# Patient Record
Sex: MALE | Race: BLACK OR AFRICAN AMERICAN | NOT HISPANIC OR LATINO | Employment: OTHER | URBAN - NONMETROPOLITAN AREA
[De-identification: names, ages, dates, MRNs, and addresses within clinical notes are randomized per-mention and may not be internally consistent; named-entity substitution may affect disease eponyms.]

---

## 2017-01-08 ENCOUNTER — TRANSCRIBE ORDERS (OUTPATIENT)
Dept: CARDIAC SURGERY | Facility: CLINIC | Age: 67
End: 2017-01-08

## 2017-01-08 DIAGNOSIS — I73.9 PVD (PERIPHERAL VASCULAR DISEASE) (HCC): Primary | ICD-10-CM

## 2017-01-12 ENCOUNTER — OFFICE VISIT (OUTPATIENT)
Dept: FAMILY MEDICINE CLINIC | Facility: CLINIC | Age: 67
End: 2017-01-12

## 2017-01-12 VITALS
BODY MASS INDEX: 19.05 KG/M2 | OXYGEN SATURATION: 96 % | HEART RATE: 75 BPM | SYSTOLIC BLOOD PRESSURE: 130 MMHG | DIASTOLIC BLOOD PRESSURE: 79 MMHG | WEIGHT: 140.6 LBS | HEIGHT: 72 IN

## 2017-01-12 DIAGNOSIS — Z13.9 SCREENING: ICD-10-CM

## 2017-01-12 DIAGNOSIS — B35.1 ONYCHOMYCOSIS: ICD-10-CM

## 2017-01-12 DIAGNOSIS — I73.9 PERIPHERAL VASCULAR DISEASE (HCC): ICD-10-CM

## 2017-01-12 DIAGNOSIS — I10 ESSENTIAL HYPERTENSION: Primary | ICD-10-CM

## 2017-01-12 LAB
ALBUMIN SERPL-MCNC: 4.1 GM/DL (ref 3.4–4.8)
ALP SERPL-CCNC: 92 U/L (ref 38–126)
ALT SERPL-CCNC: 30 U/L (ref 21–72)
ANION GAP SERPL CALCULATED.3IONS-SCNC: 10 MMOL/L (ref 5–15)
AST SERPL-CCNC: 54 U/L (ref 17–59)
BILIRUB SERPL-MCNC: 0.5 MG/DL (ref 0.2–1.3)
BUN SERPL-MCNC: 14 MG/DL (ref 7–21)
CALCIUM SERPL-MCNC: 9.1 MG/DL (ref 8.4–10.2)
CHLORIDE SERPL-SCNC: 103 MMOL/L (ref 95–110)
CO2 SERPL-SCNC: 25 MMOL/L (ref 22–31)
CREAT SERPL-MCNC: 1.3 MG/DL (ref 0.7–1.3)
GLUCOSE SERPL-MCNC: 81 MG/DL (ref 60–100)
POTASSIUM SERPL-SCNC: 4.1 MMOL/L (ref 3.5–5.1)
PROT SERPL-MCNC: 7.7 GM/DL (ref 6.3–8.6)
SODIUM SERPL-SCNC: 138 MMOL/L (ref 137–145)

## 2017-01-12 PROCEDURE — 99213 OFFICE O/P EST LOW 20 MIN: CPT | Performed by: FAMILY MEDICINE

## 2017-01-12 RX ORDER — ATORVASTATIN CALCIUM 80 MG/1
80 TABLET, FILM COATED ORAL DAILY
Qty: 30 TABLET | Refills: 3 | Status: SHIPPED | OUTPATIENT
Start: 2017-01-12 | End: 2017-06-06 | Stop reason: SDUPTHER

## 2017-01-12 RX ORDER — HYDRALAZINE HYDROCHLORIDE 25 MG/1
25 TABLET, FILM COATED ORAL 3 TIMES DAILY
Qty: 90 TABLET | Refills: 3 | Status: SHIPPED | OUTPATIENT
Start: 2017-01-12 | End: 2017-06-06 | Stop reason: SDUPTHER

## 2017-01-12 RX ORDER — CLOPIDOGREL BISULFATE 75 MG/1
75 TABLET ORAL DAILY
Qty: 30 TABLET | Refills: 3 | Status: SHIPPED | OUTPATIENT
Start: 2017-01-12 | End: 2017-06-06 | Stop reason: SDUPTHER

## 2017-01-12 RX ORDER — TERBINAFINE HYDROCHLORIDE 250 MG/1
250 TABLET ORAL DAILY
Qty: 84 TABLET | Refills: 0 | Status: SHIPPED | OUTPATIENT
Start: 2017-01-12 | End: 2017-04-12

## 2017-01-12 NOTE — MR AVS SNAPSHOT
Dwight Ryder   1/12/2017 9:15 AM   Office Visit    Dept Phone:  [phone]   Encounter #:  [id number]    Provider:  Ganga Acuña MD   Department:  [facility] FAMILY MEDICINE                Your Full Care Plan              Your Updated Medication List          This list is accurate as of: 1/12/17  9:57 AM.  Always use your most recent med list.                aspirin 81 MG tablet   Take 1 tablet by mouth Daily.       atorvastatin 80 MG tablet   Commonly known as:  LIPITOR   Take 1 tablet by mouth Daily.       clopidogrel 75 MG tablet   Commonly known as:  PLAVIX   Take 1 tablet by mouth Daily.       diltiaZEM  MG 24 hr capsule   Commonly known as:  CARDIZEM CD   take 1 capsule by mouth once daily       hydrALAZINE 25 MG tablet   Commonly known as:  APRESOLINE   Take 1 tablet by mouth 3 (Three) Times a Day.               We Performed the Following     CBC Auto Differential     Comprehensive Metabolic Panel     Hepatitis C Antibody       You Were Diagnosed With        Codes Comments    Screening    -  Primary ICD-10-CM: Z13.9  ICD-9-CM: V82.9     Essential hypertension     ICD-10-CM: I10  ICD-9-CM: 401.9       Instructions     None    Patient Instructions History      Upcoming Appointments     Visit Type Date Time Department    OFFICE VISIT 1/12/2017  9:15 AM MGW  RESIDENT [facility]    FOLLOW UP 5/22/2017  9:40 AM MGW CT VAS SURGERY [facility] ART GRAFT UNILAT 5/22/2017  8:30 AM MGW CT VAS SURGERY [facility] RANGEL 5/22/2017  9:00 AM MGW CT VAS SURGERY [facility]      MyChart Signup     Our records indicate that you have declined [facility] signup. If you would like to sign up for [facility], please email [email or web address] or call [phone] to obtain an activation code.             Other Info from Your Visit           Your Appointments     May 22, 2017  8:30 AM CDT   VASCULAR ULTRASOUND VISIT with [facility] RANGEL ROOM   [facility]  "CARDIOTHORACIC AND VASCULAR SURGERY (--)    [address] [place] [address] [address]  [facility] [address] [place] [id number] [phone]            May 22, 2017  9:00 AM CDT   VASCULAR ULTRASOUND VISIT with RUI [facility] RANGEL ROOM   [facility] CARDIOTHORACIC AND VASCULAR SURGERY (--)    [address] [place] [address] Dr  [facility] [address] [place] [id number] [phone]            May 22, 2017  9:40 AM CDT   Follow Up with Kavon Robison MD   [facility] CARDIOTHORACIC AND VASCULAR SURGERY (--)    [address] [place] [address] [address]  [facility] [address] [place] [id number] [phone]           Arrive 15 minutes prior to appointment.              Other Notes About Your Plan     Risk score 4.        Allergies     Lisinopril        Reason for Visit     Follow-up     Hypertension           Vital Signs     Blood Pressure Pulse Height Weight Oxygen Saturation Body Mass Index    130/79 (BP Location: Left arm, Patient Position: Sitting) 75 72\" (182.9 cm) 140 lb 9.6 oz (63.8 kg) 96% 19.07 kg/m2    Smoking Status                   Current Every Day Smoker           Problems and Diagnoses Noted     High blood pressure    Screening    -  Primary        "

## 2017-01-13 LAB — HCV AB SERPL QL IA: NEGATIVE

## 2017-04-12 ENCOUNTER — APPOINTMENT (OUTPATIENT)
Dept: LAB | Facility: HOSPITAL | Age: 67
End: 2017-04-12

## 2017-04-12 ENCOUNTER — OFFICE VISIT (OUTPATIENT)
Dept: FAMILY MEDICINE CLINIC | Facility: CLINIC | Age: 67
End: 2017-04-12

## 2017-04-12 VITALS
HEIGHT: 72 IN | BODY MASS INDEX: 18.83 KG/M2 | OXYGEN SATURATION: 98 % | WEIGHT: 139 LBS | DIASTOLIC BLOOD PRESSURE: 70 MMHG | HEART RATE: 79 BPM | SYSTOLIC BLOOD PRESSURE: 130 MMHG

## 2017-04-12 DIAGNOSIS — F17.218 NICOTINE DEPENDENCE, CIGARETTES, WITH OTHER NICOTINE-INDUCED DISORDERS: ICD-10-CM

## 2017-04-12 DIAGNOSIS — E78.2 MIXED HYPERLIPIDEMIA: ICD-10-CM

## 2017-04-12 DIAGNOSIS — I10 ESSENTIAL HYPERTENSION: Primary | ICD-10-CM

## 2017-04-12 DIAGNOSIS — B35.1 ONYCHOMYCOSIS: ICD-10-CM

## 2017-04-12 DIAGNOSIS — I70.211 ATHEROSCLEROSIS OF NATIVE ARTERY OF RIGHT LOWER EXTREMITY WITH INTERMITTENT CLAUDICATION (HCC): ICD-10-CM

## 2017-04-12 DIAGNOSIS — Z51.81 MEDICATION MONITORING ENCOUNTER: ICD-10-CM

## 2017-04-12 PROCEDURE — 99213 OFFICE O/P EST LOW 20 MIN: CPT | Performed by: FAMILY MEDICINE

## 2017-04-12 PROCEDURE — 99406 BEHAV CHNG SMOKING 3-10 MIN: CPT | Performed by: FAMILY MEDICINE

## 2017-04-12 PROCEDURE — 36415 COLL VENOUS BLD VENIPUNCTURE: CPT | Performed by: FAMILY MEDICINE

## 2017-04-12 NOTE — PROGRESS NOTES
Subjective:     CC:  Toenail fungus (follow-up)    Dwight Ryder is a 66 y.o. male who presents for 3 month follow-up of onychomycosis and HTN.  He completed the course of Lamisil, thinks his toenails look about the same  His blood pressure is well controlled, compliant with medications.    New concerns: None.    Evaluation:   Blood pressure reading not indicated for medication reasons: No   Blood pressure reading refused by patient: No   Home blood pressure readings:  Normotensive, systolic 130s, diastolic 80s.  He checks about once a week presently.   Blood pressure often elevated in physician office: Sometimes   Onset of symptoms: N/A .     Symptoms:   Headache: no   Visual disturbance: yes, chronic, has seen Dr Ray in past for senile cataracts   Fatigue: no   Dyspnea: no   Orthopnea: no   Edema: no   Chest pain: no   Palpitations: no   Diaphoresis: no    Risk Factors:   Tobacco use, HLD, FHx: CAD, FHx: HTN    Comorbid Conditions:   PVD    The following portions of the patient's history were reviewed and updated as appropriate: allergies, current medications, past family history, past medical history, past social history, past surgical history and problem list.    Preventative:  Over the past 2 weeks, have you felt down, depressed, or hopeless?No   Over the past 2 weeks, have you felt little interest or pleasure in doing things?No  Clinical depression screening refused by patient.No     On osteoporosis therapy?No     Past Medical Hx:  Past Medical History:   Diagnosis Date   • Artificial lens present     right   • Astigmatism     myopic   • Atherosclerosis of native arteries of extremity with intermittent claudication    • Benign essential hypertension    • Benign prostatic hyperplasia    • Cataract     R>L   • Corneal foreign body     Left eye, 2 years ago      • Essential hypertension    • Examination     individual health   • Exanthematous disorder    • Fracture of foot    • Hypercholesterolemia     • Hyperkalemia    • Hyperlipidemia    • Hypertensive disorder    • Male erectile disorder    • Need for vaccination    • Peripheral vascular disease     Post RIGHT fem-pop bypass graft 1/7/13 Post thrombolysis to graft 12/15/14      • Peripheral vascular disease     unspecified   • Posterior subcapsular polar senile cataract    • Surgical follow-up care     R fem->pop bypass 1/7/13      • Tobacco dependence syndrome    • Tobacco user    • Urticaria     will give kenalog and benadyl , will order allery test      • Visual discomfort        Past Surgical Hx:  Past Surgical History:   Procedure Laterality Date   • ANGIOPLASTY      femoral-popliteal artery (dilation) (Abdominal aortogram, bilateral lower extremity runoff. Repair of left common femoral artery.)   11/26/2012    • FEMORAL POPLITEAL BYPASS      Right with 6 mm [place]-baron graft)   01/07/2013    • INJECTION OF MEDICATION  03/04/2013    kenalog(3)    • OTHER SURGICAL HISTORY      Remove cataract, insert lens (Cataract extraction with intraocular lens implantation, right eye.)   11/26/2013        Health Maintenance:  Health Maintenance   Topic Date Due   • LIPID PANEL  09/13/2017   • COLONOSCOPY  09/14/2026   • TDAP/TD VACCINES (2 - Td) 09/14/2026   • PNEUMOCOCCAL VACCINE (19-64 MEDIUM RISK)  Completed   • HEPATITIS C SCREENING  Completed   • INFLUENZA VACCINE  Addressed   • ZOSTER VACCINE  Addressed       Current Meds:    Current Outpatient Prescriptions:   •  aspirin 81 MG tablet, Take 1 tablet by mouth Daily., Disp: 30 tablet, Rfl: 3  •  atorvastatin (LIPITOR) 80 MG tablet, Take 1 tablet by mouth Daily., Disp: 30 tablet, Rfl: 3  •  clopidogrel (PLAVIX) 75 MG tablet, Take 1 tablet by mouth Daily., Disp: 30 tablet, Rfl: 3  •  diltiazem CD (CARDIZEM CD) 240 MG 24 hr capsule, take 1 capsule by mouth once daily, Disp: 30 capsule, Rfl: 11  •  hydrALAZINE (APRESOLINE) 25 MG tablet, Take 1 tablet by mouth 3 (Three) Times a Day., Disp: 90 tablet, Rfl:  "3    Allergies:  Lisinopril    Family Hx:  Family History   Problem Relation Age of Onset   • Cancer Other    • Heart disease Other    • Hypertension Other    • Stroke Other         Social History:  Social History     Social History   • Marital status:      Spouse name: N/A   • Number of children: N/A   • Years of education: N/A     Occupational History   • Not on file.     Social History Main Topics   • Smoking status: Current Every Day Smoker     Packs/day: 1.00     Types: Cigarettes   • Smokeless tobacco: Not on file      Comment: 1-1.5 PPD FOR 45 YEARS   • Alcohol use Yes      Comment: 36 to 48 oz a day   • Drug use: No   • Sexual activity: Defer     Other Topics Concern   • Not on file     Social History Narrative   • No narrative on file       Review of Systems  Review of Systems   Constitutional: Negative for activity change and appetite change.   HENT: Negative for congestion and dental problem.    Eyes: Negative for discharge and itching.   Respiratory: Negative for apnea.    Cardiovascular: Negative for chest pain and leg swelling.   Gastrointestinal: Negative for abdominal distention and abdominal pain.   Endocrine: Negative for cold intolerance.   Genitourinary: Negative for difficulty urinating and enuresis.   Musculoskeletal: Negative for arthralgias and back pain.   Allergic/Immunologic: Negative for environmental allergies and food allergies.   Neurological: Negative for dizziness and facial asymmetry.   Hematological: Negative for adenopathy.   Psychiatric/Behavioral: Negative for agitation and behavioral problems.       Objective:     /70  Pulse 79  Ht 72\" (182.9 cm)  Wt 139 lb (63 kg)  SpO2 98%  BMI 18.85 kg/m2    General:  alert, appears stated age and cooperative   Oropharynx: lips, mucosa, and tongue normal; teeth and gums normal    Eyes:  conjunctivae/corneas clear. PERRL, EOM's intact. Fundi benign.    Ears:  normal TM's and external ear canals both ears   Neck: no " adenopathy, no carotid bruit, no JVD, supple, symmetrical, trachea midline and thyroid not enlarged, symmetric, no tenderness/mass/nodules   Thyroid:  no palpable nodule   Lung: clear to auscultation bilaterally   Heart:  regular rate and rhythm, S1, S2 normal, no murmur, click, rub or gallop   Abdomen: soft, non-tender; bowel sounds normal; no masses,  no organomegaly   Extremities: extremities normal, atraumatic, no cyanosis or edema   Skin:  Onchymycosis both big toes, improved      Pulses: 2+ and symmetric   Neuro: normal without focal findings, mental status, speech normal, alert and oriented x3 and reflexes normal and symmetric       Lab Review   Assessment:     Hypertension well controlled and needs to quit smoking.  1. Essential hypertension    2. Mixed hyperlipidemia    3. Atherosclerosis of native artery of right lower extremity with intermittent claudication    4. Nicotine dependence, cigarettes, with other nicotine-induced disorders    5. Onychomycosis    6. Medication monitoring encounter         Plan:   HTN  1.  Rx changes: none. He states compliant.  2.  Education:    EKG ordered: no    Findings: N/A   Presented an overview of HTN, expected course, considerations, risk factors, and exacerbation prevention.   Discussed treatment options for HTN: yes   Recommended restricted dietary Na intake: yes   Recommended increased in dietary K intake: yes   Discussed patient action plan for HTN: yes  3.  Compliance at present is estimated to be fair. Efforts to improve compliance (if necessary) will be directed at dietary modifications: DASH diet (counseled) and increased exercise.  Recommended more frequent bp monitoring, such as every day or every other day at least.  4.  Follow up: 3 months    PVD, status post R fem-pop 2013--Denies claudication, peripheral pulses normal  -last saw Dr Robison 11/22/16, who recommended f/u in 6 months, RANGEL at that time  -Extensive smoking cessation counseling (3-10 min).  Pt  aware of risks of continued smoking, including MI, CVA, worsened PVD, cancer.    Onychomycosis  -Completed Lamisil  -Recheck CMP today for LFT's    GOALS:  Control bp  BARRIERS TO GOALS:  Insight    Preventative:  Male Preventative: Colon cancer screening is not up to date.    delayed   Recommended:Td, Varicella and Influenza, refused. Also refused colonoscopy/FIT.  .  Smoking cessation counseling was provided.   Pre-comtemplative, declined medications, patches, gum.  Previously declined low-dose CT.  does not drink  decrease soda or juice intake, increase water intake, increase physical activity, reduce portion size, cut out extra servings, reduce fast food intake and have 3 meals a day    RISK SCORE: 4           This document has been electronically signed by Ganga Acuña MD on April 12, 2017 7:46 PM      EMR Dragon/Transcription disclaimer:   Some of this note may be an electronic transcription/translation of spoken language to printed text. The electronic translation of spoken language may permit erroneous, or at times, nonsensical words or phrases to be inadvertently transcribed; Although I have reviewed the note for such errors, some may still exist.

## 2017-04-14 NOTE — PROGRESS NOTES
I have reviewed the notes, assessments, and/or procedures performed by Ganga Acuña MD , I concur with her/his documentation of Dwight Ryder.         This document has been electronically signed by Ana Hilliard MD on April 14, 2017 2:49 PM

## 2017-05-22 ENCOUNTER — OFFICE VISIT (OUTPATIENT)
Dept: CARDIAC SURGERY | Facility: CLINIC | Age: 67
End: 2017-05-22

## 2017-05-22 VITALS
OXYGEN SATURATION: 98 % | DIASTOLIC BLOOD PRESSURE: 72 MMHG | SYSTOLIC BLOOD PRESSURE: 140 MMHG | TEMPERATURE: 95.4 F | HEIGHT: 72 IN | BODY MASS INDEX: 18.56 KG/M2 | HEART RATE: 74 BPM | WEIGHT: 137 LBS

## 2017-05-22 DIAGNOSIS — F17.218 NICOTINE DEPENDENCE, CIGARETTES, WITH OTHER NICOTINE-INDUCED DISORDERS: ICD-10-CM

## 2017-05-22 DIAGNOSIS — N18.2 CHRONIC KIDNEY DISEASE, STAGE 2 (MILD): ICD-10-CM

## 2017-05-22 DIAGNOSIS — I10 ESSENTIAL HYPERTENSION: ICD-10-CM

## 2017-05-22 DIAGNOSIS — E78.2 MIXED HYPERLIPIDEMIA: ICD-10-CM

## 2017-05-22 DIAGNOSIS — I70.211 ATHEROSCLEROSIS OF NATIVE ARTERY OF RIGHT LOWER EXTREMITY WITH INTERMITTENT CLAUDICATION (HCC): Primary | ICD-10-CM

## 2017-05-22 PROBLEM — N18.9 CHRONIC KIDNEY DISEASE: Status: ACTIVE | Noted: 2017-05-22

## 2017-05-22 PROCEDURE — 99406 BEHAV CHNG SMOKING 3-10 MIN: CPT | Performed by: THORACIC SURGERY (CARDIOTHORACIC VASCULAR SURGERY)

## 2017-05-22 PROCEDURE — 99214 OFFICE O/P EST MOD 30 MIN: CPT | Performed by: THORACIC SURGERY (CARDIOTHORACIC VASCULAR SURGERY)

## 2017-06-06 DIAGNOSIS — I73.9 PERIPHERAL VASCULAR DISEASE (HCC): ICD-10-CM

## 2017-06-06 DIAGNOSIS — I10 ESSENTIAL HYPERTENSION: ICD-10-CM

## 2017-06-06 RX ORDER — CLOPIDOGREL BISULFATE 75 MG/1
TABLET ORAL
Qty: 30 TABLET | Refills: 3 | Status: SHIPPED | OUTPATIENT
Start: 2017-06-06 | End: 2017-09-27 | Stop reason: SDUPTHER

## 2017-06-06 RX ORDER — HYDRALAZINE HYDROCHLORIDE 25 MG/1
TABLET, FILM COATED ORAL
Qty: 90 TABLET | Refills: 3 | Status: SHIPPED | OUTPATIENT
Start: 2017-06-06 | End: 2017-09-27 | Stop reason: SDUPTHER

## 2017-06-06 RX ORDER — ATORVASTATIN CALCIUM 80 MG/1
TABLET, FILM COATED ORAL
Qty: 30 TABLET | Refills: 3 | Status: SHIPPED | OUTPATIENT
Start: 2017-06-06 | End: 2017-09-27 | Stop reason: SDUPTHER

## 2017-07-11 ENCOUNTER — APPOINTMENT (OUTPATIENT)
Dept: LAB | Facility: HOSPITAL | Age: 67
End: 2017-07-11

## 2017-07-11 ENCOUNTER — OFFICE VISIT (OUTPATIENT)
Dept: FAMILY MEDICINE CLINIC | Facility: CLINIC | Age: 67
End: 2017-07-11

## 2017-07-11 VITALS
HEIGHT: 72 IN | OXYGEN SATURATION: 98 % | WEIGHT: 135.06 LBS | SYSTOLIC BLOOD PRESSURE: 132 MMHG | HEART RATE: 74 BPM | BODY MASS INDEX: 18.29 KG/M2 | DIASTOLIC BLOOD PRESSURE: 66 MMHG

## 2017-07-11 DIAGNOSIS — Z51.81 MEDICATION MONITORING ENCOUNTER: ICD-10-CM

## 2017-07-11 DIAGNOSIS — I73.9 PERIPHERAL VASCULAR DISEASE (HCC): ICD-10-CM

## 2017-07-11 DIAGNOSIS — F17.218 NICOTINE DEPENDENCE, CIGARETTES, WITH OTHER NICOTINE-INDUCED DISORDERS: ICD-10-CM

## 2017-07-11 DIAGNOSIS — I10 ESSENTIAL HYPERTENSION: Primary | ICD-10-CM

## 2017-07-11 PROBLEM — N18.9 CHRONIC KIDNEY DISEASE: Status: RESOLVED | Noted: 2017-05-22 | Resolved: 2017-07-11

## 2017-07-11 PROBLEM — B35.1 ONYCHOMYCOSIS: Status: RESOLVED | Noted: 2017-04-12 | Resolved: 2017-07-11

## 2017-07-11 LAB
ALBUMIN SERPL-MCNC: 4.4 G/DL (ref 3.4–4.8)
ALBUMIN/GLOB SERPL: 1.1 G/DL (ref 1.1–1.8)
ALP SERPL-CCNC: 93 U/L (ref 38–126)
ALT SERPL W P-5'-P-CCNC: 34 U/L (ref 21–72)
ANION GAP SERPL CALCULATED.3IONS-SCNC: 11 MMOL/L (ref 5–15)
AST SERPL-CCNC: 56 U/L (ref 17–59)
BILIRUB SERPL-MCNC: 0.5 MG/DL (ref 0.2–1.3)
BUN BLD-MCNC: 13 MG/DL (ref 7–21)
BUN/CREAT SERPL: 10.1 (ref 7–25)
CALCIUM SPEC-SCNC: 9.1 MG/DL (ref 8.4–10.2)
CHLORIDE SERPL-SCNC: 104 MMOL/L (ref 95–110)
CO2 SERPL-SCNC: 24 MMOL/L (ref 22–31)
CREAT BLD-MCNC: 1.29 MG/DL (ref 0.7–1.3)
GFR SERPL CREATININE-BSD FRML MDRD: 68 ML/MIN/1.73 (ref 49–113)
GLOBULIN UR ELPH-MCNC: 4 GM/DL (ref 2.3–3.5)
GLUCOSE BLD-MCNC: 86 MG/DL (ref 60–100)
POTASSIUM BLD-SCNC: 4 MMOL/L (ref 3.5–5.1)
PROT SERPL-MCNC: 8.4 G/DL (ref 6.3–8.6)
SODIUM BLD-SCNC: 139 MMOL/L (ref 137–145)

## 2017-07-11 PROCEDURE — 99213 OFFICE O/P EST LOW 20 MIN: CPT | Performed by: FAMILY MEDICINE

## 2017-07-11 PROCEDURE — 80053 COMPREHEN METABOLIC PANEL: CPT | Performed by: FAMILY MEDICINE

## 2017-07-11 PROCEDURE — 36415 COLL VENOUS BLD VENIPUNCTURE: CPT | Performed by: FAMILY MEDICINE

## 2017-07-11 RX ORDER — DILTIAZEM HYDROCHLORIDE 240 MG/1
240 CAPSULE, COATED, EXTENDED RELEASE ORAL DAILY
Qty: 30 CAPSULE | Refills: 11 | Status: SHIPPED | OUTPATIENT
Start: 2017-07-11 | End: 2018-04-24 | Stop reason: DRUGHIGH

## 2017-07-11 NOTE — PROGRESS NOTES
Subjective:     Chief Complaint   Patient presents with   • Hypertension     Dwight Ryder is a 66 y.o. male who presents for 3 month follow-up of HTN.  He completed the course of Lamisil for onychomycosis, we discussed repeating LFTs, but it appears he didn't go to the lab though patient insists he did.    He is also following with Dr Robison for peripheral vascular disease.    New concerns: None, just needs refill of Cardizem.    Evaluation:   Blood pressure reading not indicated for medication reasons: No   Blood pressure reading refused by patient: No   Home blood pressure readings:  Normotensive, systolic 130s, diastolic 80s.  He checks about once a week presently.   Blood pressure often elevated in physician office: Sometimes   Onset of symptoms: N/A .     Symptoms:   Headache: no   Visual disturbance: yes, chronic, has seen Dr Ray in past for senile cataracts   Fatigue: no   Dyspnea: no   Orthopnea: no   Edema: no   Chest pain: no   Palpitations: no   Diaphoresis: no    Risk Factors:   Tobacco use, HLD, FHx: CAD, FHx: HTN    Comorbid Conditions:   PVD    The following portions of the patient's history were reviewed and updated as appropriate: allergies, current medications, past family history, past medical history, past social history, past surgical history and problem list.    Preventative:  Over the past 2 weeks, have you felt down, depressed, or hopeless?No   Over the past 2 weeks, have you felt little interest or pleasure in doing things?No  Clinical depression screening refused by patient.No     On osteoporosis therapy?No     Past Medical Hx:  Past Medical History:   Diagnosis Date   • Artificial lens present     right   • Astigmatism     myopic   • Atherosclerosis of native arteries of extremity with intermittent claudication    • Benign essential hypertension    • Benign prostatic hyperplasia    • Cataract     R>L   • Corneal foreign body     Left eye, 2 years ago      • Essential  hypertension    • Examination     individual health   • Exanthematous disorder    • Fracture of foot    • Hypercholesterolemia    • Hyperkalemia    • Hyperlipidemia    • Hypertensive disorder    • Male erectile disorder    • Need for vaccination    • Peripheral vascular disease     Post RIGHT fem-pop bypass graft 1/7/13 Post thrombolysis to graft 12/15/14      • Peripheral vascular disease     unspecified   • Posterior subcapsular polar senile cataract    • Surgical follow-up care     R fem->pop bypass 1/7/13      • Tobacco dependence syndrome    • Tobacco user    • Urticaria     will give kenalog and benadyl , will order allery test      • Visual discomfort        Past Surgical Hx:  Past Surgical History:   Procedure Laterality Date   • ANGIOPLASTY      femoral-popliteal artery (dilation) (Abdominal aortogram, bilateral lower extremity runoff. Repair of left common femoral artery.)   11/26/2012    • FEMORAL POPLITEAL BYPASS      Right with 6 mm [place]-baron graft)   01/07/2013    • INJECTION OF MEDICATION  03/04/2013    kenalog(3)    • OTHER SURGICAL HISTORY      Remove cataract, insert lens (Cataract extraction with intraocular lens implantation, right eye.)   11/26/2013        Health Maintenance:  Health Maintenance   Topic Date Due   • INFLUENZA VACCINE  08/01/2017   • LIPID PANEL  09/13/2017   • COLONOSCOPY  09/14/2026   • TDAP/TD VACCINES (2 - Td) 09/14/2026   • PNEUMOCOCCAL VACCINE (19-64 MEDIUM RISK)  Completed   • HEPATITIS C SCREENING  Completed   • ZOSTER VACCINE  Addressed       Current Meds:    Current Outpatient Prescriptions:   •  aspirin 81 MG tablet, Take 1 tablet by mouth Daily., Disp: 30 tablet, Rfl: 3  •  atorvastatin (LIPITOR) 80 MG tablet, take 1 tablet by mouth once daily, Disp: 30 tablet, Rfl: 3  •  clopidogrel (PLAVIX) 75 MG tablet, take 1 tablet by mouth once daily, Disp: 30 tablet, Rfl: 3  •  diltiaZEM CD (CARDIZEM CD) 240 MG 24 hr capsule, Take 1 capsule by mouth Daily., Disp: 30 capsule,  "Rfl: 11  •  hydrALAZINE (APRESOLINE) 25 MG tablet, take 1 tablet by mouth three times a day, Disp: 90 tablet, Rfl: 3    Allergies:  Lisinopril    Family Hx:  Family History   Problem Relation Age of Onset   • Cancer Other    • Heart disease Other    • Hypertension Other    • Stroke Other         Social History:  Social History     Social History   • Marital status:      Spouse name: N/A   • Number of children: N/A   • Years of education: N/A     Occupational History   • Not on file.     Social History Main Topics   • Smoking status: Current Every Day Smoker     Packs/day: 1.00     Types: Cigarettes   • Smokeless tobacco: Not on file      Comment: 1-1.5 PPD FOR 45 YEARS   • Alcohol use Yes      Comment: 36 to 48 oz a day   • Drug use: No   • Sexual activity: Defer     Other Topics Concern   • Not on file     Social History Narrative       Review of Systems  Review of Systems   Constitutional: Negative for activity change and appetite change.   HENT: Negative for congestion and dental problem.    Eyes: Negative for discharge and itching.   Respiratory: Negative for apnea.    Cardiovascular: Negative for chest pain and leg swelling.   Gastrointestinal: Negative for abdominal distention and abdominal pain.   Endocrine: Negative for cold intolerance.   Genitourinary: Negative for difficulty urinating and enuresis.   Musculoskeletal: Negative for arthralgias and back pain.   Allergic/Immunologic: Negative for environmental allergies and food allergies.   Neurological: Negative for dizziness and facial asymmetry.   Hematological: Negative for adenopathy.   Psychiatric/Behavioral: Negative for agitation and behavioral problems.       Objective:     /66 (BP Location: Left arm, Patient Position: Sitting, Cuff Size: Adult)  Pulse 74  Ht 72\" (182.9 cm)  Wt 135 lb 1 oz (61.3 kg)  SpO2 98%  BMI 18.32 kg/m2    General:  alert, appears stated age and cooperative   Oropharynx: lips, mucosa, and tongue normal; " teeth and gums normal    Eyes:  conjunctivae/corneas clear. PERRL, EOM's intact. Fundi benign.    Ears:  normal TM's and external ear canals both ears   Neck: no adenopathy, no carotid bruit, no JVD, supple, symmetrical, trachea midline and thyroid not enlarged, symmetric, no tenderness/mass/nodules   Thyroid:  no palpable nodule   Lung: clear to auscultation bilaterally   Heart:  regular rate and rhythm, S1, S2 normal, no murmur, click, rub or gallop   Abdomen: soft, non-tender; bowel sounds normal; no masses,  no organomegaly   Extremities: extremities normal, atraumatic, no cyanosis or edema   Skin:  No lesions    Pulses: 2+ and symmetric   Neuro: normal without focal findings, mental status, speech normal, alert and oriented x3 and reflexes normal and symmetric       Lab Review   Assessment:     Hypertension well controlled and needs to quit smoking.  1. Essential hypertension    2. Medication monitoring encounter    3. Nicotine dependence, cigarettes, with other nicotine-induced disorders    4. Peripheral vascular disease         Plan:   HTN  1.  Rx changes: none. He states compliant.  2.  Education:    EKG ordered: no    Findings: N/A   Presented an overview of HTN, expected course, considerations, risk factors, and exacerbation prevention.   Discussed treatment options for HTN: yes   Recommended restricted dietary Na intake: yes   Recommended increased in dietary K intake: yes   Discussed patient action plan for HTN: yes  3.  Compliance at present is estimated to be fair. Efforts to improve compliance (if necessary) will be directed at dietary modifications: DASH diet (counseled) and increased exercise.  Recommended more frequent bp monitoring, such as every day or every other day at least.  4.  Follow up: 3 months    PVD, status post R fem-pop 2013--Denies claudication, peripheral pulses normal  -last saw Dr Robison 5/22/17, who recommended f/u in 6 months, RANGEL at that time  -Extensive smoking cessation  counseling (3-10 min).  Pt aware of risks of continued smoking, including MI, CVA, worsened PVD, cancer.    History of onychomycosis  -Completed Lamisil  -Recheck CMP today for LFT's    GOALS:  Control bp  BARRIERS TO GOALS:  Insight    Preventative:  Male Preventative: Colon cancer screening is not up to date.    delayed   Recommended:Td, Varicella and Influenza, refused. Also refused colonoscopy/FIT.  .  Smoking cessation counseling was provided.   Pre-comtemplative, declined medications, patches, gum.  Previously declined low-dose CT.  does not drink  decrease soda or juice intake, increase water intake, increase physical activity, reduce portion size, cut out extra servings, reduce fast food intake and have 3 meals a day    RISK SCORE: 4           This document has been electronically signed by Ganga Acuña MD on July 11, 2017 5:12 PM      EMR Dragon/Transcription disclaimer:   Some of this note may be an electronic transcription/translation of spoken language to printed text. The electronic translation of spoken language may permit erroneous, or at times, nonsensical words or phrases to be inadvertently transcribed; Although I have reviewed the note for such errors, some may still exist.

## 2017-07-13 NOTE — PROGRESS NOTES
I have reviewed the notes, assessments, and/or procedures performed by Ganga Acuña MD , I concur with her/his documentation of Dwight Ryder.         This document has been electronically signed by Ana Hilliard MD on July 13, 2017 8:35 AM

## 2017-09-27 DIAGNOSIS — I10 ESSENTIAL HYPERTENSION: ICD-10-CM

## 2017-09-27 DIAGNOSIS — I73.9 PERIPHERAL VASCULAR DISEASE (HCC): ICD-10-CM

## 2017-09-28 RX ORDER — CLOPIDOGREL BISULFATE 75 MG/1
TABLET ORAL
Qty: 30 TABLET | Refills: 6 | Status: SHIPPED | OUTPATIENT
Start: 2017-09-28 | End: 2018-04-25 | Stop reason: SDUPTHER

## 2017-09-28 RX ORDER — ATORVASTATIN CALCIUM 80 MG/1
TABLET, FILM COATED ORAL
Qty: 30 TABLET | Refills: 6 | Status: SHIPPED | OUTPATIENT
Start: 2017-09-28 | End: 2018-04-25 | Stop reason: SDUPTHER

## 2017-09-28 RX ORDER — HYDRALAZINE HYDROCHLORIDE 25 MG/1
TABLET, FILM COATED ORAL
Qty: 90 TABLET | Refills: 6 | Status: SHIPPED | OUTPATIENT
Start: 2017-09-28 | End: 2018-04-24 | Stop reason: SDUPTHER

## 2017-10-13 ENCOUNTER — OFFICE VISIT (OUTPATIENT)
Dept: FAMILY MEDICINE CLINIC | Facility: CLINIC | Age: 67
End: 2017-10-13

## 2017-10-13 VITALS
OXYGEN SATURATION: 97 % | HEIGHT: 72 IN | HEART RATE: 79 BPM | SYSTOLIC BLOOD PRESSURE: 130 MMHG | WEIGHT: 138 LBS | BODY MASS INDEX: 18.69 KG/M2 | DIASTOLIC BLOOD PRESSURE: 88 MMHG

## 2017-10-13 DIAGNOSIS — I10 ESSENTIAL HYPERTENSION: Primary | ICD-10-CM

## 2017-10-13 DIAGNOSIS — F17.200 TOBACCO DEPENDENCE: ICD-10-CM

## 2017-10-13 DIAGNOSIS — I73.9 PERIPHERAL VASCULAR DISEASE (HCC): ICD-10-CM

## 2017-10-13 PROCEDURE — 99213 OFFICE O/P EST LOW 20 MIN: CPT | Performed by: FAMILY MEDICINE

## 2017-10-13 PROCEDURE — 99406 BEHAV CHNG SMOKING 3-10 MIN: CPT | Performed by: FAMILY MEDICINE

## 2017-10-13 NOTE — PROGRESS NOTES
Subjective:     Chief Complaint   Patient presents with   • Hypertension   • Med Refill   • Follow-up     Dwight Ryder is a 66 y.o. male who presents for 3 month follow-up of HTN.     He is also following with Dr Robison for peripheral vascular disease.    New concerns:  none    Evaluation:   Blood pressure reading not indicated for medication reasons: No   Blood pressure reading refused by patient: No   Home blood pressure readings:  Normotensive, systolic 130s, diastolic 80s.  He checks about once a week presently.   Blood pressure often elevated in physician office: Sometimes   Onset of symptoms: N/A .     Symptoms:   Headache: no   Visual disturbance: yes, chronic, has seen Dr Ray in past for senile cataracts   Fatigue: no   Dyspnea: no   Orthopnea: no   Edema: no   Chest pain: no   Palpitations: no   Diaphoresis: no    Risk Factors:   Tobacco use, HLD, FHx: CAD, FHx: HTN    Comorbid Conditions:   PVD    The following portions of the patient's history were reviewed and updated as appropriate: allergies, current medications, past family history, past medical history, past social history, past surgical history and problem list.    Preventative:  Over the past 2 weeks, have you felt down, depressed, or hopeless?No   Over the past 2 weeks, have you felt little interest or pleasure in doing things?No  Clinical depression screening refused by patient.No     On osteoporosis therapy?No     Past Medical Hx:  Past Medical History:   Diagnosis Date   • Artificial lens present     right   • Astigmatism     myopic   • Atherosclerosis of native arteries of extremity with intermittent claudication    • Benign essential hypertension    • Benign prostatic hyperplasia    • Cataract     R>L   • Corneal foreign body     Left eye, 2 years ago      • Essential hypertension    • Examination     individual health   • Exanthematous disorder    • Fracture of foot    • Hypercholesterolemia    • Hyperkalemia    •  Hyperlipidemia    • Hypertensive disorder    • Male erectile disorder    • Need for vaccination    • Peripheral vascular disease     Post RIGHT fem-pop bypass graft 1/7/13 Post thrombolysis to graft 12/15/14      • Peripheral vascular disease     unspecified   • Posterior subcapsular polar senile cataract    • Surgical follow-up care     R fem->pop bypass 1/7/13      • Tobacco dependence syndrome    • Tobacco user    • Urticaria     will give kenalog and benadyl , will order allery test      • Visual discomfort        Past Surgical Hx:  Past Surgical History:   Procedure Laterality Date   • ANGIOPLASTY      femoral-popliteal artery (dilation) (Abdominal aortogram, bilateral lower extremity runoff. Repair of left common femoral artery.)   11/26/2012    • FEMORAL POPLITEAL BYPASS      Right with 6 mm [place]-baron graft)   01/07/2013    • INJECTION OF MEDICATION  03/04/2013    kenalog(3)    • OTHER SURGICAL HISTORY      Remove cataract, insert lens (Cataract extraction with intraocular lens implantation, right eye.)   11/26/2013        Health Maintenance:  Health Maintenance   Topic Date Due   • LIPID PANEL  09/13/2017   • COLONOSCOPY  09/14/2026   • TDAP/TD VACCINES (2 - Td) 09/14/2026   • PNEUMOCOCCAL VACCINE (19-64 MEDIUM RISK)  Completed   • HEPATITIS C SCREENING  Completed   • INFLUENZA VACCINE  Addressed   • ZOSTER VACCINE  Addressed       Current Meds:    Current Outpatient Prescriptions:   •  aspirin 81 MG tablet, Take 1 tablet by mouth Daily., Disp: 30 tablet, Rfl: 3  •  atorvastatin (LIPITOR) 80 MG tablet, take 1 tablet by mouth once daily, Disp: 30 tablet, Rfl: 6  •  clopidogrel (PLAVIX) 75 MG tablet, take 1 tablet by mouth once daily, Disp: 30 tablet, Rfl: 6  •  diltiaZEM CD (CARDIZEM CD) 240 MG 24 hr capsule, Take 1 capsule by mouth Daily., Disp: 30 capsule, Rfl: 11  •  hydrALAZINE (APRESOLINE) 25 MG tablet, take 1 tablet by mouth three times a day, Disp: 90 tablet, Rfl: 6  •  nicotine polacrilex (NICORETTE) 4  "MG gum, Chew 1 each As Needed for Smoking Cessation., Disp: 40 each, Rfl: 12    Allergies:  Lisinopril    Family Hx:  Family History   Problem Relation Age of Onset   • Cancer Other    • Heart disease Other    • Hypertension Other    • Stroke Other         Social History:  Social History     Social History   • Marital status:      Spouse name: N/A   • Number of children: N/A   • Years of education: N/A     Occupational History   • Not on file.     Social History Main Topics   • Smoking status: Current Every Day Smoker     Packs/day: 1.00     Types: Cigarettes   • Smokeless tobacco: Not on file      Comment: 1-1.5 PPD FOR 45 YEARS   • Alcohol use Yes      Comment: 36 to 48 oz a day   • Drug use: No   • Sexual activity: Defer     Other Topics Concern   • Not on file     Social History Narrative       Review of Systems  Review of Systems   Constitutional: Negative for activity change and appetite change.   HENT: Negative for congestion and dental problem.    Eyes: Negative for discharge and itching.   Respiratory: Negative for apnea.    Cardiovascular: Negative for chest pain and leg swelling.   Gastrointestinal: Negative for abdominal distention and abdominal pain.   Endocrine: Negative for cold intolerance.   Genitourinary: Negative for difficulty urinating and enuresis.   Musculoskeletal: Negative for arthralgias and back pain.   Allergic/Immunologic: Negative for environmental allergies and food allergies.   Neurological: Negative for dizziness and facial asymmetry.   Hematological: Negative for adenopathy.   Psychiatric/Behavioral: Negative for agitation and behavioral problems.       Objective:     /88  Pulse 79  Ht 72\" (182.9 cm)  Wt 138 lb (62.6 kg)  SpO2 97%  BMI 18.72 kg/m2    General:  alert, appears stated age and cooperative   Oropharynx: lips, mucosa, and tongue normal; teeth and gums normal    Eyes:  conjunctivae/corneas clear. PERRL, EOM's intact. Fundi benign.    Ears:  normal TM's " and external ear canals both ears   Neck: no adenopathy, no carotid bruit, no JVD, supple, symmetrical, trachea midline and thyroid not enlarged, symmetric, no tenderness/mass/nodules   Thyroid:  no palpable nodule   Lung: clear to auscultation bilaterally   Heart:  regular rate and rhythm, S1, S2 normal, no murmur, click, rub or gallop   Abdomen: soft, non-tender; bowel sounds normal; no masses,  no organomegaly   Extremities: extremities normal, atraumatic, no cyanosis or edema   Skin: No lesions    Pulses: 2+ and symmetric   Neuro: normal without focal findings, mental status, speech normal, alert and oriented x3 and reflexes normal and symmetric       Lab Review  Component      Latest Ref Rng & Units 7/11/2017   Glucose      60 - 100 mg/dL 86   BUN      7 - 21 mg/dL 13   Creatinine      0.70 - 1.30 mg/dL 1.29   Sodium      137 - 145 mmol/L 139   Potassium      3.5 - 5.1 mmol/L 4.0   Chloride      95 - 110 mmol/L 104   CO2      22.0 - 31.0 mmol/L 24.0   Calcium      8.4 - 10.2 mg/dL 9.1   Total Protein      6.3 - 8.6 g/dL 8.4   Albumin      3.40 - 4.80 g/dL 4.40   ALT (SGPT)      21 - 72 U/L 34   AST (SGOT)      17 - 59 U/L 56   Alkaline Phosphatase      38 - 126 U/L 93   Total Bilirubin      0.2 - 1.3 mg/dL 0.5        Assessment:     Hypertension well controlled and needs to quit smoking.  1. Essential hypertension    2. Tobacco dependence    3. Peripheral vascular disease       Plan:   HTN  1.  Rx changes: none. He states compliant.  2.  Education:    EKG ordered: no    Findings: N/A   Presented an overview of HTN, expected course, considerations, risk factors, and exacerbation prevention.   Discussed treatment options for HTN: yes   Recommended restricted dietary Na intake: yes   Recommended increased in dietary K intake: yes   Discussed patient action plan for HTN: yes  3.  Compliance at present is estimated to be fair. Efforts to improve compliance (if necessary) will be directed at dietary modifications:  DASH diet (counseled) and increased exercise.  Recommended more frequent bp monitoring, such as every day or every other day at least.  4.  Follow up: 3 months.  Re-check BMP, lipids prior to being seen    PVD, status post R fem-pop 2013--Denies claudication, peripheral pulses normal  -last saw Dr Robison 5/22/17, who recommended f/u in 6 months, RANGEL at that time  -Extensive smoking cessation counseling (3-10 min).  Pt aware of risks of continued smoking, including MI, CVA, worsened PVD, cancer.    History of onychomycosis  -Completed Lamisil  -Recheck CMP today for LFT's    GOALS:  Control bp  BARRIERS TO GOALS:  Insight    Preventative:  Male Preventative: Colon cancer screening is not up to date.    delayed   Recommended:Td, Varicella and Influenza, refused. Also refused colonoscopy/FIT.  .  Smoking cessation counseling was provided.  Tobacco dependence:  Counseled, 3-10 minutes spent, asymptomatic. Agreeable to try gum.  Previously declined low-dose CT.  does not drink  decrease soda or juice intake, increase water intake, increase physical activity, reduce portion size, cut out extra servings, reduce fast food intake and have 3 meals a day    RISK SCORE: 4           This document has been electronically signed by Ganga Acuña MD on October 13, 2017 11:16 AM

## 2017-10-13 NOTE — PROGRESS NOTES
I have reviewed the notes, assessments, and/or procedures performed. I concur with her/his documentation of Dwight Ryder.     Freddy Vega, DO

## 2017-12-06 ENCOUNTER — OFFICE VISIT (OUTPATIENT)
Dept: CARDIAC SURGERY | Facility: CLINIC | Age: 67
End: 2017-12-06

## 2017-12-06 VITALS
HEART RATE: 71 BPM | SYSTOLIC BLOOD PRESSURE: 122 MMHG | TEMPERATURE: 98.2 F | BODY MASS INDEX: 18.76 KG/M2 | WEIGHT: 138.5 LBS | HEIGHT: 72 IN | DIASTOLIC BLOOD PRESSURE: 65 MMHG | OXYGEN SATURATION: 99 %

## 2017-12-06 DIAGNOSIS — F17.218 NICOTINE DEPENDENCE, CIGARETTES, WITH OTHER NICOTINE-INDUCED DISORDERS: ICD-10-CM

## 2017-12-06 DIAGNOSIS — I73.9 PERIPHERAL VASCULAR DISEASE (HCC): ICD-10-CM

## 2017-12-06 DIAGNOSIS — I70.211 ATHEROSCLEROSIS OF NATIVE ARTERY OF RIGHT LOWER EXTREMITY WITH INTERMITTENT CLAUDICATION (HCC): Primary | ICD-10-CM

## 2017-12-06 PROCEDURE — 99213 OFFICE O/P EST LOW 20 MIN: CPT | Performed by: NURSE PRACTITIONER

## 2017-12-06 NOTE — PROGRESS NOTES
Subjective   Patient ID: Dwight Ryder is a 67 y.o. male is here today for follow-up PVD.  Chief Complaint   Patient presents with   • Peripheral Vascular Disease     6 month follow up    Denies any claudication, ambulation limited by back pain, No open sores  No distal color changes   History of Present Illness  PCP:  Ganga Acuña MD    67 y.o. male with HTN(stable), dyslipidemia(stable), BPH, PVD(stable), CKD2(stable).  smokes 1 PPD.  No claudication, rest pain, tissue loss.  . Continues to smoke with no desire to attempt cessation.  No other associated symptoms or modifying factors.    2012 RIGHT SFA stent  2013 RIGHT femoral to popliteal artery bypass (PTFE)  12/15/2014 RIGHT lower extremity angiogram:  RIGHT fem-pop thrombolysis, EKOS.  12/16/2014 RIGHT lower extremity angiogram:  Patient was taken to the angio suite and placed in supine position. Right lower extremity arteriogram was performed through the 6-[place] sheath demonstrating a widely patent external iliac artery, common femoral artery, and profunda femoris artery. SFA is occluded. Fem/pop graft is widely patent with no significant flow-limiting stenosis. Mild irregularity at the distal anastomosis. No flow-limiting stenosis. Popliteal artery appears normal. Anterior tibial artery is patent to the foot. Posterior tibial artery is patent to the foot. Peroneal artery is not well seen. Plantar arch is not intact. Good flow to the digital arteries. No intervention  indicated. Good pulse in the foot at the completion of the case.  11/22/2016 RANGEL:  RIGHT 1.1 triphasic.  LEFT 1.1 triphasic.  Graft 169cm/s, triphasic.  5/22/2017 RANGEL:  RIGHT 1.1 triphasic.  LEFT 1.0 triphasic.  Graft 186cm/s, triphasic.  12/06/17   RANGEL:  RIGHT 1.12  Triphasic.  LEFT 0.99  Triphasic.  Graft Patent 237 cm/s, triphasic.    The following portions of the patient's history were reviewed and updated as appropriate: allergies, current medications, past family history, past  medical history, past social history, past surgical history and problem list.  See HPI   Allergies   Allergen Reactions   • Lisinopril        Current Outpatient Prescriptions:   •  aspirin 81 MG tablet, Take 1 tablet by mouth Daily., Disp: 30 tablet, Rfl: 3  •  atorvastatin (LIPITOR) 80 MG tablet, take 1 tablet by mouth once daily, Disp: 30 tablet, Rfl: 6  •  clopidogrel (PLAVIX) 75 MG tablet, take 1 tablet by mouth once daily, Disp: 30 tablet, Rfl: 6  •  diltiaZEM CD (CARDIZEM CD) 240 MG 24 hr capsule, Take 1 capsule by mouth Daily., Disp: 30 capsule, Rfl: 11  •  hydrALAZINE (APRESOLINE) 25 MG tablet, take 1 tablet by mouth three times a day, Disp: 90 tablet, Rfl: 6    Review of Systems   Constitution: Negative for chills, decreased appetite, weakness and malaise/fatigue.   HENT: Negative for hearing loss, hoarse voice, nosebleeds and stridor.    Eyes: Negative for visual disturbance.   Cardiovascular: Negative for chest pain, claudication, cyanosis, dyspnea on exertion, irregular heartbeat and leg swelling.        LE:  N Open sores  N color changes  N coldness.           N numbness or paresthesias   Respiratory: Positive for cough. Negative for hemoptysis and shortness of breath.    Hematologic/Lymphatic: Does not bruise/bleed easily.   Skin: Positive for dry skin. Negative for color change, flushing, itching, poor wound healing and rash.   Musculoskeletal: Positive for back pain. Negative for falls, muscle cramps and muscle weakness.        Spinal stenosis    Gastrointestinal: Negative for abdominal pain, anorexia, hematemesis and melena.   Genitourinary: Negative for hematuria.   Neurological: Negative for brief paralysis, dizziness, focal weakness, numbness, paresthesias and sensory change.   Psychiatric/Behavioral: Negative for altered mental status.   Allergic/Immunologic: Negative for hives.        Objective   Physical Exam   Constitutional: He is oriented to person, place, and time. He appears  well-nourished.   HENT:   Head: Normocephalic.   Mouth/Throat: Oropharynx is clear and moist.   Eyes: Conjunctivae are normal. Pupils are equal, round, and reactive to light.   Neck: Neck supple. No JVD present.   Cardiovascular: Normal rate, regular rhythm, normal heart sounds and intact distal pulses.    Pulses:       Carotid pulses are 1+ on the right side, and 1+ on the left side.       Radial pulses are 2+ on the right side, and 2+ on the left side.        Dorsalis pedis pulses are 2+ on the right side, and 2+ on the left side.        Posterior tibial pulses are 2+ on the right side, and 2+ on the left side.   LE:  W/d/p cap refil<3 sec no edema  Light pink post toes  No paleness  Intact mobility and sensation      Pulmonary/Chest: Effort normal and breath sounds normal. No stridor.   Abdominal: Soft. Bowel sounds are normal.   Musculoskeletal: He exhibits no edema or tenderness.       Neurological Sensory Findings -  Unaltered sharp/dull left ankle/foot discrimination.    Vascular Status -  His exam exhibits right foot vasculature normal. His exam exhibits no right foot edema. His exam exhibits left foot vasculature normal. His exam exhibits no left foot edema.   Skin Integrity  -  His right foot skin is intact.     Dwight 's left foot skin is intact. .  Neurological: He is alert and oriented to person, place, and time.   Skin: Skin is warm and dry. No rash noted. No erythema. No pallor.   RLE with spider varcosities    Psychiatric: His behavior is normal.   Nursing note and vitals reviewed.      Vitals:    12/06/17 1128   BP: 122/65   Pulse: 71   Temp: 98.2 °F (36.8 °C)   SpO2: 99%         Assessment/Plan    Detailed discussion regarding risks, benefits, and treatment plan.  Patient understands, agrees, and wishes to proceed with plan.     Independent Review of Radiographic Studies:    12/06/17   RANGEL:  RIGHT 1.12  Triphasic.  LEFT 0.99  Triphasic.  Graft Patent 237 cm/s, triphasic.    1. Atherosclerosis of  native artery of right lower extremity with intermittent claudication  Good distal perfusion  Recommend continue medical management with antiplatelet therapy, and statin therapy.    Vitamin D has been shown to promote healthy lining of arteries.  Check level and replace as needed.   Recommend healthy life style:   Consider smoking reduction or cessation.  Walking total of 45 minutes per day in minimum of 15 minute intervals   Avoid  going  barefoot.  Non perfumed cream for for dry skin  Repeat in 6 months unless   If signs and symptoms of ischemia should occur including but not limited to pale/blue discoloration of limb, increasing pain with ambulation or at rest, or a non-healing wound. Patient is to notify [facility] for immediate evaluation.  - Vitamin D 25 Hydroxy; Future    2. Peripheral vascular disease  As Above   - Doppler Ankle Brachial Index Single Level CAR; Future  - Vitamin D 25 Hydroxy; Future    3. Nicotine dependence, cigarettes, with other nicotine-induced disorders  Consider smoking cessation or reduction.  Pt is not interested  Declined NRP  Will advise if changes mind.  Aware of negative health  aspects of smoking.

## 2017-12-06 NOTE — PATIENT INSTRUCTIONS
Vascular Tests:  Graft open and flowing well  Normal blood flow to both feet  Recommend continue medical management with antiplatelet therapy, and statin therapy.    Vitamin D has been shown to promote healthy lining of arteries.  Check level and replace as needed.   Recommend healthy life style:   Consider smoking reduction or cessation. Walking total of 45 minutes per day in minimum of 15 minute intervals   Avoid  going  barefoot.  Non perfumed cream for for dry skin  Repeat in 6 months unless   If signs and symptoms of ischemia should occur including but not limited to pale/blue discoloration of limb, increasing pain with ambulation or at rest, or a non-healing wound. Patient is to notify [facility] center for immediate evaluation.

## 2018-01-09 ENCOUNTER — LAB (OUTPATIENT)
Dept: LAB | Facility: HOSPITAL | Age: 68
End: 2018-01-09

## 2018-01-09 DIAGNOSIS — I10 ESSENTIAL HYPERTENSION: ICD-10-CM

## 2018-01-09 LAB
ANION GAP SERPL CALCULATED.3IONS-SCNC: 12 MMOL/L (ref 5–15)
ARTICHOKE IGE QN: 47 MG/DL (ref 1–129)
BUN BLD-MCNC: 12 MG/DL (ref 7–21)
BUN/CREAT SERPL: 10.9 (ref 7–25)
CALCIUM SPEC-SCNC: 9.2 MG/DL (ref 8.4–10.2)
CHLORIDE SERPL-SCNC: 104 MMOL/L (ref 95–110)
CHOLEST SERPL-MCNC: 137 MG/DL (ref 0–199)
CO2 SERPL-SCNC: 23 MMOL/L (ref 22–31)
CREAT BLD-MCNC: 1.1 MG/DL (ref 0.7–1.3)
GFR SERPL CREATININE-BSD FRML MDRD: 81 ML/MIN/1.73 (ref 49–113)
GLUCOSE BLD-MCNC: 78 MG/DL (ref 60–100)
HDLC SERPL-MCNC: 62 MG/DL (ref 60–200)
LDLC/HDLC SERPL: 1.02 {RATIO} (ref 0–3.55)
POTASSIUM BLD-SCNC: 4 MMOL/L (ref 3.5–5.1)
SODIUM BLD-SCNC: 139 MMOL/L (ref 137–145)
TRIGL SERPL-MCNC: 60 MG/DL (ref 20–199)

## 2018-01-09 PROCEDURE — 80061 LIPID PANEL: CPT

## 2018-01-09 PROCEDURE — 36415 COLL VENOUS BLD VENIPUNCTURE: CPT

## 2018-01-09 PROCEDURE — 80048 BASIC METABOLIC PNL TOTAL CA: CPT

## 2018-01-11 ENCOUNTER — OFFICE VISIT (OUTPATIENT)
Dept: FAMILY MEDICINE CLINIC | Facility: CLINIC | Age: 68
End: 2018-01-11

## 2018-01-11 VITALS
WEIGHT: 141 LBS | BODY MASS INDEX: 19.1 KG/M2 | HEIGHT: 72 IN | DIASTOLIC BLOOD PRESSURE: 78 MMHG | SYSTOLIC BLOOD PRESSURE: 140 MMHG | HEART RATE: 80 BPM | OXYGEN SATURATION: 96 %

## 2018-01-11 DIAGNOSIS — I10 ESSENTIAL HYPERTENSION: Primary | ICD-10-CM

## 2018-01-11 DIAGNOSIS — Z13.6 SCREENING FOR AAA (ABDOMINAL AORTIC ANEURYSM): ICD-10-CM

## 2018-01-11 DIAGNOSIS — I73.9 PERIPHERAL VASCULAR DISEASE (HCC): ICD-10-CM

## 2018-01-11 DIAGNOSIS — F17.218 NICOTINE DEPENDENCE, CIGARETTES, WITH OTHER NICOTINE-INDUCED DISORDERS: ICD-10-CM

## 2018-01-11 PROCEDURE — 99406 BEHAV CHNG SMOKING 3-10 MIN: CPT | Performed by: FAMILY MEDICINE

## 2018-01-11 PROCEDURE — 99213 OFFICE O/P EST LOW 20 MIN: CPT | Performed by: FAMILY MEDICINE

## 2018-01-12 NOTE — PROGRESS NOTES
I have reviewed the notes, assessments, and/or procedures performed by Ganga Acuña MD , I concur with her/his documentation of Dwight Ryder.         This document has been electronically signed by Ana Hilliard MD on January 12, 2018 4:26 PM

## 2018-01-12 NOTE — PROGRESS NOTES
Subjective:     Chief Complaint   Patient presents with   • Hypertension   • Follow-up     Dwight Ryder is a 67 y.o. male who presents for 3 month follow-up of HTN.     He is also following with Dr Robison for peripheral vascular disease.    New concerns:  none    Evaluation:   Blood pressure reading not indicated for medication reasons: No   Blood pressure reading refused by patient: No   Home blood pressure readings:  Normotensive, systolic 130s, diastolic 80s.  He checks about once a week presently.   Blood pressure often elevated in physician office: Sometimes   Onset of symptoms: N/A .     Symptoms:   Headache: no   Visual disturbance: yes, chronic, has seen Dr Ray in past for senile cataracts   Fatigue: no   Dyspnea: no   Orthopnea: no   Edema: no   Chest pain: no   Palpitations: no   Diaphoresis: no    Risk Factors:   Tobacco use, HLD, FHx: CAD, FHx: HTN    Comorbid Conditions:   PVD    The following portions of the patient's history were reviewed and updated as appropriate: allergies, current medications, past family history, past medical history, past social history, past surgical history and problem list.    Preventative:  Over the past 2 weeks, have you felt down, depressed, or hopeless?No   Over the past 2 weeks, have you felt little interest or pleasure in doing things?No  Clinical depression screening refused by patient.No     On osteoporosis therapy?No     Past Medical Hx:  Past Medical History:   Diagnosis Date   • Artificial lens present     right   • Astigmatism     myopic   • Atherosclerosis of native arteries of extremity with intermittent claudication    • Benign essential hypertension    • Benign prostatic hyperplasia    • Cataract     R>L   • Corneal foreign body     Left eye, 2 years ago      • Essential hypertension    • Examination     individual health   • Exanthematous disorder    • Fracture of foot    • Hypercholesterolemia    • Hyperkalemia    • Hyperlipidemia    •  Hypertensive disorder    • Male erectile disorder    • Need for vaccination    • Peripheral vascular disease     Post RIGHT fem-pop bypass graft 1/7/13 Post thrombolysis to graft 12/15/14      • Peripheral vascular disease     unspecified   • Posterior subcapsular polar senile cataract    • Surgical follow-up care     R fem->pop bypass 1/7/13      • Tobacco dependence syndrome    • Tobacco user    • Urticaria     will give kenalog and benadyl , will order allery test      • Visual discomfort        Past Surgical Hx:  Past Surgical History:   Procedure Laterality Date   • ANGIOPLASTY      femoral-popliteal artery (dilation) (Abdominal aortogram, bilateral lower extremity runoff. Repair of left common femoral artery.)   11/26/2012    • FEMORAL POPLITEAL BYPASS      Right with 6 mm [place]-baron graft)   01/07/2013    • INJECTION OF MEDICATION  03/04/2013    kenalog(3)    • OTHER SURGICAL HISTORY      Remove cataract, insert lens (Cataract extraction with intraocular lens implantation, right eye.)   11/26/2013        Health Maintenance:  Health Maintenance   Topic Date Due   • AAA SCREEN (ONE-TIME)  01/11/2018   • PNEUMOCOCCAL VACCINES (65+ LOW/MEDIUM RISK) (1 of 2 - PCV13) 04/01/2018 (Originally 10/23/2017)   • LIPID PANEL  01/09/2019   • COLONOSCOPY  09/14/2026   • TDAP/TD VACCINES (2 - Td) 09/14/2026   • HEPATITIS C SCREENING  Completed   • INFLUENZA VACCINE  Addressed   • ZOSTER VACCINE  Addressed       Current Meds:    Current Outpatient Prescriptions:   •  aspirin 81 MG tablet, Take 1 tablet by mouth Daily., Disp: 30 tablet, Rfl: 3  •  atorvastatin (LIPITOR) 80 MG tablet, take 1 tablet by mouth once daily, Disp: 30 tablet, Rfl: 6  •  clopidogrel (PLAVIX) 75 MG tablet, take 1 tablet by mouth once daily, Disp: 30 tablet, Rfl: 6  •  diltiaZEM CD (CARDIZEM CD) 240 MG 24 hr capsule, Take 1 capsule by mouth Daily., Disp: 30 capsule, Rfl: 11  •  hydrALAZINE (APRESOLINE) 25 MG tablet, take 1 tablet by mouth three times a day,  "Disp: 90 tablet, Rfl: 6  •  nicotine polacrilex (NICORETTE) 4 MG gum, Chew 1 each As Needed for Smoking Cessation., Disp: 100 each, Rfl: 0    Allergies:  Lisinopril    Family Hx:  Family History   Problem Relation Age of Onset   • Cancer Other    • Heart disease Other    • Hypertension Other    • Stroke Other         Social History:  Social History     Social History   • Marital status:      Spouse name: N/A   • Number of children: N/A   • Years of education: N/A     Occupational History   • Not on file.     Social History Main Topics   • Smoking status: Current Every Day Smoker     Packs/day: 1.00     Types: Cigarettes   • Smokeless tobacco: Not on file      Comment: 1-1.5 PPD FOR 45 YEARS   • Alcohol use Yes      Comment: 36 to 48 oz a day   • Drug use: No   • Sexual activity: Defer     Other Topics Concern   • Not on file     Social History Narrative       Review of Systems  Review of Systems   Constitutional: Negative for activity change and appetite change.   HENT: Negative for congestion and dental problem.    Eyes: Negative for discharge and itching.   Respiratory: Negative for apnea.    Cardiovascular: Negative for chest pain and leg swelling.   Gastrointestinal: Negative for abdominal distention and abdominal pain.   Endocrine: Negative for cold intolerance.   Genitourinary: Negative for difficulty urinating and enuresis.   Musculoskeletal: Negative for arthralgias and back pain.   Allergic/Immunologic: Negative for environmental allergies and food allergies.   Neurological: Negative for dizziness and facial asymmetry.   Hematological: Negative for adenopathy.   Psychiatric/Behavioral: Negative for agitation and behavioral problems.       Objective:     /78  Pulse 80  Ht 182.9 cm (72\")  Wt 64 kg (141 lb)  SpO2 96%  BMI 19.12 kg/m2    General:  alert, appears stated age and cooperative   Oropharynx: lips, mucosa, and tongue normal; teeth and gums normal    Eyes:  conjunctivae/corneas " clear. PERRL, EOM's intact. Fundi benign.    Ears:  normal TM's and external ear canals both ears   Neck: no adenopathy, no carotid bruit, no JVD, supple, symmetrical, trachea midline and thyroid not enlarged, symmetric, no tenderness/mass/nodules   Thyroid:  no palpable nodule   Lung: clear to auscultation bilaterally   Heart:  regular rate and rhythm, S1, S2 normal, no murmur, click, rub or gallop   Abdomen: soft, non-tender; bowel sounds normal; no masses,  no organomegaly   Extremities: extremities normal, atraumatic, no cyanosis or edema   Skin: No lesions    Pulses: 2+ and symmetric   Neuro: normal without focal findings, mental status, speech normal, alert and oriented x3 and reflexes normal and symmetric       Lab Review  Lab on 01/09/2018   Component Date Value Ref Range Status   • Glucose 01/09/2018 78  60 - 100 mg/dL Final   • BUN 01/09/2018 12  7 - 21 mg/dL Final   • Creatinine 01/09/2018 1.10  0.70 - 1.30 mg/dL Final   • Sodium 01/09/2018 139  137 - 145 mmol/L Final   • Potassium 01/09/2018 4.0  3.5 - 5.1 mmol/L Final   • Chloride 01/09/2018 104  95 - 110 mmol/L Final   • CO2 01/09/2018 23.0  22.0 - 31.0 mmol/L Final   • Calcium 01/09/2018 9.2  8.4 - 10.2 mg/dL Final   • eGFR   Amer 01/09/2018 81  49 - 113 mL/min/1.73 Final   • BUN/Creatinine Ratio 01/09/2018 10.9  7.0 - 25.0 Final   • Anion Gap 01/09/2018 12.0  5.0 - 15.0 mmol/L Final   • Total Cholesterol 01/09/2018 137  0 - 199 mg/dL Final   • Triglycerides 01/09/2018 60  20 - 199 mg/dL Final   • HDL Cholesterol 01/09/2018 62  60 - 200 mg/dL Final   • LDL Cholesterol  01/09/2018 47  1 - 129 mg/dL Final   • LDL/HDL Ratio 01/09/2018 1.02  0.00 - 3.55 Final        Assessment/Plan:     Dwight was seen today for hypertension and follow-up.    Diagnoses and all orders for this visit:    Essential hypertension    Nicotine dependence, cigarettes, with other nicotine-induced disorders  -     nicotine polacrilex (NICORETTE) 4 MG gum; Chew 1 each As  Needed for Smoking Cessation.    Screening for AAA (abdominal aortic aneurysm)  -     Abdominal Aortic Aneurysm Screening Medicare CAR; Future    Peripheral vascular disease      Plan:   HTN  1.  Rx changes: none. He states compliant.  Labs reviewed  2.  Education:    EKG ordered: no    Findings: N/A   Presented an overview of HTN, expected course, considerations, risk factors, and exacerbation prevention.   Discussed treatment options for HTN: yes   Recommended restricted dietary Na intake: yes   Recommended increased in dietary K intake: yes   Discussed patient action plan for HTN: yes  3.  Compliance at present is estimated to be fair. Efforts to improve compliance (if necessary) will be directed at dietary modifications: DASH diet (counseled) and increased exercise.  Recommended more frequent bp monitoring, such as every day or every other day at least.  4.  Follow up: 3 months.     PVD, status post R fem-pop 2013--Denies claudication, peripheral pulses normal  -last saw Dr Robison 12/6/17, who recommended f/u in 6 months, RANGEL at that time  -Extensive smoking cessation counseling (3-10 min).  Pt aware of risks of continued smoking, including MI, CVA, worsened PVD, cancer.  States Nicorette gum was too expensive when I last sent Rx, but wants to see how much it is this time.  [id number] number given to patient.    Goals        Patient Stated    • cut back on smoking to 1/2 ppd (pt-stated)            Barrier:  Dependence          Preventative:  Male Preventative: Colon cancer screening is not up to date.    delayed   Recommended:Td, Varicella and Influenza, refused. Also refused colonoscopy/FIT.  .  Smoking cessation counseling was provided.  Tobacco dependence:  Counseled, 3-10 minutes spent, asymptomatic. Agreeable to try gum.  Previously declined low-dose CT.  does not drink  decrease soda or juice intake, increase water intake, increase physical activity, reduce portion size, cut out extra servings, reduce  fast food intake and have 3 meals a day    RISK SCORE: 4           This document has been electronically signed by Ganga Acuña MD on January 11, 2018 9:02 PM

## 2018-04-11 ENCOUNTER — OFFICE VISIT (OUTPATIENT)
Dept: FAMILY MEDICINE CLINIC | Facility: CLINIC | Age: 68
End: 2018-04-11

## 2018-04-11 VITALS
HEIGHT: 72 IN | SYSTOLIC BLOOD PRESSURE: 160 MMHG | DIASTOLIC BLOOD PRESSURE: 80 MMHG | HEART RATE: 90 BPM | WEIGHT: 139.13 LBS | BODY MASS INDEX: 18.84 KG/M2 | OXYGEN SATURATION: 93 %

## 2018-04-11 DIAGNOSIS — I73.9 PERIPHERAL VASCULAR DISEASE (HCC): ICD-10-CM

## 2018-04-11 DIAGNOSIS — F17.218 NICOTINE DEPENDENCE, CIGARETTES, WITH OTHER NICOTINE-INDUCED DISORDERS: ICD-10-CM

## 2018-04-11 DIAGNOSIS — I10 ESSENTIAL HYPERTENSION: Primary | ICD-10-CM

## 2018-04-11 DIAGNOSIS — N52.9 VASCULOGENIC ERECTILE DYSFUNCTION, UNSPECIFIED VASCULOGENIC ERECTILE DYSFUNCTION TYPE: ICD-10-CM

## 2018-04-11 PROCEDURE — 99406 BEHAV CHNG SMOKING 3-10 MIN: CPT | Performed by: FAMILY MEDICINE

## 2018-04-11 PROCEDURE — 99213 OFFICE O/P EST LOW 20 MIN: CPT | Performed by: FAMILY MEDICINE

## 2018-04-11 NOTE — PROGRESS NOTES
Subjective:     Chief Complaint   Patient presents with   • Hypertension     Dwight Ryder is a 67 y.o. male who presents for 3 month follow-up of HTN.     He is also following with Dr Robison for peripheral vascular disease.    New concerns:  none    Evaluation:   Blood pressure reading not indicated for medication reasons: No   Blood pressure reading refused by patient: No   Home blood pressure readings:  Normotensive, systolic 130s, diastolic 80s.  He checks about once a week presently.   Blood pressure often elevated in physician office: Sometimes   Onset of symptoms: N/A .     Symptoms:   Headache: no   Visual disturbance: yes, chronic, has seen Dr Ray in past for senile cataracts   Fatigue: no   Dyspnea: no   Orthopnea: no   Edema: no   Chest pain: no   Palpitations: no   Diaphoresis: no    Risk Factors:   Tobacco use, HLD, FHx: CAD, FHx: HTN    Comorbid Conditions:   PVD    The following portions of the patient's history were reviewed and updated as appropriate: allergies, current medications, past family history, past medical history, past social history, past surgical history and problem list.    Preventative:  Over the past 2 weeks, have you felt down, depressed, or hopeless?No   Over the past 2 weeks, have you felt little interest or pleasure in doing things?No  Clinical depression screening refused by patient.No     On osteoporosis therapy?No     Past Medical Hx:  Past Medical History:   Diagnosis Date   • Artificial lens present     right   • Astigmatism     myopic   • Atherosclerosis of native arteries of extremity with intermittent claudication    • Benign essential hypertension    • Benign prostatic hyperplasia    • Cataract     R>L   • Corneal foreign body     Left eye, 2 years ago      • Essential hypertension    • Examination     individual health   • Exanthematous disorder    • Fracture of foot    • Hypercholesterolemia    • Hyperkalemia    • Hyperlipidemia    • Hypertensive  disorder    • Male erectile disorder    • Need for vaccination    • Peripheral vascular disease     Post RIGHT fem-pop bypass graft 1/7/13 Post thrombolysis to graft 12/15/14      • Peripheral vascular disease     unspecified   • Posterior subcapsular polar senile cataract    • Surgical follow-up care     R fem->pop bypass 1/7/13      • Tobacco dependence syndrome    • Tobacco user    • Urticaria     will give kenalog and benadyl , will order allery test      • Visual discomfort        Past Surgical Hx:  Past Surgical History:   Procedure Laterality Date   • ANGIOPLASTY      femoral-popliteal artery (dilation) (Abdominal aortogram, bilateral lower extremity runoff. Repair of left common femoral artery.)   11/26/2012    • FEMORAL POPLITEAL BYPASS      Right with 6 mm [place]-baron graft)   01/07/2013    • INJECTION OF MEDICATION  03/04/2013    kenalog(3)    • OTHER SURGICAL HISTORY      Remove cataract, insert lens (Cataract extraction with intraocular lens implantation, right eye.)   11/26/2013        Health Maintenance:  Health Maintenance   Topic Date Due   • PNEUMOCOCCAL VACCINES (65+ LOW/MEDIUM RISK) (1 of 2 - PCV13) 04/12/2018 (Originally 10/23/2017)   • LIPID PANEL  01/09/2019   • COLONOSCOPY  09/14/2026   • TDAP/TD VACCINES (2 - Td) 09/14/2026   • HEPATITIS C SCREENING  Completed   • INFLUENZA VACCINE  Addressed   • AAA SCREEN (ONE-TIME)  Completed   • ZOSTER VACCINE  Addressed       Current Meds:    Current Outpatient Prescriptions:   •  aspirin 81 MG tablet, Take 1 tablet by mouth Daily., Disp: 30 tablet, Rfl: 3  •  atorvastatin (LIPITOR) 80 MG tablet, take 1 tablet by mouth once daily, Disp: 30 tablet, Rfl: 6  •  clopidogrel (PLAVIX) 75 MG tablet, take 1 tablet by mouth once daily, Disp: 30 tablet, Rfl: 6  •  diltiaZEM CD (CARDIZEM CD) 240 MG 24 hr capsule, Take 1 capsule by mouth Daily., Disp: 30 capsule, Rfl: 11  •  hydrALAZINE (APRESOLINE) 25 MG tablet, take 1 tablet by mouth three times a day, Disp: 90  "tablet, Rfl: 6    Allergies:  Lisinopril    Family Hx:  Family History   Problem Relation Age of Onset   • Cancer Other    • Heart disease Other    • Hypertension Other    • Stroke Other         Social History:  Social History     Social History   • Marital status:      Spouse name: N/A   • Number of children: N/A   • Years of education: N/A     Occupational History   • Not on file.     Social History Main Topics   • Smoking status: Current Every Day Smoker     Packs/day: 1.00     Types: Cigarettes   • Smokeless tobacco: Not on file      Comment: 1-1.5 PPD FOR 45 YEARS   • Alcohol use Yes      Comment: 36 to 48 oz a day   • Drug use: No   • Sexual activity: Defer     Other Topics Concern   • Not on file     Social History Narrative   • No narrative on file       Review of Systems  Review of Systems   Constitutional: Negative for activity change and appetite change.   HENT: Negative for congestion and dental problem.    Eyes: Negative for discharge and itching.   Respiratory: Negative for apnea.    Cardiovascular: Negative for chest pain and leg swelling.   Gastrointestinal: Negative for abdominal distention and abdominal pain.   Endocrine: Negative for cold intolerance.   Genitourinary: Positive for testicular pain (erectile dysfunction). Negative for difficulty urinating and enuresis.   Musculoskeletal: Negative for arthralgias and back pain.   Allergic/Immunologic: Negative for environmental allergies and food allergies.   Neurological: Negative for dizziness and facial asymmetry.   Hematological: Negative for adenopathy.   Psychiatric/Behavioral: Negative for agitation and behavioral problems.       Objective:     /80 (BP Location: Left arm, Patient Position: Sitting, Cuff Size: Adult)   Pulse 90   Ht 182.9 cm (72\")   Wt 63.1 kg (139 lb 2 oz)   SpO2 93%   BMI 18.87 kg/m²     General:  alert, appears stated age and cooperative   Oropharynx: lips, mucosa, and tongue normal; teeth and gums normal "    Eyes:  conjunctivae/corneas clear. PERRL, EOM's intact. Fundi benign.    Ears:  normal TM's and external ear canals both ears   Neck: no adenopathy, no carotid bruit, no JVD, supple, symmetrical, trachea midline and thyroid not enlarged, symmetric, no tenderness/mass/nodules   Thyroid:  no palpable nodule   Lung: clear to auscultation bilaterally   Heart:  regular rate and rhythm, S1, S2 normal, no murmur, click, rub or gallop   Abdomen: soft, non-tender; bowel sounds normal; no masses,  no organomegaly   Extremities: extremities normal, atraumatic, no cyanosis or edema   Skin: No lesions    Pulses: 2+ and symmetric   Neuro: normal without focal findings, mental status, speech normal, alert and oriented x3 and reflexes normal and symmetric       Lab Review  No visits with results within 1 Week(s) from this visit.   Latest known visit with results is:   Hospital Outpatient Visit on 01/24/2018   Component Date Value Ref Range Status   • Prox Ao Diam 01/25/2018 2.27  cm Final   • Mid Ao Diam 01/25/2018 2.12  cm Final   • Dist Ao Diam 01/25/2018 1.67  cm Final        Assessment/Plan:     Dwight was seen today for hypertension.    Diagnoses and all orders for this visit:    Essential hypertension    Nicotine dependence, cigarettes, with other nicotine-induced disorders   -Tobacco dependence:  Counseled, 3-10 minutes spent, asymptomatic.    Peripheral vascular disease    Vasculogenic erectile dysfunction, unspecified vasculogenic erectile dysfunction type   -Patient is requesting Viagra.  Explained that his symptoms are likely secondary to inadequate blood flow, given his lengthy history of peripheral vascular disease and continued tobacco consumption.  Urged to quit smoking.      Plan:   HTN  1.  Rx changes: none at this time.  States normotensive at home.  2.  Education:    EKG ordered: no    Findings: N/A   Presented an overview of HTN, expected course, considerations, risk factors, and exacerbation  prevention.   Discussed treatment options for HTN: yes   Recommended restricted dietary Na intake: yes   Recommended increased in dietary K intake: yes   Discussed patient action plan for HTN: yes  3.  Compliance at present is estimated to be fair. Efforts to improve compliance (if necessary) will be directed at dietary modifications: DASH diet (counseled) and increased exercise.  Recommended more frequent bp monitoring, such as every day or every other day at least.  4.  Follow up: 2 weeks.  Review daily bp log at that time.    Goals        Patient Stated    • cut back on smoking to 1/2 ppd (pt-stated)            Barrier:  Dependence          Preventative:  Male Preventative: Colon cancer screening is not up to date.    delayed   Recommended:Td, Varicella and Influenza, refused. Also refused colonoscopy/FIT.  .  Smoking cessation counseling was provided.    Previously declined low-dose CT.  does not drink  decrease soda or juice intake, increase water intake, increase physical activity, reduce portion size, cut out extra servings, reduce fast food intake and have 3 meals a day    RISK SCORE: 4           This document has been electronically signed by Ganga Acuña MD on April 11, 2018 9:36 PM

## 2018-04-24 ENCOUNTER — OFFICE VISIT (OUTPATIENT)
Dept: FAMILY MEDICINE CLINIC | Facility: CLINIC | Age: 68
End: 2018-04-24

## 2018-04-24 VITALS
DIASTOLIC BLOOD PRESSURE: 86 MMHG | WEIGHT: 140.25 LBS | HEART RATE: 88 BPM | OXYGEN SATURATION: 99 % | BODY MASS INDEX: 19 KG/M2 | SYSTOLIC BLOOD PRESSURE: 152 MMHG | HEIGHT: 72 IN

## 2018-04-24 DIAGNOSIS — F17.218 NICOTINE DEPENDENCE, CIGARETTES, WITH OTHER NICOTINE-INDUCED DISORDERS: ICD-10-CM

## 2018-04-24 DIAGNOSIS — I10 ESSENTIAL HYPERTENSION: Primary | ICD-10-CM

## 2018-04-24 DIAGNOSIS — I73.9 PERIPHERAL VASCULAR DISEASE (HCC): ICD-10-CM

## 2018-04-24 PROCEDURE — 99213 OFFICE O/P EST LOW 20 MIN: CPT | Performed by: FAMILY MEDICINE

## 2018-04-24 RX ORDER — DILTIAZEM HYDROCHLORIDE 300 MG/1
300 CAPSULE, COATED, EXTENDED RELEASE ORAL DAILY
Qty: 30 CAPSULE | Refills: 3 | Status: SHIPPED | OUTPATIENT
Start: 2018-04-24 | End: 2018-10-01 | Stop reason: SDUPTHER

## 2018-04-24 RX ORDER — HYDRALAZINE HYDROCHLORIDE 25 MG/1
25 TABLET, FILM COATED ORAL 3 TIMES DAILY
Qty: 90 TABLET | Refills: 6 | Status: SHIPPED | OUTPATIENT
Start: 2018-04-24 | End: 2018-05-08 | Stop reason: DRUGHIGH

## 2018-04-24 NOTE — PROGRESS NOTES
I discussed the case with the resident and I agree with their assessment and plan as outlined .  Chacorta Sims MD.

## 2018-04-25 DIAGNOSIS — I73.9 PERIPHERAL VASCULAR DISEASE (HCC): ICD-10-CM

## 2018-04-25 RX ORDER — ATORVASTATIN CALCIUM 80 MG/1
TABLET, FILM COATED ORAL
Qty: 30 TABLET | Refills: 6 | Status: SHIPPED | OUTPATIENT
Start: 2018-04-25 | End: 2018-10-01 | Stop reason: SDUPTHER

## 2018-04-25 RX ORDER — CLOPIDOGREL BISULFATE 75 MG/1
TABLET ORAL
Qty: 30 TABLET | Refills: 6 | Status: SHIPPED | OUTPATIENT
Start: 2018-04-25 | End: 2018-10-01 | Stop reason: SDUPTHER

## 2018-04-28 NOTE — PROGRESS NOTES
Subjective:     Chief Complaint   Patient presents with   • Hypertension     Dwight Ryder is a 67 y.o. male who presents for 2 week follow-up of HTN.     He is also following with Dr Robison for peripheral vascular disease.    New concerns:  none    Evaluation:   Blood pressure reading not indicated for medication reasons: No   Blood pressure reading refused by patient: No   Home blood pressure readings:  Normotensive, systolic 124-169, diastolic 71-78.  He checks about once a day presently.   Blood pressure often elevated in physician office: Sometimes   Onset of symptoms: N/A .     Symptoms:   Headache: no   Visual disturbance: yes, chronic, has seen Dr Ray in past for senile cataracts   Fatigue: no   Dyspnea: no   Orthopnea: no   Edema: no   Chest pain: no   Palpitations: no   Diaphoresis: no    Risk Factors:   Tobacco use, HLD, FHx: CAD, FHx: HTN    Comorbid Conditions:   PVD    The following portions of the patient's history were reviewed and updated as appropriate: allergies, current medications, past family history, past medical history, past social history, past surgical history and problem list.    Preventative:  Over the past 2 weeks, have you felt down, depressed, or hopeless?No   Over the past 2 weeks, have you felt little interest or pleasure in doing things?No  Clinical depression screening refused by patient.No     On osteoporosis therapy?No     Past Medical Hx:  Past Medical History:   Diagnosis Date   • Artificial lens present     right   • Astigmatism     myopic   • Atherosclerosis of native arteries of extremity with intermittent claudication    • Benign essential hypertension    • Benign prostatic hyperplasia    • Cataract     R>L   • Corneal foreign body     Left eye, 2 years ago      • Essential hypertension    • Examination     individual health   • Exanthematous disorder    • Fracture of foot    • Hypercholesterolemia    • Hyperkalemia    • Hyperlipidemia    • Hypertensive  disorder    • Male erectile disorder    • Need for vaccination    • Peripheral vascular disease     Post RIGHT fem-pop bypass graft 1/7/13 Post thrombolysis to graft 12/15/14      • Peripheral vascular disease     unspecified   • Posterior subcapsular polar senile cataract    • Surgical follow-up care     R fem->pop bypass 1/7/13      • Tobacco dependence syndrome    • Tobacco user    • Urticaria     will give kenalog and benadyl , will order allery test      • Visual discomfort        Past Surgical Hx:  Past Surgical History:   Procedure Laterality Date   • ANGIOPLASTY      femoral-popliteal artery (dilation) (Abdominal aortogram, bilateral lower extremity runoff. Repair of left common femoral artery.)   11/26/2012    • FEMORAL POPLITEAL BYPASS      Right with 6 mm [place]-baron graft)   01/07/2013    • INJECTION OF MEDICATION  03/04/2013    kenalog(3)    • OTHER SURGICAL HISTORY      Remove cataract, insert lens (Cataract extraction with intraocular lens implantation, right eye.)   11/26/2013        Health Maintenance:  Health Maintenance   Topic Date Due   • ANNUAL PHYSICAL  09/15/2017   • PNEUMOCOCCAL VACCINES (65+ LOW/MEDIUM RISK) (1 of 2 - PCV13) 05/01/2018 (Originally 10/23/2017)   • INFLUENZA VACCINE  08/01/2018   • LIPID PANEL  01/09/2019   • COLONOSCOPY  09/14/2026   • TDAP/TD VACCINES (2 - Td) 09/14/2026   • HEPATITIS C SCREENING  Completed   • AAA SCREEN (ONE-TIME)  Completed   • ZOSTER VACCINE  Addressed       Current Meds:    Current Outpatient Prescriptions:   •  aspirin 81 MG tablet, Take 1 tablet by mouth Daily., Disp: 30 tablet, Rfl: 3  •  hydrALAZINE (APRESOLINE) 25 MG tablet, Take 1 tablet by mouth 3 (Three) Times a Day., Disp: 90 tablet, Rfl: 6  •  atorvastatin (LIPITOR) 80 MG tablet, take 1 tablet by mouth once daily, Disp: 30 tablet, Rfl: 6  •  clopidogrel (PLAVIX) 75 MG tablet, take 1 tablet by mouth once daily, Disp: 30 tablet, Rfl: 6  •  diltiaZEM CD (CARDIZEM CD) 300 MG 24 hr capsule, Take 1  "capsule by mouth Daily., Disp: 30 capsule, Rfl: 3    Allergies:  Lisinopril    Family Hx:  Family History   Problem Relation Age of Onset   • Cancer Other    • Heart disease Other    • Hypertension Other    • Stroke Other         Social History:  Social History     Social History   • Marital status:      Spouse name: N/A   • Number of children: N/A   • Years of education: N/A     Occupational History   • Not on file.     Social History Main Topics   • Smoking status: Current Every Day Smoker     Packs/day: 1.00     Types: Cigarettes   • Smokeless tobacco: Not on file      Comment: 1-1.5 PPD FOR 45 YEARS   • Alcohol use Yes      Comment: 36 to 48 oz a day   • Drug use: No   • Sexual activity: Defer     Other Topics Concern   • Not on file     Social History Narrative   • No narrative on file       Review of Systems  Review of Systems   Constitutional: Negative for activity change and appetite change.   HENT: Negative for congestion and dental problem.    Eyes: Negative for discharge and itching.   Respiratory: Negative for apnea.    Cardiovascular: Negative for chest pain and leg swelling.   Gastrointestinal: Negative for abdominal distention and abdominal pain.   Endocrine: Negative for cold intolerance.   Genitourinary: Positive for testicular pain (erectile dysfunction). Negative for difficulty urinating and enuresis.   Musculoskeletal: Negative for arthralgias and back pain.   Allergic/Immunologic: Negative for environmental allergies and food allergies.   Neurological: Negative for dizziness and facial asymmetry.   Hematological: Negative for adenopathy.   Psychiatric/Behavioral: Negative for agitation and behavioral problems.       Objective:     /86 (BP Location: Left arm, Patient Position: Sitting, Cuff Size: Adult)   Pulse 88   Ht 182.9 cm (72\")   Wt 63.6 kg (140 lb 4 oz)   SpO2 99%   BMI 19.02 kg/m²     General:  alert, appears stated age and cooperative   Oropharynx: lips, mucosa, and " tongue normal; teeth and gums normal    Eyes:  conjunctivae/corneas clear. PERRL, EOM's intact. Fundi benign.    Ears:  normal TM's and external ear canals both ears   Neck: no adenopathy, no carotid bruit, no JVD, supple, symmetrical, trachea midline and thyroid not enlarged, symmetric, no tenderness/mass/nodules   Thyroid:  no palpable nodule   Lung: clear to auscultation bilaterally   Heart:  regular rate and rhythm, S1, S2 normal, no murmur, click, rub or gallop   Abdomen: soft, non-tender; bowel sounds normal; no masses,  no organomegaly   Extremities: extremities normal, atraumatic, no cyanosis or edema   Skin: No lesions    Pulses: 2+ and symmetric   Neuro: normal without focal findings, mental status, speech normal, alert and oriented x3 and reflexes normal and symmetric       Lab Review  No visits with results within 1 Week(s) from this visit.   Latest known visit with results is:   Hospital Outpatient Visit on 01/24/2018   Component Date Value Ref Range Status   • Prox Ao Diam 01/25/2018 2.27  cm Final   • Mid Ao Diam 01/25/2018 2.12  cm Final   • Dist Ao Diam 01/25/2018 1.67  cm Final        Assessment/Plan:     Dwight was seen today for hypertension.    Diagnoses and all orders for this visit:    Essential hypertension  -     diltiaZEM CD (CARDIZEM CD) 300 MG 24 hr capsule; Take 1 capsule by mouth Daily.  -     hydrALAZINE (APRESOLINE) 25 MG tablet; Take 1 tablet by mouth 3 (Three) Times a Day.    Nicotine dependence, cigarettes, with other nicotine-induced disorders    Peripheral vascular disease      Plan:   HTN  1.  Rx changes: increase Cardizem to 300 mg.  2.  Education:    EKG ordered: no    Findings: N/A   Presented an overview of HTN, expected course, considerations, risk factors, and exacerbation prevention.   Discussed treatment options for HTN: yes   Recommended restricted dietary Na intake: yes   Recommended increased in dietary K intake: yes   Discussed patient action plan for HTN: yes  3.   Compliance at present is estimated to be fair. Efforts to improve compliance (if necessary) will be directed at dietary modifications: DASH diet (counseled) and increased exercise.  Recommended more frequent bp monitoring, such as every day or every other day at least.  4.  Follow up: 2 weeks.  Review daily bp log at that time.    Goals        Patient Stated    • cut back on smoking to 1/2 ppd (pt-stated)            Barrier:  Dependence          Preventative:  Male Preventative: Colon cancer screening is not up to date.    delayed   Recommended:Td, Varicella and Influenza, refused. Also refused colonoscopy/FIT.  .  Smoking cessation counseling was provided.    Previously declined low-dose CT.  does not drink  decrease soda or juice intake, increase water intake, increase physical activity, reduce portion size, cut out extra servings, reduce fast food intake and have 3 meals a day    RISK SCORE: 4           This document has been electronically signed by Ganga Acuña MD on April 27, 2018 10:58 PM

## 2018-05-08 ENCOUNTER — OFFICE VISIT (OUTPATIENT)
Dept: FAMILY MEDICINE CLINIC | Facility: CLINIC | Age: 68
End: 2018-05-08

## 2018-05-08 VITALS
HEIGHT: 72 IN | OXYGEN SATURATION: 100 % | SYSTOLIC BLOOD PRESSURE: 156 MMHG | WEIGHT: 140.19 LBS | BODY MASS INDEX: 18.99 KG/M2 | DIASTOLIC BLOOD PRESSURE: 78 MMHG | HEART RATE: 82 BPM

## 2018-05-08 DIAGNOSIS — F17.218 NICOTINE DEPENDENCE, CIGARETTES, WITH OTHER NICOTINE-INDUCED DISORDERS: ICD-10-CM

## 2018-05-08 DIAGNOSIS — I10 ESSENTIAL HYPERTENSION: Primary | ICD-10-CM

## 2018-05-08 PROCEDURE — 99213 OFFICE O/P EST LOW 20 MIN: CPT | Performed by: FAMILY MEDICINE

## 2018-05-08 RX ORDER — HYDRALAZINE HYDROCHLORIDE 50 MG/1
50 TABLET, FILM COATED ORAL 3 TIMES DAILY
Qty: 90 TABLET | Refills: 2 | Status: SHIPPED | OUTPATIENT
Start: 2018-05-08 | End: 2018-10-01 | Stop reason: SDUPTHER

## 2018-05-08 NOTE — PROGRESS NOTES
I discussed the case with the resident and I agree with their assessment and plan as outlined by Domenico .  Chacorta Sims MD.

## 2018-05-08 NOTE — PROGRESS NOTES
Subjective:     Chief Complaint   Patient presents with   • Hypertension     Dwight Ryder is a 67 y.o. male who presents for 2 week follow-up of HTN.     He is also following with Dr Robison for peripheral vascular disease.    New concerns:  none    Evaluation:   Blood pressure reading not indicated for medication reasons: No   Blood pressure reading refused by patient: No   Home blood pressure readings:  Normotensive, systolic 133-162, diastolic 76-84.  Two other systolic's in 150's.  He checks about once a day presently.   Blood pressure often elevated in physician office: Sometimes.  He just took his medications prior to coming here today.   Onset of symptoms: N/A .     Symptoms:   Headache: no   Visual disturbance: yes, chronic, has seen Dr Ray in past for senile cataracts   Fatigue: no   Dyspnea: no   Orthopnea: no   Edema: no   Chest pain: no   Palpitations: no   Diaphoresis: no    Risk Factors:   Tobacco use, HLD, FHx: CAD, FHx: HTN    Comorbid Conditions:   PVD    The following portions of the patient's history were reviewed and updated as appropriate: allergies, current medications, past family history, past medical history, past social history, past surgical history and problem list.    Preventative:  Over the past 2 weeks, have you felt down, depressed, or hopeless?No   Over the past 2 weeks, have you felt little interest or pleasure in doing things?No  Clinical depression screening refused by patient.No     On osteoporosis therapy?No     Past Medical Hx:  Past Medical History:   Diagnosis Date   • Artificial lens present     right   • Astigmatism     myopic   • Atherosclerosis of native arteries of extremity with intermittent claudication    • Benign essential hypertension    • Benign prostatic hyperplasia    • Cataract     R>L   • Corneal foreign body     Left eye, 2 years ago      • Essential hypertension    • Examination     individual health   • Exanthematous disorder    • Fracture of  foot    • Hypercholesterolemia    • Hyperkalemia    • Hyperlipidemia    • Hypertensive disorder    • Male erectile disorder    • Need for vaccination    • Peripheral vascular disease     Post RIGHT fem-pop bypass graft 1/7/13 Post thrombolysis to graft 12/15/14      • Peripheral vascular disease     unspecified   • Posterior subcapsular polar senile cataract    • Surgical follow-up care     R fem->pop bypass 1/7/13      • Tobacco dependence syndrome    • Tobacco user    • Urticaria     will give kenalog and benadyl , will order allery test      • Visual discomfort        Past Surgical Hx:  Past Surgical History:   Procedure Laterality Date   • ANGIOPLASTY      femoral-popliteal artery (dilation) (Abdominal aortogram, bilateral lower extremity runoff. Repair of left common femoral artery.)   11/26/2012    • FEMORAL POPLITEAL BYPASS      Right with 6 mm [place]-baron graft)   01/07/2013    • INJECTION OF MEDICATION  03/04/2013    kenalog(3)    • OTHER SURGICAL HISTORY      Remove cataract, insert lens (Cataract extraction with intraocular lens implantation, right eye.)   11/26/2013        Health Maintenance:  Health Maintenance   Topic Date Due   • MEDICARE ANNUAL WELLNESS  08/31/2016   • PNEUMOCOCCAL VACCINES (65+ LOW/MEDIUM RISK) (1 of 2 - PCV13) 07/01/2018 (Originally 10/23/2017)   • INFLUENZA VACCINE  08/01/2018   • LIPID PANEL  01/09/2019   • COLONOSCOPY  09/14/2026   • TDAP/TD VACCINES (2 - Td) 09/14/2026   • HEPATITIS C SCREENING  Completed   • AAA SCREEN (ONE-TIME)  Completed   • ZOSTER VACCINE  Addressed       Current Meds:    Current Outpatient Prescriptions:   •  aspirin 81 MG tablet, Take 1 tablet by mouth Daily., Disp: 30 tablet, Rfl: 3  •  atorvastatin (LIPITOR) 80 MG tablet, take 1 tablet by mouth once daily, Disp: 30 tablet, Rfl: 6  •  clopidogrel (PLAVIX) 75 MG tablet, take 1 tablet by mouth once daily, Disp: 30 tablet, Rfl: 6  •  diltiaZEM CD (CARDIZEM CD) 300 MG 24 hr capsule, Take 1 capsule by mouth  "Daily., Disp: 30 capsule, Rfl: 3  •  hydrALAZINE (APRESOLINE) 50 MG tablet, Take 1 tablet by mouth 3 (Three) Times a Day., Disp: 90 tablet, Rfl: 2    Allergies:  Lisinopril    Family Hx:  Family History   Problem Relation Age of Onset   • Cancer Other    • Heart disease Other    • Hypertension Other    • Stroke Other         Social History:  Social History     Social History   • Marital status:      Spouse name: N/A   • Number of children: N/A   • Years of education: N/A     Occupational History   • Not on file.     Social History Main Topics   • Smoking status: Current Every Day Smoker     Packs/day: 1.00     Types: Cigarettes   • Smokeless tobacco: Never Used      Comment: 1-1.5 PPD FOR 45 YEARS   • Alcohol use Yes      Comment: 36 to 48 oz a day   • Drug use: No   • Sexual activity: Defer     Other Topics Concern   • Not on file     Social History Narrative   • No narrative on file       Review of Systems  Review of Systems   Constitutional: Negative for activity change and appetite change.   HENT: Negative for congestion and dental problem.    Eyes: Negative for discharge and itching.   Respiratory: Negative for apnea.    Cardiovascular: Negative for chest pain and leg swelling.   Gastrointestinal: Negative for abdominal distention and abdominal pain.   Endocrine: Negative for cold intolerance.   Genitourinary: Positive for testicular pain (erectile dysfunction). Negative for difficulty urinating and enuresis.   Musculoskeletal: Negative for arthralgias and back pain.   Allergic/Immunologic: Negative for environmental allergies and food allergies.   Neurological: Negative for dizziness and facial asymmetry.   Hematological: Negative for adenopathy.   Psychiatric/Behavioral: Negative for agitation and behavioral problems.       Objective:     /78 (BP Location: Left arm, Patient Position: Sitting, Cuff Size: Adult)   Pulse 82   Ht 182.9 cm (72\")   Wt 63.6 kg (140 lb 3 oz)   SpO2 100%   BMI 19.01 " kg/m²     General:  alert, appears stated age and cooperative   Oropharynx: lips, mucosa, and tongue normal; teeth and gums normal    Eyes:  conjunctivae/corneas clear. PERRL, EOM's intact. Fundi benign.    Ears:  normal TM's and external ear canals both ears   Neck: no adenopathy, no carotid bruit, no JVD, supple, symmetrical, trachea midline and thyroid not enlarged, symmetric, no tenderness/mass/nodules   Thyroid:  no palpable nodule   Lung: clear to auscultation bilaterally   Heart:  regular rate and rhythm, S1, S2 normal, no murmur, click, rub or gallop   Abdomen: soft, non-tender; bowel sounds normal; no masses,  no organomegaly   Extremities: extremities normal, atraumatic, no cyanosis or edema   Skin: No lesions    Pulses: 2+ and symmetric   Neuro: normal without focal findings, mental status, speech normal, alert and oriented x3 and reflexes normal and symmetric       Lab Review  No visits with results within 1 Week(s) from this visit.   Latest known visit with results is:   Hospital Outpatient Visit on 01/24/2018   Component Date Value Ref Range Status   • Prox Ao Diam 01/25/2018 2.27  cm Final   • Mid Ao Diam 01/25/2018 2.12  cm Final   • Dist Ao Diam 01/25/2018 1.67  cm Final        Assessment/Plan:     Dwight was seen today for hypertension.    Diagnoses and all orders for this visit:    Essential hypertension  -     hydrALAZINE (APRESOLINE) 50 MG tablet; Take 1 tablet by mouth 3 (Three) Times a Day.    Nicotine dependence, cigarettes, with other nicotine-induced disorders      Plan:   HTN  1.  Rx changes: increase hydralazine to 50 mg.  2.  Education:    EKG ordered: no    Findings: N/A   Presented an overview of HTN, expected course, considerations, risk factors, and exacerbation prevention.   Discussed treatment options for HTN: yes   Recommended restricted dietary Na intake: yes   Recommended increased in dietary K intake: yes   Discussed patient action plan for HTN: yes  3.  Compliance at present  is estimated to be fair. Efforts to improve compliance (if necessary) will be directed at dietary modifications: DASH diet (counseled) and increased exercise.  Recommended more frequent bp monitoring, such as every day or every other day at least.  4.  Follow up: 2 weeks.  Review daily bp log at that time.    Goals        Patient Stated    • cut back on smoking to 1/2 ppd (pt-stated)            Barrier:  Dependence          Preventative:  Male Preventative: Colon cancer screening is not up to date.    delayed   Recommended:Td, Varicella and Influenza, refused. Also refused colonoscopy/FIT.  .  Smoking cessation counseling was provided.    Previously declined low-dose CT.  does not drink  decrease soda or juice intake, increase water intake, increase physical activity, reduce portion size, cut out extra servings, reduce fast food intake and have 3 meals a day    RISK SCORE: 4           This document has been electronically signed by Ganga Acuña MD on May 8, 2018 6:48 PM

## 2018-05-22 ENCOUNTER — OFFICE VISIT (OUTPATIENT)
Dept: FAMILY MEDICINE CLINIC | Facility: CLINIC | Age: 68
End: 2018-05-22

## 2018-05-22 VITALS
BODY MASS INDEX: 18.77 KG/M2 | SYSTOLIC BLOOD PRESSURE: 120 MMHG | OXYGEN SATURATION: 98 % | HEIGHT: 72 IN | HEART RATE: 76 BPM | WEIGHT: 138.56 LBS | DIASTOLIC BLOOD PRESSURE: 60 MMHG

## 2018-05-22 DIAGNOSIS — F17.218 NICOTINE DEPENDENCE, CIGARETTES, WITH OTHER NICOTINE-INDUCED DISORDERS: ICD-10-CM

## 2018-05-22 DIAGNOSIS — I10 ESSENTIAL HYPERTENSION: Primary | ICD-10-CM

## 2018-05-22 PROCEDURE — 99213 OFFICE O/P EST LOW 20 MIN: CPT | Performed by: FAMILY MEDICINE

## 2018-05-28 NOTE — PROGRESS NOTES
Subjective:     Chief Complaint   Patient presents with   • Hypertension     Dwight Ryder is a 67 y.o. male who presents for 2 week follow-up of HTN.     He is also following with Dr Robison for peripheral vascular disease.    New concerns:  none    Evaluation:   Blood pressure reading not indicated for medication reasons: No   Blood pressure reading refused by patient: No   Home blood pressure readings:  Normotensive, systolic 142-154, diastolic 61-80.  Generally at goal.  He checks about once a day presently.   Blood pressure often elevated in physician office: Sometimes.  He just took his medications prior to coming here today.   Onset of symptoms: N/A .     Symptoms:   Headache: no   Visual disturbance: yes, chronic, has seen Dr Ray in past for senile cataracts   Fatigue: no   Dyspnea: no   Orthopnea: no   Edema: no   Chest pain: no   Palpitations: no   Diaphoresis: no    Risk Factors:   Tobacco use, HLD, FHx: CAD, FHx: HTN    Comorbid Conditions:   PVD    The following portions of the patient's history were reviewed and updated as appropriate: allergies, current medications, past family history, past medical history, past social history, past surgical history and problem list.    Preventative:  Over the past 2 weeks, have you felt down, depressed, or hopeless?No   Over the past 2 weeks, have you felt little interest or pleasure in doing things?No  Clinical depression screening refused by patient.No     On osteoporosis therapy?No     Past Medical Hx:  Past Medical History:   Diagnosis Date   • Artificial lens present     right   • Astigmatism     myopic   • Atherosclerosis of native arteries of extremity with intermittent claudication    • Benign essential hypertension    • Benign prostatic hyperplasia    • Cataract     R>L   • Corneal foreign body     Left eye, 2 years ago      • Essential hypertension    • Examination     individual health   • Exanthematous disorder    • Fracture of foot    •  Hypercholesterolemia    • Hyperkalemia    • Hyperlipidemia    • Hypertensive disorder    • Male erectile disorder    • Need for vaccination    • Peripheral vascular disease     Post RIGHT fem-pop bypass graft 1/7/13 Post thrombolysis to graft 12/15/14      • Peripheral vascular disease     unspecified   • Posterior subcapsular polar senile cataract    • Surgical follow-up care     R fem->pop bypass 1/7/13      • Tobacco dependence syndrome    • Tobacco user    • Urticaria     will give kenalog and benadyl , will order allery test      • Visual discomfort        Past Surgical Hx:  Past Surgical History:   Procedure Laterality Date   • ANGIOPLASTY      femoral-popliteal artery (dilation) (Abdominal aortogram, bilateral lower extremity runoff. Repair of left common femoral artery.)   11/26/2012    • FEMORAL POPLITEAL BYPASS      Right with 6 mm Central-baron graft)   01/07/2013    • INJECTION OF MEDICATION  03/04/2013    kenalog(3)    • OTHER SURGICAL HISTORY      Remove cataract, insert lens (Cataract extraction with intraocular lens implantation, right eye.)   11/26/2013        Health Maintenance:  Health Maintenance   Topic Date Due   • ANNUAL PHYSICAL  10/23/1953   • PNEUMOCOCCAL VACCINES (65+ LOW/MEDIUM RISK) (1 of 2 - PCV13) 07/01/2018 (Originally 10/23/2017)   • ZOSTER VACCINE (2 of 2) 07/01/2018 (Originally 11/9/2016)   • INFLUENZA VACCINE  08/01/2018   • LIPID PANEL  01/09/2019   • COLONOSCOPY  09/14/2026   • TDAP/TD VACCINES (2 - Td) 09/14/2026   • HEPATITIS C SCREENING  Completed   • AAA SCREEN (ONE-TIME)  Completed       Current Meds:    Current Outpatient Prescriptions:   •  aspirin 81 MG tablet, Take 1 tablet by mouth Daily., Disp: 30 tablet, Rfl: 3  •  atorvastatin (LIPITOR) 80 MG tablet, take 1 tablet by mouth once daily, Disp: 30 tablet, Rfl: 6  •  clopidogrel (PLAVIX) 75 MG tablet, take 1 tablet by mouth once daily, Disp: 30 tablet, Rfl: 6  •  diltiaZEM CD (CARDIZEM CD) 300 MG 24 hr capsule, Take 1 capsule  "by mouth Daily., Disp: 30 capsule, Rfl: 3  •  hydrALAZINE (APRESOLINE) 50 MG tablet, Take 1 tablet by mouth 3 (Three) Times a Day., Disp: 90 tablet, Rfl: 2    Allergies:  Lisinopril    Family Hx:  Family History   Problem Relation Age of Onset   • Cancer Other    • Heart disease Other    • Hypertension Other    • Stroke Other         Social History:  Social History     Social History   • Marital status:      Spouse name: N/A   • Number of children: N/A   • Years of education: N/A     Occupational History   • Not on file.     Social History Main Topics   • Smoking status: Current Every Day Smoker     Packs/day: 1.00     Types: Cigarettes   • Smokeless tobacco: Never Used      Comment: 1-1.5 PPD FOR 45 YEARS   • Alcohol use Yes      Comment: 36 to 48 oz a day   • Drug use: No   • Sexual activity: Defer     Other Topics Concern   • Not on file     Social History Narrative   • No narrative on file       Review of Systems  Review of Systems   Constitutional: Negative for activity change and appetite change.   HENT: Negative for congestion and dental problem.    Eyes: Negative for discharge and itching.   Respiratory: Negative for apnea.    Cardiovascular: Negative for chest pain and leg swelling.   Gastrointestinal: Negative for abdominal distention and abdominal pain.   Endocrine: Negative for cold intolerance.   Genitourinary: Positive for testicular pain (erectile dysfunction). Negative for difficulty urinating and enuresis.   Musculoskeletal: Negative for arthralgias and back pain.   Allergic/Immunologic: Negative for environmental allergies and food allergies.   Neurological: Negative for dizziness and facial asymmetry.   Hematological: Negative for adenopathy.   Psychiatric/Behavioral: Negative for agitation and behavioral problems.       Objective:     /60 (BP Location: Left arm, Patient Position: Sitting, Cuff Size: Adult)   Pulse 76   Ht 182.9 cm (72\")   Wt 62.9 kg (138 lb 9 oz)   SpO2 98%   " BMI 18.79 kg/m²     General:  alert, appears stated age and cooperative   Oropharynx: lips, mucosa, and tongue normal; teeth and gums normal    Eyes:  conjunctivae/corneas clear. PERRL, EOM's intact. Fundi benign.    Ears:  normal TM's and external ear canals both ears   Neck: no adenopathy, no carotid bruit, no JVD, supple, symmetrical, trachea midline and thyroid not enlarged, symmetric, no tenderness/mass/nodules   Thyroid:  no palpable nodule   Lung: clear to auscultation bilaterally   Heart:  regular rate and rhythm, S1, S2 normal, no murmur, click, rub or gallop   Abdomen: soft, non-tender; bowel sounds normal; no masses,  no organomegaly   Extremities: extremities normal, atraumatic, no cyanosis or edema   Skin: No lesions    Pulses: 2+ and symmetric   Neuro: normal without focal findings, mental status, speech normal, alert and oriented x3 and reflexes normal and symmetric       Lab Review  No visits with results within 1 Week(s) from this visit.   Latest known visit with results is:   Hospital Outpatient Visit on 01/24/2018   Component Date Value Ref Range Status   • Prox Ao Diam 01/24/2018 2.27  cm Final   • Mid Ao Diam 01/24/2018 2.12  cm Final   • Dist Ao Diam 01/24/2018 1.67  cm Final        Assessment/Plan:     Dwight was seen today for hypertension.    Diagnoses and all orders for this visit:    Essential hypertension    Nicotine dependence, cigarettes, with other nicotine-induced disorders      Plan:   HTN  1.  Rx changes:None  2.  Education:    EKG ordered: no    Findings: N/A   Presented an overview of HTN, expected course, considerations, risk factors, and exacerbation prevention.   Discussed treatment options for HTN: yes   Recommended restricted dietary Na intake: yes   Recommended increased in dietary K intake: yes   Discussed patient action plan for HTN: yes  3.  Compliance at present is estimated to be fair. Efforts to improve compliance (if necessary) will be directed at dietary  modifications: DASH diet (counseled) and increased exercise.  Recommended more frequent bp monitoring, such as every day or every other day at least.  4.  Follow up: 3 months.    Goals        Patient Stated    • cut back on smoking to 1/2 ppd (pt-stated)            Barrier:  Dependence          Preventative:  Male Preventative: Colon cancer screening is not up to date.    delayed   Recommended:Td, Varicella and Influenza, refused. Also refused colonoscopy/FIT.  .  Smoking cessation counseling was provided.    Previously declined low-dose CT.  does not drink  decrease soda or juice intake, increase water intake, increase physical activity, reduce portion size, cut out extra servings, reduce fast food intake and have 3 meals a day  Patient's Body mass index is 18.79 kg/m². BMI is below normal parameters. Recommendations include: treating the underlying disease process.    RISK SCORE: 4           This document has been electronically signed by Ganga Acuña MD on May 28, 2018 2:57 PM

## 2018-06-07 ENCOUNTER — OFFICE VISIT (OUTPATIENT)
Dept: CARDIAC SURGERY | Facility: CLINIC | Age: 68
End: 2018-06-07

## 2018-06-07 VITALS
HEIGHT: 72 IN | OXYGEN SATURATION: 99 % | BODY MASS INDEX: 18.96 KG/M2 | HEART RATE: 81 BPM | WEIGHT: 140 LBS | DIASTOLIC BLOOD PRESSURE: 60 MMHG | SYSTOLIC BLOOD PRESSURE: 140 MMHG | TEMPERATURE: 98.2 F

## 2018-06-07 DIAGNOSIS — I73.9 PERIPHERAL VASCULAR DISEASE (HCC): ICD-10-CM

## 2018-06-07 DIAGNOSIS — I70.211 ATHEROSCLEROSIS OF NATIVE ARTERY OF RIGHT LOWER EXTREMITY WITH INTERMITTENT CLAUDICATION (HCC): Primary | ICD-10-CM

## 2018-06-07 PROCEDURE — 99213 OFFICE O/P EST LOW 20 MIN: CPT | Performed by: NURSE PRACTITIONER

## 2018-06-07 NOTE — PATIENT INSTRUCTIONS
Vascular tests today show normal blood flow to both feet  Repeat 6 mths unless If signs and symptoms of ischemia should occur including but not limited to pale/blue discoloration of limb, increasing pain with ambulation or at rest, or a non-healing wound. Patient is to notify [facility] for immediate evaluation.  Recommend continue medical management with antiplatelet therapy, VIT D, and statin therapy.  Secondary ACE/BB  Vitamin D has been shown to promote healthy lining of arteries.  Check level and replace as needed  Recommend life style changes/Risk factor modification:   Recommend smoking cessation or reduction.  Smoking cessation assistance options offered including behavioral counseling (Smoking Cessation Classes), Nicotine replacement therapy (patches or gum). Understands tobacco increases risk of expanding AAA, MI, CVA, PAD, carcinoma.  Regular exercise morning:  Bicycling or Walking total of 45 minutes per day in minimum of 15 minute intervals    Do not go barefoot.  Non perfumed cream for feet for dry skin, consider BagBalm  Obtain mirror to view back of toes and feet.    e.

## 2018-06-27 NOTE — PROGRESS NOTES
Subjective   Patient ID: Dwight Ryder is a 67 y.o. male is here today for follow-up PVD.  Chief Complaint   Patient presents with   • Peripheral Vascular Disease     6 month follow up    Denies any claudication, ambulation limited by back pain, No open sores  No distal color changes   History of Present Illness  PCP:  Ganga Acuña MD    67 y.o. male with HTN(stable), dyslipidemia(stable), BPH, PVD(stable), CKD2(stable).  smokes 1 PPD.  No claudication, rest pain, tissue loss.  . Continues to smoke with no desire to attempt cessation.  No other associated symptoms or modifying factors.    2012 RIGHT SFA stent  2013 RIGHT femoral to popliteal artery bypass (PTFE)  12/15/2014 RIGHT lower extremity angiogram:  RIGHT fem-pop thrombolysis, EKOS.  12/16/2014 RIGHT lower extremity angiogram:  Patient was taken to the angio suite and placed in supine position. Right lower extremity arteriogram was performed through the 6-Thai sheath demonstrating a widely patent external iliac artery, common femoral artery, and profunda femoris artery. SFA is occluded. Fem/pop graft is widely patent with no significant flow-limiting stenosis. Mild irregularity at the distal anastomosis. No flow-limiting stenosis. Popliteal artery appears normal. Anterior tibial artery is patent to the foot. Posterior tibial artery is patent to the foot. Peroneal artery is not well seen. Plantar arch is not intact. Good flow to the digital arteries. No intervention  indicated. Good pulse in the foot at the completion of the case.  11/22/2016 RANGEL:  RIGHT 1.1 triphasic.  LEFT 1.1 triphasic.  Graft 169cm/s, triphasic.  5/22/2017 RANGEL:  RIGHT 1.1 triphasic.  LEFT 1.0 triphasic.  Graft 186cm/s, triphasic.  12/06/17   RANGEL:  RIGHT 1.12  Triphasic.  LEFT 0.99  Triphasic.  Graft Patent 237 cm/s, triphasic.  06/07/18  RANGEL:  RIGHT 0.99  Triphasic.  LEFT 0.98  Triphasic.      The following portions of the patient's history were reviewed and updated as  appropriate: allergies, current medications, past family history, past medical history, past social history, past surgical history and problem list.  See HPI   Allergies   Allergen Reactions   • Lisinopril Angioedema       Current Outpatient Prescriptions:   •  aspirin 81 MG tablet, Take 1 tablet by mouth Daily., Disp: 30 tablet, Rfl: 3  •  atorvastatin (LIPITOR) 80 MG tablet, take 1 tablet by mouth once daily, Disp: 30 tablet, Rfl: 6  •  clopidogrel (PLAVIX) 75 MG tablet, take 1 tablet by mouth once daily, Disp: 30 tablet, Rfl: 6  •  diltiaZEM CD (CARDIZEM CD) 300 MG 24 hr capsule, Take 1 capsule by mouth Daily., Disp: 30 capsule, Rfl: 3  •  hydrALAZINE (APRESOLINE) 50 MG tablet, Take 1 tablet by mouth 3 (Three) Times a Day., Disp: 90 tablet, Rfl: 2    Review of Systems   Constitution: Negative for chills, decreased appetite, weakness and malaise/fatigue.   HENT: Negative for hearing loss, hoarse voice, nosebleeds and stridor.    Eyes: Negative for visual disturbance.   Cardiovascular: Negative for chest pain, claudication, cyanosis, dyspnea on exertion, irregular heartbeat and leg swelling.        LE:  N Open sores  N color changes  N coldness.           N numbness or paresthesias   Respiratory: Positive for cough. Negative for hemoptysis and shortness of breath.    Hematologic/Lymphatic: Does not bruise/bleed easily.   Skin: Positive for dry skin. Negative for color change, flushing, itching, poor wound healing and rash.   Musculoskeletal: Positive for back pain. Negative for falls, muscle cramps and muscle weakness.        Spinal stenosis    Gastrointestinal: Negative for abdominal pain, anorexia, hematemesis and melena.   Genitourinary: Negative for hematuria.   Neurological: Negative for brief paralysis, dizziness, focal weakness, numbness, paresthesias and sensory change.   Psychiatric/Behavioral: Negative for altered mental status.   Allergic/Immunologic: Negative for hives.        Objective   Physical Exam    Constitutional: He is oriented to person, place, and time. He appears well-nourished.   HENT:   Head: Normocephalic.   Mouth/Throat: Oropharynx is clear and moist.   Eyes: Conjunctivae are normal. Pupils are equal, round, and reactive to light.   Neck: Neck supple. No JVD present.   Cardiovascular: Normal rate, regular rhythm, normal heart sounds and intact distal pulses.    Pulses:       Carotid pulses are 1+ on the right side, and 1+ on the left side.       Radial pulses are 2+ on the right side, and 2+ on the left side.        Dorsalis pedis pulses are 2+ on the right side, and 2+ on the left side.        Posterior tibial pulses are 2+ on the right side, and 2+ on the left side.   LE:  W/d/p cap refil<3 sec no edema  Light pink post toes  No paleness  Intact mobility and sensation      Pulmonary/Chest: Effort normal and breath sounds normal. No stridor.   Abdominal: Soft. Bowel sounds are normal.   Musculoskeletal: He exhibits no edema or tenderness.       Neurological Sensory Findings -  Unaltered sharp/dull right ankle/foot discrimination and unaltered sharp/dull left ankle/foot discrimination.  Vascular Status -  His right foot exhibits abnormal foot vasculature . His right foot exhibits no edema. His left foot exhibits normal foot vasculature  and no edema.  Skin Integrity  -  His right foot skin is intact.His left foot skin is intact..  Neurological: He is alert and oriented to person, place, and time.   Skin: Skin is warm and dry. No rash noted. No erythema. No pallor.   RLE with spider varcosities    Psychiatric: His behavior is normal.   Nursing note and vitals reviewed.      Vitals:    06/07/18 1117   BP: 140/60   Pulse: 81   Temp: 98.2 °F (36.8 °C)   SpO2: 99%         Assessment/Plan    Detailed discussion regarding risks, benefits, and treatment plan.  Patient understands, agrees, and wishes to proceed with plan.     Independent Review of Radiographic Studies:    06/07/18  RANGEL:  RIGHT 0.99   Triphasic.  LEFT 0.98  Triphasic.      1. Atherosclerosis of native artery of right lower extremity with intermittent claudication  Good distal perfusion  Recommend continue medical management with antiplatelet therapy, and statin therapy.    Vitamin D has been shown to promote healthy lining of arteries.  Check level and replace as needed.   Recommend healthy life style:   Consider smoking reduction or cessation, as below.  Walking total of 45 minutes per day in minimum of 15 minute intervals   Avoid  going  barefoot.  Non perfumed cream for for dry skin  Repeat in 6 months unless   If signs and symptoms of ischemia should occur including but not limited to pale/blue discoloration of limb, increasing pain with ambulation or at rest, or a non-healing wound. Patient is to notify [facility] center for immediate evaluation.  - Vitamin D 25 Hydroxy; Future    2. Peripheral vascular disease  Repeat 6 mths unless If signs and symptoms of ischemia should occur including but not limited to pale/blue discoloration of limb, increasing pain with ambulation or at rest, or a non-healing wound. Patient is to notify [facility] center for immediate evaluation.  Recommend continue medical management with antiplatelet therapy, VIT D, and statin therapy.  Secondary ACE/BB  Vitamin D has been shown to promote healthy lining of arteries.  Check level and replace as needed  Recommend life style changes/Risk factor modification:   Recommend smoking cessation or reduction.  Smoking cessation assistance options offered including behavioral counseling (Smoking Cessation Classes), Nicotine replacement therapy (patches or gum). Understands tobacco increases risk of expanding AAA, MI, CVA, PAD, carcinoma.  Regular exercise morning:  Bicycling or Walking total of 45 minutes per day in minimum of 15 minute intervals    Do not go barefoot.  Non perfumed cream for feet for dry skin, consider BagBalm  Obtain mirror to view back of  toes and feet.     3. Nicotine dependence, cigarettes, with other nicotine-induced disorders  Consider smoking cessation or reduction.  Pt is not interested  Declined NRP  Will advise if changes mind.  Aware of negative health  aspects of smoking, as above

## 2018-08-21 RX ORDER — DILTIAZEM HYDROCHLORIDE 300 MG/1
CAPSULE, EXTENDED RELEASE ORAL
Qty: 30 CAPSULE | Refills: 0 | Status: SHIPPED | OUTPATIENT
Start: 2018-08-21 | End: 2018-10-01 | Stop reason: SDUPTHER

## 2018-10-01 ENCOUNTER — OFFICE VISIT (OUTPATIENT)
Dept: FAMILY MEDICINE CLINIC | Facility: CLINIC | Age: 68
End: 2018-10-01

## 2018-10-01 VITALS
BODY MASS INDEX: 19.42 KG/M2 | WEIGHT: 143.4 LBS | HEIGHT: 72 IN | SYSTOLIC BLOOD PRESSURE: 138 MMHG | HEART RATE: 78 BPM | DIASTOLIC BLOOD PRESSURE: 80 MMHG | OXYGEN SATURATION: 97 %

## 2018-10-01 DIAGNOSIS — I73.9 PERIPHERAL VASCULAR DISEASE (HCC): ICD-10-CM

## 2018-10-01 DIAGNOSIS — I10 ESSENTIAL HYPERTENSION: ICD-10-CM

## 2018-10-01 PROCEDURE — 99213 OFFICE O/P EST LOW 20 MIN: CPT | Performed by: FAMILY MEDICINE

## 2018-10-01 RX ORDER — ATORVASTATIN CALCIUM 80 MG/1
80 TABLET, FILM COATED ORAL DAILY
Qty: 30 TABLET | Refills: 6 | Status: SHIPPED | OUTPATIENT
Start: 2018-10-01 | End: 2019-03-27 | Stop reason: SDUPTHER

## 2018-10-01 RX ORDER — CLOPIDOGREL BISULFATE 75 MG/1
75 TABLET ORAL DAILY
Qty: 30 TABLET | Refills: 6 | Status: SHIPPED | OUTPATIENT
Start: 2018-10-01 | End: 2019-03-27 | Stop reason: SDUPTHER

## 2018-10-01 RX ORDER — DILTIAZEM HYDROCHLORIDE 300 MG/1
300 CAPSULE, COATED, EXTENDED RELEASE ORAL DAILY
Qty: 30 CAPSULE | Refills: 3 | Status: SHIPPED | OUTPATIENT
Start: 2018-10-01 | End: 2019-03-01 | Stop reason: SDUPTHER

## 2018-10-01 RX ORDER — HYDRALAZINE HYDROCHLORIDE 50 MG/1
50 TABLET, FILM COATED ORAL 3 TIMES DAILY
Qty: 90 TABLET | Refills: 2 | Status: SHIPPED | OUTPATIENT
Start: 2018-10-01 | End: 2019-01-30 | Stop reason: SDUPTHER

## 2018-10-01 NOTE — PROGRESS NOTES
Subjective:     Dwight Ryder is a 67 y.o. male who presents for follow up for hypertension:    Hypertension:   Patient was diagnosed with arterial hypertension 20 years ago. He does check his blood pressure at home. This usually runs 130/60. He does not know of anything that causes his BP to be more elevated. Patient denies  episodes of dizziness. Patient is currently on Cardizem 300 mg daily and Hydralazine 50 mg TID . He is compliant with these. He follows a low salt diet. Patient denies  headaches, blurry vision, chest pain, or heart palpitations.      Preventative:  Over the past 2 weeks, have you felt down, depressed, or hopeless?No   Over the past 2 weeks, have you felt little interest or pleasure in doing things?No  Clinical depression screening refused by patient.No     On osteoporosis therapy?Not Indicated     Past Medical Hx:  Past Medical History:   Diagnosis Date   • Artificial lens present     right   • Astigmatism     myopic   • Atherosclerosis of native arteries of extremity with intermittent claudication (CMS/HCC)    • Benign essential hypertension    • Benign prostatic hyperplasia    • Cataract     R>L   • Corneal foreign body     Left eye, 2 years ago      • Essential hypertension    • Examination     individual health   • Exanthematous disorder    • Fracture of foot    • Hypercholesterolemia    • Hyperkalemia    • Hyperlipidemia    • Hypertensive disorder    • Male erectile disorder    • Need for vaccination    • Peripheral vascular disease (CMS/HCC)     Post RIGHT fem-pop bypass graft 1/7/13 Post thrombolysis to graft 12/15/14      • Peripheral vascular disease (CMS/HCC)     unspecified   • Posterior subcapsular polar senile cataract    • Surgical follow-up care     R fem->pop bypass 1/7/13      • Tobacco dependence syndrome    • Tobacco user    • Urticaria     will give kenalog and benadyl , will order allery test      • Visual discomfort        Past Surgical Hx:  Past Surgical History:    Procedure Laterality Date   • ANGIOPLASTY      femoral-popliteal artery (dilation) (Abdominal aortogram, bilateral lower extremity runoff. Repair of left common femoral artery.)   11/26/2012    • FEMORAL POPLITEAL BYPASS      Right with 6 mm [place]-baron graft)   01/07/2013    • INJECTION OF MEDICATION  03/04/2013    kenalog(3)    • OTHER SURGICAL HISTORY      Remove cataract, insert lens (Cataract extraction with intraocular lens implantation, right eye.)   11/26/2013        Health Maintenance:  Health Maintenance   Topic Date Due   • ANNUAL PHYSICAL  10/23/1953   • LUNG CANCER SCREENING  10/23/2007   • ZOSTER VACCINE (2 of 2) 11/09/2016   • PNEUMOCOCCAL VACCINES (65+ LOW/MEDIUM RISK) (1 of 2 - PCV13) 10/23/2017   • INFLUENZA VACCINE  08/01/2018   • LIPID PANEL  01/09/2019   • COLONOSCOPY  09/14/2026   • TDAP/TD VACCINES (2 - Td) 09/14/2026   • HEPATITIS C SCREENING  Completed   • AAA SCREEN (ONE-TIME)  Completed       Current Meds:    Current Outpatient Prescriptions:   •  aspirin 81 MG tablet, Take 1 tablet by mouth Daily., Disp: 30 tablet, Rfl: 3  •  atorvastatin (LIPITOR) 80 MG tablet, Take 1 tablet by mouth Daily., Disp: 30 tablet, Rfl: 6  •  clopidogrel (PLAVIX) 75 MG tablet, Take 1 tablet by mouth Daily., Disp: 30 tablet, Rfl: 6  •  diltiaZEM CD (CARDIZEM CD) 300 MG 24 hr capsule, Take 1 capsule by mouth Daily., Disp: 30 capsule, Rfl: 3  •  hydrALAZINE (APRESOLINE) 50 MG tablet, Take 1 tablet by mouth 3 (Three) Times a Day., Disp: 90 tablet, Rfl: 2    Allergies:  Lisinopril    Family Hx:  Family History   Problem Relation Age of Onset   • Cancer Other    • Heart disease Other    • Hypertension Other    • Stroke Other         Social History:  Social History     Social History   • Marital status:      Spouse name: N/A   • Number of children: N/A   • Years of education: N/A     Occupational History   • Not on file.     Social History Main Topics   • Smoking status: Current Every Day Smoker     Packs/day:  "1.00     Years: 30.00     Types: Cigarettes   • Smokeless tobacco: Never Used      Comment: 1-1.5 PPD FOR 30 YEARS   • Alcohol use Yes      Comment: 36 to 48 oz a day   • Drug use: No   • Sexual activity: Defer     Other Topics Concern   • Not on file     Social History Narrative   • No narrative on file       Review of Systems  Review of Systems   Constitutional: Negative for chills and fever.   HENT: Negative for congestion, ear pain, hearing loss, rhinorrhea, sore throat and trouble swallowing.    Eyes: Negative for pain and visual disturbance.   Respiratory: Negative for cough and shortness of breath.    Cardiovascular: Negative for chest pain, palpitations and leg swelling.   Gastrointestinal: Negative for abdominal pain, constipation, diarrhea, nausea and vomiting.   Genitourinary: Negative for dysuria and hematuria.   Musculoskeletal: Positive for back pain. Negative for neck pain.   Skin: Negative for rash and wound.   Neurological: Negative for weakness and headaches.   Psychiatric/Behavioral: Negative for dysphoric mood and sleep disturbance. The patient is not nervous/anxious.        Objective:     /80   Pulse 78   Ht 182.9 cm (72\")   Wt 65 kg (143 lb 6.4 oz)   SpO2 97%   BMI 19.45 kg/m²   Physical Exam   Constitutional: He is oriented to person, place, and time. He appears well-developed and well-nourished.   HENT:   Head: Normocephalic and atraumatic.   Mouth/Throat: Oropharynx is clear and moist.   Eyes: Pupils are equal, round, and reactive to light. Conjunctivae and EOM are normal.   Neck: Normal range of motion. Neck supple. No tracheal deviation present. No thyromegaly present.   Cardiovascular: Normal rate, regular rhythm and normal heart sounds.  Exam reveals no gallop and no friction rub.    No murmur heard.  Pulmonary/Chest: Effort normal and breath sounds normal. No respiratory distress. He has no wheezes. He has no rales.   Abdominal: Soft. Bowel sounds are normal. He exhibits no " distension. There is no tenderness.   Musculoskeletal: Normal range of motion. He exhibits no tenderness.   Lymphadenopathy:     He has no cervical adenopathy.   Neurological: He is alert and oriented to person, place, and time. No cranial nerve deficit.   Skin: Skin is warm and dry. No rash noted. No erythema.   Psychiatric: He has a normal mood and affect. His behavior is normal. Thought content normal.   Vitals reviewed.    Assessment/Plan:     Dwight was seen today for hypertension.    Diagnoses and all orders for this visit:    Essential hypertension: This is a chronic stable problem. I will refill the Hydralazine and Cardizem as below. I will continue these and not make any changes today as his BP is at goal.   -     hydrALAZINE (APRESOLINE) 50 MG tablet; Take 1 tablet by mouth 3 (Three) Times a Day.  -     diltiaZEM CD (CARDIZEM CD) 300 MG 24 hr capsule; Take 1 capsule by mouth Daily.    Peripheral vascular disease (CMS/HCC): I will refilled the medications as below.   -     clopidogrel (PLAVIX) 75 MG tablet; Take 1 tablet by mouth Daily.  -     atorvastatin (LIPITOR) 80 MG tablet; Take 1 tablet by mouth Daily.  -     aspirin 81 MG tablet; Take 1 tablet by mouth Daily.           Follow-up:     Return in about 3 months (around 1/1/2019).      Goals        Patient Stated    • cut back on smoking to 1/2 ppd (pt-stated)            Barrier:  Dependence            Preventative:    Vaccines Recommended at this visit:   Influenza and Prevnar 13    Vaccines Received at this visit:  Patient refused all vaccinations at this visit.    Screenings Recommended at this visit:  Low Dose CT (lung cancer)    Screenings Ordered at this visit:  Patient refused all screenings offer at this visit.    Smoking Status:  Patient is current smoker. Patient is not interested in smoking cessation.    Alcohol Intake:  Regular (moderate)    Patient's Body mass index is 19.45 kg/m². BMI is within normal parameters. No follow-up required.          RISK SCORE: 3              This document has been electronically signed by Vineet Guererro MD on October 8, 2018 8:37 AM

## 2018-10-08 NOTE — PROGRESS NOTES
I have reviewed the notes, assessments, and/or procedures performed by Dr Guerrero, I concur with her/his documentation and assessment and plan for Dwight Ryder        This document has been electronically signed by Gilbert Smith MD on October 8, 2018 9:39 AM

## 2018-10-09 NOTE — PROGRESS NOTES
I have reviewed the notes, assessments, and/or procedures performed by Dr. Guerrero, I concur with her/his documentation and assessment and plan for Dwight Ryder.       This document has been electronically signed by Reji Walters MD on October 9, 2018 4:34 PM

## 2018-12-18 ENCOUNTER — OFFICE VISIT (OUTPATIENT)
Dept: CARDIAC SURGERY | Facility: CLINIC | Age: 68
End: 2018-12-18

## 2018-12-18 VITALS
WEIGHT: 145.3 LBS | TEMPERATURE: 98 F | BODY MASS INDEX: 19.68 KG/M2 | SYSTOLIC BLOOD PRESSURE: 124 MMHG | OXYGEN SATURATION: 99 % | HEIGHT: 72 IN | HEART RATE: 70 BPM | DIASTOLIC BLOOD PRESSURE: 66 MMHG

## 2018-12-18 DIAGNOSIS — I73.9 PERIPHERAL VASCULAR DISEASE (HCC): Primary | ICD-10-CM

## 2018-12-18 PROCEDURE — 99213 OFFICE O/P EST LOW 20 MIN: CPT | Performed by: NURSE PRACTITIONER

## 2018-12-18 PROCEDURE — 99406 BEHAV CHNG SMOKING 3-10 MIN: CPT | Performed by: NURSE PRACTITIONER

## 2018-12-18 NOTE — PROGRESS NOTES
Subjective   Patient ID: Dwight Ryder is a 68 y.o. male is here today for follow-up PVD.  Chief Complaint   Patient presents with   • Peripheral Vascular Disease     6 mo f/u   Denies any claudication, ambulation limited by back pain, No open sores  No distal color changes   History of Present Illness  PCP:  Ganga Acuña MD    67 y.o. male with HTN(stable), dyslipidemia(stable), BPH, PVD(stable), CKD2(stable).  smokes 1 PPD.  No claudication, rest pain, tissue loss.  . Continues to smoke with no desire to attempt cessation.  No other associated symptoms or modifying factors.    2012 RIGHT SFA stent  2013 RIGHT femoral to popliteal artery bypass (PTFE)  12/15/2014 RIGHT lower extremity angiogram:  RIGHT fem-pop thrombolysis, EKOS.  12/16/2014 RIGHT lower extremity angiogram:  Patient was taken to the angio suite and placed in supine position. Right lower extremity arteriogram was performed through the 6-Greek sheath demonstrating a widely patent external iliac artery, common femoral artery, and profunda femoris artery. SFA is occluded. Fem/pop graft is widely patent with no significant flow-limiting stenosis. Mild irregularity at the distal anastomosis. No flow-limiting stenosis. Popliteal artery appears normal. Anterior tibial artery is patent to the foot. Posterior tibial artery is patent to the foot. Peroneal artery is not well seen. Plantar arch is not intact. Good flow to the digital arteries. No intervention  indicated. Good pulse in the foot at the completion of the case.  11/22/2016 RANGEL:  RIGHT 1.1 triphasic.  LEFT 1.1 triphasic.  Graft 169cm/s, triphasic.  5/22/2017 RANGEL:  RIGHT 1.1 triphasic.  LEFT 1.0 triphasic.  Graft 186cm/s, triphasic.  12/06/17   RANGEL:  RIGHT 1.12  Triphasic.  LEFT 0.99  Triphasic.  Graft Patent 237 cm/s, triphasic.  06/07/18  RANGEL:  RIGHT 0.99  Triphasic.  LEFT 0.98  Triphasic.   12/18/18  RANGEL: Right 1.04 Fem triphasic Poptriphasic DP triphasic PT triphasic, Left 0.95 Fem  triphasic Poptriphasic DP triphasic PT triphasic  12/18/18  U/S Graft Survey: RT PTFE bypass patent mPSV 236 at proximal anastomosis        The following portions of the patient's history were reviewed and updated as appropriate: allergies, current medications, past family history, past medical history, past social history, past surgical history and problem list.  See HPI   Allergies   Allergen Reactions   • Lisinopril Angioedema       Current Outpatient Medications:   •  aspirin 81 MG tablet, Take 1 tablet by mouth Daily., Disp: 30 tablet, Rfl: 3  •  atorvastatin (LIPITOR) 80 MG tablet, Take 1 tablet by mouth Daily., Disp: 30 tablet, Rfl: 6  •  clopidogrel (PLAVIX) 75 MG tablet, Take 1 tablet by mouth Daily., Disp: 30 tablet, Rfl: 6  •  diltiaZEM CD (CARDIZEM CD) 300 MG 24 hr capsule, Take 1 capsule by mouth Daily., Disp: 30 capsule, Rfl: 3  •  hydrALAZINE (APRESOLINE) 50 MG tablet, Take 1 tablet by mouth 3 (Three) Times a Day., Disp: 90 tablet, Rfl: 2    Review of Systems   Constitution: Negative for chills, decreased appetite, weakness and malaise/fatigue.   HENT: Negative for hearing loss, hoarse voice, nosebleeds and stridor.    Eyes: Negative for visual disturbance.   Cardiovascular: Negative for chest pain, claudication, cyanosis, dyspnea on exertion, irregular heartbeat and leg swelling.        LE:  N Open sores  N color changes  N coldness.           N numbness or paresthesias   Respiratory: Positive for cough. Negative for hemoptysis and shortness of breath.    Hematologic/Lymphatic: Does not bruise/bleed easily.   Skin: Positive for dry skin. Negative for color change, flushing, itching, poor wound healing and rash.   Musculoskeletal: Positive for back pain. Negative for falls, muscle cramps and muscle weakness.        Spinal stenosis    Gastrointestinal: Negative for abdominal pain, anorexia, hematemesis and melena.   Genitourinary: Negative for hematuria.   Neurological: Negative for brief paralysis,  dizziness, focal weakness, numbness, paresthesias and sensory change.   Psychiatric/Behavioral: Negative for altered mental status.   Allergic/Immunologic: Negative for hives.        Objective   Physical Exam   Constitutional: He is oriented to person, place, and time. He appears well-nourished.   HENT:   Head: Normocephalic.   Mouth/Throat: Oropharynx is clear and moist.   Eyes: Conjunctivae are normal. Pupils are equal, round, and reactive to light.   Neck: Neck supple. No JVD present.   Cardiovascular: Normal rate, regular rhythm, normal heart sounds and intact distal pulses.   Pulses:       Carotid pulses are 1+ on the right side, and 1+ on the left side.       Radial pulses are 2+ on the right side, and 2+ on the left side.        Dorsalis pedis pulses are 2+ on the right side, and 2+ on the left side.        Posterior tibial pulses are 2+ on the right side, and 2+ on the left side.   LE:  W/d/p cap refil<3 sec no edema  Light pink post toes  No paleness  Intact mobility and sensation      Pulmonary/Chest: Effort normal and breath sounds normal. No stridor.   Abdominal: Soft. Bowel sounds are normal.   Musculoskeletal: He exhibits no edema or tenderness.       Neurological Sensory Findings -  Unaltered sharp/dull right ankle/foot discrimination and unaltered sharp/dull left ankle/foot discrimination.  Vascular Status -  His right foot exhibits abnormal foot vasculature . His right foot exhibits no edema. His left foot exhibits normal foot vasculature  and no edema.  Skin Integrity  -  His right foot skin is intact.His left foot skin is intact..  Neurological: He is alert and oriented to person, place, and time.   Skin: Skin is warm and dry. No rash noted. No erythema. No pallor.   RLE with spider varcosities    Psychiatric: His behavior is normal.   Nursing note and vitals reviewed.      Vitals:    12/18/18 1035   BP: 124/66   Pulse:    Temp:    SpO2:          Assessment/Plan    Detailed discussion regarding  risks, benefits, and treatment plan.  Patient understands, agrees, and wishes to proceed with plan.     Independent Review of Radiographic Studies:    12/18/18  RANGEL: Right 1.04 Fem triphasic Poptriphasic DP triphasic PT triphasic, Left 0.95 Fem triphasic Poptriphasic DP triphasic PT triphasic  12/18/18  U/S Graft Survey: RT PTFE bypass patent mPSV 236 at proximal anastomosis       Peripheral Vascular Disease  Results of todays RANGEL shows mild disease of the left leg and mild of the right.  Currently, patient does not complain of claudication, and can ambulate unhindered.  Would recommend repeat RANGEL in 6 months for surveillance of PVD post PTFE Bypass, Graft surveillance yearly.  Medical management includes STATIN, Anti-Platelet, and VIT D if known deficiency.  We have discussed the benefit of improving conditioning as a means to improve walking distance and claudication symptoms.  Lifestyle changes as below.     Tobacco Dependence Syndrome  Smoking cessation assistance options offered including behavioral counseling (Smoking Cessation Classes), Nicotine replacement therapy (patches or gum), pharmacologic therapy (Chantix, Wellbutrin). Understands tobacco increases risk of expanding AAA, MI, CVA, PAD, carcinoma. Discussion and question answer period 5-7 minutes

## 2018-12-18 NOTE — PATIENT INSTRUCTIONS
The results of your vascular studies of your right leg shows mild disease with good  circulation, in the left leg shows mild disease with good circulation.  U/S of graft shows that it is open and flowing well.  Continue with aspirin, lipitor, and plavix.  Follow up in 6 months unless If signs and symptoms of ischemia should occur including but not limited to pale/blue discoloration of limb, increasing pain with ambulation or at rest, or a non-healing wound. Patient is to notify [facility] for immediate evaluation.

## 2019-01-30 DIAGNOSIS — I10 ESSENTIAL HYPERTENSION: ICD-10-CM

## 2019-01-30 RX ORDER — HYDRALAZINE HYDROCHLORIDE 50 MG/1
TABLET, FILM COATED ORAL
Qty: 90 TABLET | Refills: 0 | Status: SHIPPED | OUTPATIENT
Start: 2019-01-30 | End: 2019-03-01 | Stop reason: SDUPTHER

## 2019-03-01 DIAGNOSIS — I10 ESSENTIAL HYPERTENSION: ICD-10-CM

## 2019-03-01 RX ORDER — HYDRALAZINE HYDROCHLORIDE 50 MG/1
50 TABLET, FILM COATED ORAL 3 TIMES DAILY
Qty: 90 TABLET | Refills: 0 | Status: SHIPPED | OUTPATIENT
Start: 2019-03-01 | End: 2019-03-27 | Stop reason: SDUPTHER

## 2019-03-01 RX ORDER — DILTIAZEM HYDROCHLORIDE 300 MG/1
300 CAPSULE, COATED, EXTENDED RELEASE ORAL DAILY
Qty: 30 CAPSULE | Refills: 3 | Status: SHIPPED | OUTPATIENT
Start: 2019-03-01 | End: 2019-03-27 | Stop reason: SDUPTHER

## 2019-03-01 NOTE — TELEPHONE ENCOUNTER
Patient called to get a refill on medication.    diltiaZEM CD (CARDIZEM CD) 300 MG 24 hr capsule    hydrALAZINE (APRESOLINE) 50 MG tablet    [facility]

## 2019-03-27 ENCOUNTER — OFFICE VISIT (OUTPATIENT)
Dept: FAMILY MEDICINE CLINIC | Facility: CLINIC | Age: 69
End: 2019-03-27

## 2019-03-27 VITALS
HEART RATE: 85 BPM | WEIGHT: 144.5 LBS | SYSTOLIC BLOOD PRESSURE: 142 MMHG | DIASTOLIC BLOOD PRESSURE: 70 MMHG | BODY MASS INDEX: 19.57 KG/M2 | OXYGEN SATURATION: 98 % | HEIGHT: 72 IN

## 2019-03-27 DIAGNOSIS — I10 ESSENTIAL HYPERTENSION: ICD-10-CM

## 2019-03-27 DIAGNOSIS — I73.9 PERIPHERAL VASCULAR DISEASE (HCC): ICD-10-CM

## 2019-03-27 PROCEDURE — 99213 OFFICE O/P EST LOW 20 MIN: CPT | Performed by: FAMILY MEDICINE

## 2019-03-27 RX ORDER — CLOPIDOGREL BISULFATE 75 MG/1
75 TABLET ORAL DAILY
Qty: 30 TABLET | Refills: 6 | Status: SHIPPED | OUTPATIENT
Start: 2019-03-27 | End: 2019-10-18 | Stop reason: SDUPTHER

## 2019-03-27 RX ORDER — DILTIAZEM HYDROCHLORIDE 300 MG/1
300 CAPSULE, COATED, EXTENDED RELEASE ORAL DAILY
Qty: 30 CAPSULE | Refills: 6 | Status: SHIPPED | OUTPATIENT
Start: 2019-03-27 | End: 2019-10-18 | Stop reason: SDUPTHER

## 2019-03-27 RX ORDER — HYDRALAZINE HYDROCHLORIDE 50 MG/1
50 TABLET, FILM COATED ORAL 2 TIMES DAILY
Qty: 60 TABLET | Refills: 6 | Status: SHIPPED | OUTPATIENT
Start: 2019-03-27 | End: 2019-10-18 | Stop reason: SDUPTHER

## 2019-03-27 RX ORDER — ATORVASTATIN CALCIUM 80 MG/1
80 TABLET, FILM COATED ORAL DAILY
Qty: 30 TABLET | Refills: 6 | Status: SHIPPED | OUTPATIENT
Start: 2019-03-27 | End: 2019-10-18 | Stop reason: SDUPTHER

## 2019-07-16 ENCOUNTER — OFFICE VISIT (OUTPATIENT)
Dept: CARDIAC SURGERY | Facility: CLINIC | Age: 69
End: 2019-07-16

## 2019-07-16 VITALS
HEART RATE: 79 BPM | SYSTOLIC BLOOD PRESSURE: 132 MMHG | DIASTOLIC BLOOD PRESSURE: 66 MMHG | BODY MASS INDEX: 19.29 KG/M2 | WEIGHT: 142.4 LBS | HEIGHT: 72 IN | OXYGEN SATURATION: 98 %

## 2019-07-16 DIAGNOSIS — I70.211 ATHEROSCLEROSIS OF NATIVE ARTERY OF RIGHT LOWER EXTREMITY WITH INTERMITTENT CLAUDICATION (HCC): ICD-10-CM

## 2019-07-16 DIAGNOSIS — F17.218 NICOTINE DEPENDENCE, CIGARETTES, WITH OTHER NICOTINE-INDUCED DISORDERS: ICD-10-CM

## 2019-07-16 DIAGNOSIS — Z12.2 ENCOUNTER FOR SCREENING FOR LUNG CANCER: ICD-10-CM

## 2019-07-16 DIAGNOSIS — R09.89 RIGHT CAROTID BRUIT: ICD-10-CM

## 2019-07-16 DIAGNOSIS — I73.9 PERIPHERAL VASCULAR DISEASE (HCC): Primary | ICD-10-CM

## 2019-07-16 PROCEDURE — 99214 OFFICE O/P EST MOD 30 MIN: CPT | Performed by: NURSE PRACTITIONER

## 2019-07-16 NOTE — PATIENT INSTRUCTIONS
The results of your vascular studies of your right leg shows mild disease with good  circulation, in the left leg shows milddisease with good circulation.  If signs and symptoms of ischemia should occur including but not limited to pale/blue discoloration of limb, increasing pain with ambulation or at rest, or a non-healing wound. Patient is to notify [facility] center for immediate evaluation.

## 2019-07-18 NOTE — PROGRESS NOTES
Patient ID: Dwight Ryder is a 68 y.o. male is here today for follow-up PVD.       Chief Complaint   Patient presents with   • Peripheral Vascular Disease       6 mo f/u     PCP: Dr. Hussein     History of Present Illness  Mr. Ryder is a pleasant 68-year-old male with a history of peripheral vascular disease, hyperlipidemia, hypertension, vasculogenic erectile dysfunction, 50-pack-year smoking history.  Patient established with cardiothoracic and vascular surgery in 2012 with right lifestyle limiting claudication, subsequent underwent right SFA PTA and stent.  Claudication worsening in 2013 requiring right femoral to popliteal bypass with PTFE.  Right femoropopliteal occluded in December 2014 and patient subsequently underwent right femoropopliteal thrombolysis with EKOS, restudy with good distal perfusion.  He has remained in compliant follow-up in surveillance no claudication at today's visit he remains on aspirin, statin, Plavix for management of vascular disease.      2012 RIGHT SFA stent  2013 RIGHT femoral to popliteal artery bypass (PTFE)  12/15/2014 RIGHT lower extremity angiogram:  RIGHT fem-pop thrombolysis, EKOS.  12/16/2014 RIGHT lower extremity angiogram:  Patient was taken to the angio suite and placed in supine position. Right lower extremity arteriogram was performed through the 6-[place] sheath demonstrating a widely patent external iliac artery, common femoral artery, and profunda femoris artery. SFA is occluded. Fem/pop graft is widely patent with no significant flow-limiting stenosis. Mild irregularity at the distal anastomosis. No flow-limiting stenosis. Popliteal artery appears normal. Anterior tibial artery is patent to the foot. Posterior tibial artery is patent to the foot. Peroneal artery is not well seen. Plantar arch is not intact. Good flow to the digital arteries. No intervention  indicated. Good pulse in the foot at the completion of the case.  11/22/2016 RANGEL:  RIGHT 1.1 triphasic.   LEFT 1.1 triphasic.  Graft 169cm/s, triphasic.  5/22/2017 RANGEL:  RIGHT 1.1 triphasic.  LEFT 1.0 triphasic.  Graft 186cm/s, triphasic.  12/06/17   RANGEL:  RIGHT 1.12  Triphasic.  LEFT 0.99  Triphasic.  Graft Patent 237 cm/s, triphasic.  06/07/18  RANGEL:  RIGHT 0.99  Triphasic.  LEFT 0.98  Triphasic.   12/18/18  RANGEL: Right 1.04 Fem triphasic Poptriphasic DP triphasic PT triphasic, Left 0.95 Fem triphasic Poptriphasic DP triphasic PT triphasic  12/18/18  U/S Graft Survey: RT PTFE bypass patent mPSV 236 at proximal anastomosis   7/16/19 RANGEL: Right 1.07 triphasic.  Left 0.99 triphasic.       The following portions of the patient's history were reviewed and updated as appropriate: allergies, current medications, past family history, past medical history, past social history, past surgical history and problem list.  See HPI   Allergies   Allergen Reactions   • Lisinopril Angioedema       Current Outpatient Medications:   •  aspirin 81 MG tablet, Take 1 tablet by mouth Daily., Disp: 30 tablet, Rfl: 3  •  atorvastatin (LIPITOR) 80 MG tablet, Take 1 tablet by mouth Daily., Disp: 30 tablet, Rfl: 6  •  clopidogrel (PLAVIX) 75 MG tablet, Take 1 tablet by mouth Daily., Disp: 30 tablet, Rfl: 6  •  diltiaZEM CD (CARDIZEM CD) 300 MG 24 hr capsule, Take 1 capsule by mouth Daily., Disp: 30 capsule, Rfl: 6  •  hydrALAZINE (APRESOLINE) 50 MG tablet, Take 1 tablet by mouth 2 (Two) Times a Day., Disp: 60 tablet, Rfl: 6    Review of Systems   Constitution: Negative for chills, decreased appetite, weakness and malaise/fatigue.   HENT: Negative for hearing loss, hoarse voice, nosebleeds and stridor.    Eyes: Negative for visual disturbance.   Cardiovascular: Negative for chest pain, claudication, cyanosis, dyspnea on exertion, irregular heartbeat and leg swelling.        LE:  N Open sores  N color changes  N coldness.           N numbness or paresthesias   Respiratory: Positive for cough. Negative for hemoptysis and shortness of breath.     Hematologic/Lymphatic: Does not bruise/bleed easily.   Skin: Positive for dry skin. Negative for color change, flushing, itching, poor wound healing and rash.   Musculoskeletal: Positive for back pain. Negative for falls, muscle cramps and muscle weakness.        Spinal stenosis    Gastrointestinal: Negative for abdominal pain, anorexia, hematemesis and melena.   Genitourinary: Negative for hematuria.   Neurological: Negative for brief paralysis, dizziness, focal weakness, numbness, paresthesias and sensory change.   Psychiatric/Behavioral: Negative for altered mental status.   Allergic/Immunologic: Negative for hives.        Objective   Physical Exam   Constitutional: He is oriented to person, place, and time. He appears well-nourished.   HENT:   Head: Normocephalic.   Mouth/Throat: Oropharynx is clear and moist.   Eyes: Conjunctivae are normal. Pupils are equal, round, and reactive to light.   Neck: Neck supple. No JVD present.   Cardiovascular: Normal rate, regular rhythm, normal heart sounds and intact distal pulses.   Pulses:       Carotid pulses are 1+ on the right side, and 1+ on the left side.       Radial pulses are 2+ on the right side, and 2+ on the left side.        Dorsalis pedis pulses are 2+ on the right side, and 2+ on the left side.        Posterior tibial pulses are 2+ on the right side, and 2+ on the left side.   LE:  W/d/p cap refil<3 sec no edema  Light pink post toes  No paleness  Intact mobility and sensation      Pulmonary/Chest: Effort normal and breath sounds normal. No stridor.   Abdominal: Soft. Bowel sounds are normal.   Musculoskeletal: He exhibits no edema or tenderness.       Neurological Sensory Findings -  Unaltered sharp/dull right ankle/foot discrimination and unaltered sharp/dull left ankle/foot discrimination.  Vascular Status -  His right foot exhibits abnormal foot vasculature . His right foot exhibits no edema. His left foot exhibits normal foot vasculature  and no  edema.  Skin Integrity  -  His right foot skin is intact.His left foot skin is intact..  Neurological: He is alert and oriented to person, place, and time.   Skin: Skin is warm and dry. No rash noted. No erythema. No pallor.   RLE with spider varcosities    Psychiatric: His behavior is normal.   Nursing note and vitals reviewed.      Vitals:    07/16/19 1055   BP: 132/66   Pulse: 79   SpO2: 98%         Assessment/Plan    Detailed discussion regarding risks, benefits, and treatment plan.  Patient understands, agrees, and wishes to proceed with plan.     Independent Review of Radiographic Studies:    7/16/19 RANGEL: Right 1.07 triphasic.  Left 0.99 triphasic.    Peripheral Vascular Disease  Results of todays RANGEL shows mild disease of the left leg and mild of the right.  Currently, patient does not complain of claudication, and can ambulate unhindered.  Would recommend repeat RANGEL in 6 months for surveillance of PVD post PTFE Bypass, Graft surveillance yearly.  Medical management includes STATIN, Anti-Platelet, and VIT D if known deficiency.  We have discussed the benefit of improving conditioning as a means to improve walking distance and claudication symptoms.  Lifestyle changes as below.     Carotid bruit  Carotid ultrasound surveillance at next visit    Tobacco Dependence Syndrome  Smoking cessation assistance options offered including behavioral counseling (Smoking Cessation Classes), Nicotine replacement therapy (patches or gum), pharmacologic therapy (Chantix, Wellbutrin). Understands tobacco increases risk of expanding AAA, MI, CVA, PAD, carcinoma. Discussion and question answer period 5-7 minutes.  Does not wish to quit at this time    Lung Cancer Screening  >30PY smoking history.  Meets criteria for LDCT, benefits include early identification of lung nodule and/or lung cancer leading to early intervention and prolonged lifespan.  Patient understands and wishes to decline in annual surveillance for lung cancer.

## 2019-10-18 ENCOUNTER — OFFICE VISIT (OUTPATIENT)
Dept: FAMILY MEDICINE CLINIC | Facility: CLINIC | Age: 69
End: 2019-10-18

## 2019-10-18 VITALS
DIASTOLIC BLOOD PRESSURE: 60 MMHG | HEIGHT: 72 IN | OXYGEN SATURATION: 99 % | WEIGHT: 145.3 LBS | SYSTOLIC BLOOD PRESSURE: 130 MMHG | TEMPERATURE: 96.8 F | BODY MASS INDEX: 19.68 KG/M2 | HEART RATE: 82 BPM

## 2019-10-18 DIAGNOSIS — I10 ESSENTIAL HYPERTENSION: ICD-10-CM

## 2019-10-18 DIAGNOSIS — I73.9 PERIPHERAL VASCULAR DISEASE (HCC): ICD-10-CM

## 2019-10-18 PROCEDURE — 99213 OFFICE O/P EST LOW 20 MIN: CPT | Performed by: STUDENT IN AN ORGANIZED HEALTH CARE EDUCATION/TRAINING PROGRAM

## 2019-10-18 RX ORDER — HYDRALAZINE HYDROCHLORIDE 50 MG/1
50 TABLET, FILM COATED ORAL 2 TIMES DAILY
Qty: 60 TABLET | Refills: 6 | Status: SHIPPED | OUTPATIENT
Start: 2019-10-18 | End: 2020-05-01 | Stop reason: SDUPTHER

## 2019-10-18 RX ORDER — ATORVASTATIN CALCIUM 80 MG/1
80 TABLET, FILM COATED ORAL DAILY
Qty: 30 TABLET | Refills: 6 | Status: SHIPPED | OUTPATIENT
Start: 2019-10-18 | End: 2020-05-01 | Stop reason: SDUPTHER

## 2019-10-18 RX ORDER — DILTIAZEM HYDROCHLORIDE 300 MG/1
300 CAPSULE, COATED, EXTENDED RELEASE ORAL DAILY
Qty: 30 CAPSULE | Refills: 6 | Status: SHIPPED | OUTPATIENT
Start: 2019-10-18 | End: 2020-05-01 | Stop reason: SDUPTHER

## 2019-10-18 RX ORDER — CLOPIDOGREL BISULFATE 75 MG/1
75 TABLET ORAL DAILY
Qty: 30 TABLET | Refills: 6 | Status: SHIPPED | OUTPATIENT
Start: 2019-10-18 | End: 2020-05-01 | Stop reason: SDUPTHER

## 2019-10-18 NOTE — PROGRESS NOTES
Family Medicine Residency   Amparo Alcantar MD    Dwight Ryder is a 68 y.o. male who presents to family medicine residency clinic for the following:    Subjective:     He is here today for follow up for HLD and HTN. BP today is 130/80. Asymptomatic. No headaches, chest pain, vision changes. Tolerating the medications. Does not check BP at home regularly.  Takes Lipitor and Plavix for PVD. He sees cardiothoracic surgery.     Past Medical Hx:   Past Medical History:   Diagnosis Date   • Artificial lens present     right   • Astigmatism     myopic   • Atherosclerosis of native arteries of extremity with intermittent claudication (CMS/HCC)    • Benign essential hypertension    • Benign prostatic hyperplasia    • Cataract     R>L   • Corneal foreign body     Left eye, 2 years ago      • Essential hypertension    • Examination     individual health   • Exanthematous disorder    • Fracture of foot    • Hypercholesterolemia    • Hyperkalemia    • Hyperlipidemia    • Hypertensive disorder    • Male erectile disorder    • Need for vaccination    • Peripheral vascular disease (CMS/HCC)     Post RIGHT fem-pop bypass graft 1/7/13 Post thrombolysis to graft 12/15/14      • Peripheral vascular disease (CMS/HCC)     unspecified   • Posterior subcapsular polar senile cataract    • Surgical follow-up care     R fem->pop bypass 1/7/13      • Tobacco dependence syndrome    • Tobacco user    • Urticaria     will give kenalog and benadyl , will order allery test      • Visual discomfort        Past Surgical Hx:  Past Surgical History:   Procedure Laterality Date   • ANGIOPLASTY      femoral-popliteal artery (dilation) (Abdominal aortogram, bilateral lower extremity runoff. Repair of left common femoral artery.)   11/26/2012    • FEMORAL POPLITEAL BYPASS      Right with 6 mm [place]-baron graft)   01/07/2013    • INJECTION OF MEDICATION  03/04/2013    kenalog(3)    • OTHER SURGICAL HISTORY      Remove cataract, insert lens  (Cataract extraction with intraocular lens implantation, right eye.)   11/26/2013        Current Meds:    Current Outpatient Medications:   •  aspirin 81 MG tablet, Take 1 tablet by mouth Daily., Disp: 30 tablet, Rfl: 3  •  atorvastatin (LIPITOR) 80 MG tablet, Take 1 tablet by mouth Daily., Disp: 30 tablet, Rfl: 6  •  clopidogrel (PLAVIX) 75 MG tablet, Take 1 tablet by mouth Daily., Disp: 30 tablet, Rfl: 6  •  dilTIAZem CD (CARDIZEM CD) 300 MG 24 hr capsule, Take 1 capsule by mouth Daily., Disp: 30 capsule, Rfl: 6  •  hydrALAZINE (APRESOLINE) 50 MG tablet, Take 1 tablet by mouth 2 (Two) Times a Day., Disp: 60 tablet, Rfl: 6    Allergies:  Lisinopril    Family Hx:  Family History   Problem Relation Age of Onset   • Cancer Other    • Heart disease Other    • Hypertension Other    • Stroke Other         Social History:  Social History     Socioeconomic History   • Marital status:      Spouse name: Not on file   • Number of children: Not on file   • Years of education: Not on file   • Highest education level: Not on file   Tobacco Use   • Smoking status: Current Every Day Smoker     Packs/day: 1.00     Years: 30.00     Pack years: 30.00     Types: Cigarettes   • Smokeless tobacco: Never Used   • Tobacco comment: 1-1.5 PPD FOR 30 YEARS   Substance and Sexual Activity   • Alcohol use: Yes     Comment: 36 to 48 oz a day   • Drug use: No   • Sexual activity: Defer       Review of Systems  Review of Systems   Constitutional: Negative for chills, diaphoresis and fever.   HENT: Negative for sneezing and sore throat.    Eyes: Negative for pain and discharge.   Respiratory: Negative for cough and shortness of breath.    Gastrointestinal: Negative for constipation, diarrhea, nausea and vomiting.   Endocrine: Negative for cold intolerance and heat intolerance.   Genitourinary: Negative for difficulty urinating, dysuria, frequency and urgency.   Musculoskeletal: Negative for arthralgias and myalgias.   Skin: Negative for  color change and pallor.   Allergic/Immunologic: Negative for environmental allergies and food allergies.   Neurological: Negative for dizziness, syncope and weakness.   Psychiatric/Behavioral: Negative for confusion and sleep disturbance.       Physical Exam:  Vitals:    10/18/19 1103   BP: 130/60   Pulse: 82   Temp: 96.8 °F (36 °C)   SpO2: 99%       Body mass index is 19.71 kg/m².   Physical Exam   Constitutional: He is oriented to person, place, and time. He appears well-developed and well-nourished. No distress.   HENT:   Head: Normocephalic and atraumatic.   Nose: Nose normal.   Eyes: Conjunctivae and EOM are normal.   Neck: Normal range of motion. Neck supple.   Cardiovascular: Normal rate, regular rhythm and normal heart sounds.   No murmur heard.  Pulmonary/Chest: Effort normal and breath sounds normal. No respiratory distress. He has no wheezes. He has no rales.   Abdominal: Soft. Bowel sounds are normal. He exhibits no distension. There is no tenderness. There is no guarding.   Musculoskeletal: Normal range of motion.   Neurological: He is alert and oriented to person, place, and time.   Skin: Skin is warm and dry.   Psychiatric: He has a normal mood and affect. His behavior is normal.   Vitals reviewed.      Objective:     Data Reviewed:     LABS:   Lab Results   Component Value Date    GLUCOSE 78 01/09/2018    BUN 12 01/09/2018    CREATININE 1.10 01/09/2018    EGFRIFAFRI 81 01/09/2018    BCR 10.9 01/09/2018    K 4.0 01/09/2018    CO2 23.0 01/09/2018    CALCIUM 9.2 01/09/2018    ALBUMIN 4.40 07/11/2017    AST 56 07/11/2017    ALT 34 07/11/2017     Lab Results   Component Value Date    WBC 5.3 01/12/2017    HGB 12.0 (L) 01/12/2017    HCT 35.1 (L) 01/12/2017    MCV 85.0 01/12/2017     01/12/2017     Lab Results   Component Value Date    CHOL 137 01/09/2018    CHLPL 149 09/13/2016    TRIG 60 01/09/2018    HDL 62 01/09/2018    LDL 47 01/09/2018     Lab Results   Component Value Date    TSH 1.05  09/13/2016     Lab Results   Component Value Date    HGBA1C 5.4 09/13/2016     Lab Results   Component Value Date    CREATININE 1.10 01/09/2018     No results found for: IRON, TIBC, FERRITIN      Health Maintenance:   Weight  -Class: Normal: 18.5-24.9kg/m2  -Patient's Body mass index is 19.71 kg/m². BMI is within normal parameters. No follow-up required..      Alcohol use:  reports that he drinks alcohol.    Nicotine status  reports that he has been smoking cigarettes.  He has a 30.00 pack-year smoking history. He has never used smokeless tobacco.        Health Maintenance  Health Maintenance   Topic Date Due   • MEDICARE ANNUAL WELLNESS  08/31/2016   • PNEUMOCOCCAL VACCINES (65+ LOW/MEDIUM RISK) (1 of 2 - PCV13) 03/20/2020 (Originally 10/23/2017)   • ZOSTER VACCINE (2 of 2) 03/20/2020 (Originally 11/9/2016)   • LUNG CANCER SCREENING  04/08/2020 (Originally 10/23/2007)   • COLONOSCOPY  09/14/2026   • TDAP/TD VACCINES (2 - Td) 09/14/2026   • HEPATITIS C SCREENING  Completed   • AAA SCREEN (ONE-TIME)  Completed   • INFLUENZA VACCINE  Addressed   • LIPID PANEL  Discontinued        Goals:   Goals        Patient Stated    • cut back on smoking to 1/2 ppd (pt-stated)      Barrier:  Dependence              Assessment/Plan:       Assessment/Plan   Dwight was seen today for hyperlipidemia and hypertension.    Diagnoses and all orders for this visit:    Peripheral vascular disease (CMS/HCC)  -     atorvastatin (LIPITOR) 80 MG tablet; Take 1 tablet by mouth Daily.  -     clopidogrel (PLAVIX) 75 MG tablet; Take 1 tablet by mouth Daily.    Essential hypertension  -     dilTIAZem CD (CARDIZEM CD) 300 MG 24 hr capsule; Take 1 capsule by mouth Daily.  -     hydrALAZINE (APRESOLINE) 50 MG tablet; Take 1 tablet by mouth 2 (Two) Times a Day.      He refuses lung cancer screening today.  Continue meds for HTN as he is well controlled.  Continue Lipitor for HLD.  Continue to see CT surgery for PVD management.  Follow up in 6 months or  sooner if needed.      FOLLOW-UP  Return in about 6 months (around 4/18/2020) for Recheck.      RISK SCORE: 4        This document has been electronically signed by Amparo Alcantar MD on October 18, 2019 1:44 PM

## 2019-10-22 NOTE — PROGRESS NOTES
I have reviewed the patient.  I have reviewed the notes, assessments, and/or procedures performed by Dr Amparo Alcantar, I concur with her  documentation and assessment and plan for Dwight Ryder.          This document has been electronically signed by Gilbert Smith MD on October 22, 2019 3:09 PM

## 2019-11-14 DIAGNOSIS — I73.9 PERIPHERAL VASCULAR DISEASE (HCC): ICD-10-CM

## 2019-11-14 RX ORDER — CLOPIDOGREL BISULFATE 75 MG/1
TABLET ORAL
Qty: 30 TABLET | Refills: 0 | OUTPATIENT
Start: 2019-11-14

## 2020-05-01 DIAGNOSIS — I10 ESSENTIAL HYPERTENSION: ICD-10-CM

## 2020-05-01 DIAGNOSIS — I73.9 PERIPHERAL VASCULAR DISEASE (HCC): ICD-10-CM

## 2020-05-01 RX ORDER — HYDRALAZINE HYDROCHLORIDE 50 MG/1
50 TABLET, FILM COATED ORAL 2 TIMES DAILY
Qty: 60 TABLET | Refills: 1 | Status: SHIPPED | OUTPATIENT
Start: 2020-05-01 | End: 2020-07-07 | Stop reason: SDUPTHER

## 2020-05-01 RX ORDER — CLOPIDOGREL BISULFATE 75 MG/1
75 TABLET ORAL DAILY
Qty: 30 TABLET | Refills: 1 | Status: SHIPPED | OUTPATIENT
Start: 2020-05-01 | End: 2020-07-07 | Stop reason: SDUPTHER

## 2020-05-01 RX ORDER — ATORVASTATIN CALCIUM 80 MG/1
80 TABLET, FILM COATED ORAL DAILY
Qty: 30 TABLET | Refills: 1 | Status: SHIPPED | OUTPATIENT
Start: 2020-05-01 | End: 2020-07-07 | Stop reason: SDUPTHER

## 2020-05-01 RX ORDER — DILTIAZEM HYDROCHLORIDE 300 MG/1
300 CAPSULE, COATED, EXTENDED RELEASE ORAL DAILY
Qty: 30 CAPSULE | Refills: 1 | Status: SHIPPED | OUTPATIENT
Start: 2020-05-01 | End: 2020-07-07 | Stop reason: SDUPTHER

## 2020-07-07 DIAGNOSIS — I73.9 PERIPHERAL VASCULAR DISEASE (HCC): ICD-10-CM

## 2020-07-07 DIAGNOSIS — I10 ESSENTIAL HYPERTENSION: ICD-10-CM

## 2020-07-08 RX ORDER — ATORVASTATIN CALCIUM 80 MG/1
80 TABLET, FILM COATED ORAL DAILY
Qty: 30 TABLET | Refills: 1 | Status: SHIPPED | OUTPATIENT
Start: 2020-07-08 | End: 2020-09-24 | Stop reason: SDUPTHER

## 2020-07-08 RX ORDER — CLOPIDOGREL BISULFATE 75 MG/1
75 TABLET ORAL DAILY
Qty: 30 TABLET | Refills: 1 | Status: SHIPPED | OUTPATIENT
Start: 2020-07-08 | End: 2020-09-24 | Stop reason: SDUPTHER

## 2020-07-08 RX ORDER — DILTIAZEM HYDROCHLORIDE 300 MG/1
300 CAPSULE, COATED, EXTENDED RELEASE ORAL DAILY
Qty: 30 CAPSULE | Refills: 1 | Status: SHIPPED | OUTPATIENT
Start: 2020-07-08 | End: 2020-09-24 | Stop reason: SDUPTHER

## 2020-07-08 RX ORDER — HYDRALAZINE HYDROCHLORIDE 50 MG/1
50 TABLET, FILM COATED ORAL 2 TIMES DAILY
Qty: 60 TABLET | Refills: 1 | Status: SHIPPED | OUTPATIENT
Start: 2020-07-08 | End: 2020-09-24 | Stop reason: SDUPTHER

## 2020-07-08 RX ORDER — ASPIRIN 81 MG/1
81 TABLET ORAL DAILY
Qty: 30 TABLET | Refills: 1 | Status: SHIPPED | OUTPATIENT
Start: 2020-07-08 | End: 2020-09-24 | Stop reason: SDUPTHER

## 2020-07-15 ENCOUNTER — OFFICE VISIT (OUTPATIENT)
Dept: FAMILY MEDICINE CLINIC | Facility: CLINIC | Age: 70
End: 2020-07-15

## 2020-07-15 VITALS
WEIGHT: 143 LBS | HEIGHT: 72 IN | BODY MASS INDEX: 19.37 KG/M2 | DIASTOLIC BLOOD PRESSURE: 62 MMHG | OXYGEN SATURATION: 99 % | HEART RATE: 71 BPM | TEMPERATURE: 97 F | SYSTOLIC BLOOD PRESSURE: 140 MMHG

## 2020-07-15 DIAGNOSIS — I10 ESSENTIAL HYPERTENSION: Primary | ICD-10-CM

## 2020-07-15 DIAGNOSIS — Z72.0 TOBACCO USE: ICD-10-CM

## 2020-07-15 PROCEDURE — 99213 OFFICE O/P EST LOW 20 MIN: CPT | Performed by: STUDENT IN AN ORGANIZED HEALTH CARE EDUCATION/TRAINING PROGRAM

## 2020-07-21 ENCOUNTER — OFFICE VISIT (OUTPATIENT)
Dept: CARDIAC SURGERY | Facility: CLINIC | Age: 70
End: 2020-07-21

## 2020-07-21 VITALS
HEIGHT: 72 IN | SYSTOLIC BLOOD PRESSURE: 150 MMHG | OXYGEN SATURATION: 98 % | BODY MASS INDEX: 19.1 KG/M2 | WEIGHT: 141 LBS | DIASTOLIC BLOOD PRESSURE: 60 MMHG | HEART RATE: 70 BPM

## 2020-07-21 DIAGNOSIS — R09.89 RIGHT CAROTID BRUIT: ICD-10-CM

## 2020-07-21 DIAGNOSIS — I65.23 CAROTID STENOSIS, ASYMPTOMATIC, BILATERAL: ICD-10-CM

## 2020-07-21 DIAGNOSIS — F17.218 NICOTINE DEPENDENCE, CIGARETTES, WITH OTHER NICOTINE-INDUCED DISORDERS: ICD-10-CM

## 2020-07-21 DIAGNOSIS — E78.2 MIXED HYPERLIPIDEMIA: ICD-10-CM

## 2020-07-21 DIAGNOSIS — I73.9 PERIPHERAL VASCULAR DISEASE (HCC): Primary | ICD-10-CM

## 2020-07-21 PROCEDURE — 99214 OFFICE O/P EST MOD 30 MIN: CPT | Performed by: NURSE PRACTITIONER

## 2020-07-21 NOTE — PATIENT INSTRUCTIONS
The results of your vascular studies of your right leg shows mild disease with good  circulation, in the left leg shows milddisease with good circulation.      If signs and symptoms of ischemia should occur including but not limited to pale/blue discoloration of limb, increasing pain with ambulation or at rest, or a non-healing wound. Patient is to notify [facility] center for immediate evaluation.    The results of your carotid ultrasound shows moderate disease of the right carotid and is stable from previous exam, ultrasound of the left carotid shows moderate disease and is stable from previous exam.    Follow up in 6 months    If you should experience any neurological symptoms including but not limited to visual or speech disturbances confusion, seizures, or weakness of limbs of one side of your body notify [facility] V[facility] center immediately for evaluation or if after hours present to the nearest Emergency Department.

## 2020-07-23 NOTE — PROGRESS NOTES
CVTS Office Progress Note     7/23/2020    Dwight Ryder  1950    Chief Complaint:    Chief Complaint   Patient presents with   • Follow-up   • Peripheral Vascular Disease   • Carotid Artery Disease       HPI:      PCP:  Stephanie Mai MD    69 y.o. male with HTN(stable, increased risk stroke, rupture), Hyperlipidemia(stable, increased risk cardiovascular events) and Smoker(uncontrolled, increased risk cardiovascular events) BPH, PVD (stable, increased risk cardiovascular events).  smokes 1 PPD.  Established with CTVS in 2013 with lifestyle limiting claudication, prior R SFA stent occluded.  Underwent bypass which occluded the following year requiring lysis.  Unfortunately he continues to smoke.  No complaints of claudication at today's visit.  No other associated signs, symptoms or modifying factors.    2012 RIGHT SFA stent  2013 RIGHT femoral to popliteal artery bypass (PTFE)  12/15/2014 RIGHT lower extremity angiogram:  RIGHT fem-pop thrombolysis, EKOS.  12/2020 Restudy: Graft patent  11/22/2016 RANGEL:  RIGHT 1.1 triphasic.  LEFT 1.1 triphasic.  Graft 169cm/s, triphasic.  5/22/2017 RANGEL:  RIGHT 1.1 triphasic.  LEFT 1.0 triphasic.  Graft 186cm/s, triphasic.  12/06/17   RANGEL:  RIGHT 1.12  Triphasic.  LEFT 0.99  Triphasic.  Graft Patent 237 cm/s, triphasic.  06/07/18  RANGEL:  RIGHT 0.99  Triphasic.  LEFT 0.98  Triphasic.   12/18/18  RANGEL: Right 1.04 Fem triphasic Poptriphasic DP triphasic PT triphasic, Left 0.95 Fem triphasic Poptriphasic DP triphasic PT triphasic  12/18/18  U/S Graft Survey: RT PTFE bypass patent mPSV 236 at proximal anastomosis   7/16/19 RANGEL: Right 1.07 triphasic.  Left 0.99 triphasic.  7/2020 RANGEL: Right 1.0 triphasic.  Left 1.05 triphasic.   7/2020 Right Graft US: Patent, [id number] proximal anastamosis    7/2020 Carotid Duplex: GELACIO 50-69% mPSV 133c/s Ratio 1.3, LICA 50-69% mPSV 139c/s Ratio 0.8, Antegrade vertebrals    The following portions of the patient's history were reviewed and  updated as appropriate: allergies, current medications, past family history, past medical history, past social history, past surgical history and problem list.  Recent images independently reviewed.  Available laboratory values reviewed.    PMH:  Past Medical History:   Diagnosis Date   • Artificial lens present     right   • Astigmatism     myopic   • Atherosclerosis of native arteries of extremity with intermittent claudication (CMS/HCC)    • Benign essential hypertension    • Benign prostatic hyperplasia    • Cataract     R>L   • Corneal foreign body     Left eye, 2 years ago      • Essential hypertension    • Examination     individual health   • Exanthematous disorder    • Fracture of foot    • Hypercholesterolemia    • Hyperkalemia    • Hyperlipidemia    • Hypertensive disorder    • Male erectile disorder    • Need for vaccination    • Peripheral vascular disease (CMS/HCC)     Post RIGHT fem-pop bypass graft 1/7/13 Post thrombolysis to graft 12/15/14      • Peripheral vascular disease (CMS/HCC)     unspecified   • Posterior subcapsular polar senile cataract    • Surgical follow-up care     R fem->pop bypass 1/7/13      • Tobacco dependence syndrome    • Tobacco user    • Urticaria     will give kenalog and benadyl , will order allery test      • Visual discomfort      Past Surgical History:   Procedure Laterality Date   • ANGIOPLASTY      femoral-popliteal artery (dilation) (Abdominal aortogram, bilateral lower extremity runoff. Repair of left common femoral artery.)   11/26/2012    • FEMORAL POPLITEAL BYPASS      Right with 6 mm [place]-baron graft)   01/07/2013    • INJECTION OF MEDICATION  03/04/2013    kenalog(3)    • OTHER SURGICAL HISTORY      Remove cataract, insert lens (Cataract extraction with intraocular lens implantation, right eye.)   11/26/2013      Family History   Problem Relation Age of Onset   • Cancer Other    • Heart disease Other    • Hypertension Other    • Stroke Other      Social History          Tobacco Use   • Smoking status: Current Every Day Smoker     Packs/day: 1.00     Years: 30.00     Pack years: 30.00     Types: Cigarettes   • Smokeless tobacco: Never Used   • Tobacco comment: 1-1.5 PPD FOR 30 YEARS   Substance Use Topics   • Alcohol use: Yes     Comment: 36 to 48 oz a day   • Drug use: No       ALLERGIES:  Allergies   Allergen Reactions   • Lisinopril Angioedema         MEDICATIONS:    Current Outpatient Medications:   •  aspirin 81 MG EC tablet, Take 1 tablet by mouth Daily., Disp: 30 tablet, Rfl: 1  •  atorvastatin (LIPITOR) 80 MG tablet, Take 1 tablet by mouth Daily., Disp: 30 tablet, Rfl: 1  •  clopidogrel (PLAVIX) 75 MG tablet, Take 1 tablet by mouth Daily., Disp: 30 tablet, Rfl: 1  •  dilTIAZem CD (Cardizem CD) 300 MG 24 hr capsule, Take 1 capsule by mouth Daily., Disp: 30 capsule, Rfl: 1  •  hydrALAZINE (APRESOLINE) 50 MG tablet, Take 1 tablet by mouth 2 (Two) Times a Day., Disp: 60 tablet, Rfl: 1      Review of Systems   Constitution: Negative for chills, decreased appetite, fever and weight loss.   HENT: Negative for congestion, nosebleeds and sore throat.    Eyes: Negative for blurred vision, visual disturbance and visual halos.   Cardiovascular: Negative for chest pain, dyspnea on exertion and leg swelling.   Respiratory: Negative for cough, shortness of breath, sputum production and wheezing.    Endocrine: Negative for cold intolerance and polyuria.   Hematologic/Lymphatic: Negative for bleeding problem. Bruises/bleeds easily.        Does not report S/S of adverse bleeding event including nose bleeds, hematuria, melena, gingival bleeding, or hematemesis.   Skin: Positive for unusual hair distribution. Negative for flushing and nail changes.   Musculoskeletal: Positive for arthritis, back pain and joint pain.   Gastrointestinal: Negative for bloating, abdominal pain, hematemesis, melena, nausea and vomiting.   Genitourinary: Negative for flank pain and hematuria.   Neurological:  "Negative for brief paralysis, difficulty with concentration, focal weakness, light-headedness, loss of balance, numbness, paresthesias and weakness.   Psychiatric/Behavioral: Negative for altered mental status, depression, substance abuse and suicidal ideas.   Allergic/Immunologic: Negative for hives and persistent infections.         Vitals:    07/21/20 1025   BP: 150/60   BP Location: Left arm   Patient Position: Sitting   Cuff Size: Adult   Pulse: 70   SpO2: 98%   Weight: 64 kg (141 lb)   Height: 182.9 cm (72\")     Physical Exam   Constitutional: He is oriented to person, place, and time. He appears well-developed and well-nourished.   HENT:   Head: Normocephalic and atraumatic.   Mouth/Throat: Oropharynx is clear and moist.   Eyes: Pupils are equal, round, and reactive to light. Conjunctivae and EOM are normal.   Neck: Normal range of motion. Neck supple.   Cardiovascular: Normal rate and intact distal pulses.   No murmur heard.  Pulmonary/Chest: Effort normal and breath sounds normal.   Abdominal: Soft. Bowel sounds are normal.   Musculoskeletal: Normal range of motion. He exhibits no edema or tenderness.   Neurological: He is alert and oriented to person, place, and time. No cranial nerve deficit.   Skin: Skin is warm and dry. Capillary refill takes less than 2 seconds.   There is no evidence of skin breakdown of the bilateral lower extremities.  BLE pink, no evidence of ischemia.  Feet are absent of dependent rubor.   Psychiatric: He has a normal mood and affect. Judgment normal.   Nursing note and vitals reviewed.      Assessment & Plan     Independent Review of Studies  7/2020 RANGEL: Right 1.0 triphasic.  Left 1.05 triphasic.   7/2020 Right Graft US: Patent, [id number] proximal anastamosis  7/2020 Carotid Duplex: GELACIO 50-69% mPSV 133c/s Ratio 1.3, LICA 50-69% mPSV 139c/s Ratio 0.8, Antegrade vertebrals    1. Peripheral vascular disease (CMS/HCC)  Normal resting perfusion.  Denies claudication  ASA, Statin, " Plavix, CCB  Repeat RANGEL in 6 months    - Doppler Ankle Brachial Index Single Level CAR; Future    3. Carotid stenosis, asymptomatic, bilateral  Moderate bilateral carotid stenosis  Asymptomatic at this time  Repeat duplex in 6 months for surveillance  ASA, Statin, Plavix    - Duplex Carotid Ultrasound CAR; Future    4. Mixed hyperlipidemia  Lipid-lowering therapy has been proven beneficial in patients with cardio-vascular disease. Current guidelines recommend statin treatment for all patients with PAD,CAD and carotid stenosis. Statins are beneficial in preventing cardiovascular events, increasing functional capacity and lower the risk of adverse limb loss in PAD. Statins decrease the progression of plaque formation and may improve peripheral vessel lining, and aid in reversing atherosclerosis.    5. Nicotine dependence, cigarettes, with other nicotine-induced disorders  Smoking cessation assistance options offered including behavioral counseling (Smoking Cessation Classes), Nicotine replacement therapy (patches or gum), pharmacologic therapy (Chantix, Wellbutrin). Understands tobacco increases risk of expanding AAA, MI, CVA, PAD, carcinoma. Discussion and question answer period 5-7 minutes.      Detailed discussion regarding risks, benefits, and treatment plan. Images independently reviewed. Patient understands, agrees, and wishes to proceed with plan.       This document has been electronically signed by Landon Piper AGACNP-BC @  On July 23, 2020 10:53      EMR Dragon/Transcription disclaimer:   Much of this encounter note is an electronic transcription/translation of spoken language to printed text. The electronic translation of spoken language may permit erroneous, or at times, nonsensical words or phrases to be inadvertently transcribed; Although I have reviewed the note for such errors, some may still exist.

## 2020-08-13 NOTE — PROGRESS NOTES
Family Medicine Residency  Stephanie Mai MD    Subjective:     Dwight Ryder is a 69 y.o. male who presents for follow-up of hypertension.  Medications include hydralazine and Cardizem.  He does not check his blood pressure at home.  He denies chest pain, dizziness, or blurred vision.  He has occasional headaches that self resolved.  He describes occasional episodes of orthostatic hypotension when he stands from seated position quickly.  He drinks 6 glasses of juice or soda daily.    The following portions of the patient's history were reviewed and updated as appropriate: allergies, current medications, past family history, past medical history, past social history, past surgical history and problem list.    Past Medical Hx:  Past Medical History:   Diagnosis Date   • Artificial lens present     right   • Astigmatism     myopic   • Atherosclerosis of native arteries of extremity with intermittent claudication (CMS/HCC)    • Benign essential hypertension    • Benign prostatic hyperplasia    • Cataract     R>L   • Corneal foreign body     Left eye, 2 years ago      • Essential hypertension    • Examination     individual health   • Exanthematous disorder    • Fracture of foot    • Hypercholesterolemia    • Hyperkalemia    • Hyperlipidemia    • Hypertensive disorder    • Male erectile disorder    • Need for vaccination    • Peripheral vascular disease (CMS/HCC)     Post RIGHT fem-pop bypass graft 1/7/13 Post thrombolysis to graft 12/15/14      • Peripheral vascular disease (CMS/HCC)     unspecified   • Posterior subcapsular polar senile cataract    • Surgical follow-up care     R fem->pop bypass 1/7/13      • Tobacco dependence syndrome    • Tobacco user    • Urticaria     will give kenalog and benadyl , will order allery test      • Visual discomfort        Past Surgical Hx:  Past Surgical History:   Procedure Laterality Date   • ANGIOPLASTY      femoral-popliteal artery (dilation) (Abdominal aortogram,  bilateral lower extremity runoff. Repair of left common femoral artery.)   11/26/2012    • FEMORAL POPLITEAL BYPASS      Right with 6 mm [place]-baron graft)   01/07/2013    • INJECTION OF MEDICATION  03/04/2013    kenalog(3)    • OTHER SURGICAL HISTORY      Remove cataract, insert lens (Cataract extraction with intraocular lens implantation, right eye.)   11/26/2013        Current Meds:    Current Outpatient Medications:   •  aspirin 81 MG EC tablet, Take 1 tablet by mouth Daily., Disp: 30 tablet, Rfl: 1  •  atorvastatin (LIPITOR) 80 MG tablet, Take 1 tablet by mouth Daily., Disp: 30 tablet, Rfl: 1  •  clopidogrel (PLAVIX) 75 MG tablet, Take 1 tablet by mouth Daily., Disp: 30 tablet, Rfl: 1  •  dilTIAZem CD (Cardizem CD) 300 MG 24 hr capsule, Take 1 capsule by mouth Daily., Disp: 30 capsule, Rfl: 1  •  hydrALAZINE (APRESOLINE) 50 MG tablet, Take 1 tablet by mouth 2 (Two) Times a Day., Disp: 60 tablet, Rfl: 1    Allergies:  Allergies   Allergen Reactions   • Lisinopril Angioedema       Family Hx:  Family History   Problem Relation Age of Onset   • Cancer Other    • Heart disease Other    • Hypertension Other    • Stroke Other         Social History:  Social History     Socioeconomic History   • Marital status:      Spouse name: Not on file   • Number of children: Not on file   • Years of education: Not on file   • Highest education level: Not on file   Tobacco Use   • Smoking status: Current Every Day Smoker     Packs/day: 1.00     Years: 30.00     Pack years: 30.00     Types: Cigarettes   • Smokeless tobacco: Never Used   • Tobacco comment: 1-1.5 PPD FOR 30 YEARS   Substance and Sexual Activity   • Alcohol use: Yes     Comment: 36 to 48 oz a day   • Drug use: No   • Sexual activity: Defer       Review of Systems  Review of Systems   Constitutional: Negative for activity change, appetite change, chills, fatigue and fever.   HENT: Negative for congestion, rhinorrhea, sinus pain and sore throat.    Eyes: Negative  "for pain, redness and visual disturbance.   Respiratory: Negative for cough, shortness of breath and wheezing.    Cardiovascular: Negative for chest pain, palpitations and leg swelling.   Gastrointestinal: Negative for abdominal pain, constipation, diarrhea, nausea and vomiting.   Genitourinary: Negative for dysuria and flank pain.   Musculoskeletal: Negative for arthralgias, joint swelling and myalgias.   Skin: Negative for color change, pallor and rash.   Neurological: Negative for dizziness, numbness and headaches.   Psychiatric/Behavioral: Negative for dysphoric mood and sleep disturbance. The patient is not nervous/anxious.        Objective:     /62   Pulse 71   Temp 97 °F (36.1 °C)   Ht 182.9 cm (72\")   Wt 64.9 kg (143 lb)   SpO2 99%   BMI 19.39 kg/m²   Physical Exam   Constitutional: He is oriented to person, place, and time. Vital signs are normal. He appears well-developed and well-nourished. No distress.   HENT:   Head: Normocephalic and atraumatic.   Right Ear: Hearing normal.   Left Ear: Hearing normal.   Eyes: Pupils are equal, round, and reactive to light. Conjunctivae, EOM and lids are normal.   Neck: Normal range of motion. Neck supple. No JVD present.   Cardiovascular: Normal rate, regular rhythm, S1 normal, S2 normal and normal heart sounds.   No murmur heard.  Pulmonary/Chest: Effort normal and breath sounds normal. No respiratory distress. He has no wheezes. He has no rales.   Abdominal: Soft. Normal appearance and bowel sounds are normal. He exhibits no distension. There is no tenderness. There is no guarding.   Musculoskeletal: Normal range of motion. He exhibits no edema, tenderness or deformity.   Lymphadenopathy:     He has no cervical adenopathy.   Neurological: He is alert and oriented to person, place, and time. He has normal strength. He is not disoriented.   Skin: Skin is warm, dry and intact. No rash noted. He is not diaphoretic. No erythema. No pallor.   Psychiatric: He " has a normal mood and affect. His speech is normal and behavior is normal. Cognition and memory are normal.        Assessment/Plan:     Dwight was seen today for hypertension.    Diagnoses and all orders for this visit:    Essential hypertension  -Chronic. Stable. Continue current regimen.  -Advised patient to follow DASH diet, decrease soda intake, exercise, and tobacco cessation.    Tobacco use  ->30 pack year smoking history. Offered cessation options including medication, patch, or gum but patient declines at this time.  -Declining lung cancer screening.      · Rx changes: none  · Patient Education: DASH diet  · Compliance at present is estimated to be good.   · Efforts to improve compliance (if necessary) will be directed at increased exercise.     Follow-up:     Return in about 6 months (around 1/15/2021).    Preventative:  Health Maintenance   Topic Date Due   • LUNG CANCER SCREENING  10/23/2007   • MEDICARE ANNUAL WELLNESS  08/31/2016   • ZOSTER VACCINE (2 of 2) 11/09/2016   • Pneumococcal Vaccine Once at 65 Years Old  10/23/2017   • LIPID PANEL  01/09/2019   • INFLUENZA VACCINE  08/01/2020   • COLONOSCOPY  09/14/2026   • TDAP/TD VACCINES (2 - Td) 09/14/2026   • HEPATITIS C SCREENING  Completed   • AAA SCREEN (ONE-TIME)  Completed     Male Preventative: Colon cancer screening is up to date. Refused lung cancer screening  Recommended: Varicella and Pneumovax- refused  Vaccine Counseling: N/A    Weight  -Class: Normal: 18.5-24.9kg/m2  -Patient's Body mass index is 19.39 kg/m². BMI is within normal parameters. No follow-up required..   eat more fruits and vegetables, decrease soda or juice intake, increase water intake and increase physical activity    Alcohol use:  reports that he drinks alcohol.  Nicotine status  reports that he has been smoking cigarettes. He has a 30.00 pack-year smoking history. He has never used smokeless tobacco.    Goals     • cut back on smoking to 1/2 ppd (pt-stated)       Barrier:  Dependence            RISK SCORE: 3      Stephanie Mai M.D. PGY2  [facility]  [address] [place] [address]  Office: [phone]  This document has been electronically signed by Stephanie Mai MD on August 13, 2020 15:43      Dictated utilizing Dragon dictation.

## 2020-08-14 NOTE — PROGRESS NOTES
I have reviewed the notes, assessments, and/or procedures performed by Dr. Stephanie Mai, I concur with his  documentation and assessment and plan for Dwight Ryder        This document has been electronically signed by Gilbert Smith MD on August 14, 2020 09:28

## 2020-09-24 DIAGNOSIS — I10 ESSENTIAL HYPERTENSION: ICD-10-CM

## 2020-09-24 DIAGNOSIS — I73.9 PERIPHERAL VASCULAR DISEASE (HCC): ICD-10-CM

## 2020-09-24 RX ORDER — ASPIRIN 81 MG/1
81 TABLET ORAL DAILY
Qty: 30 TABLET | Refills: 1 | Status: SHIPPED | OUTPATIENT
Start: 2020-09-24

## 2020-09-24 RX ORDER — ATORVASTATIN CALCIUM 80 MG/1
80 TABLET, FILM COATED ORAL DAILY
Qty: 30 TABLET | Refills: 1 | Status: SHIPPED | OUTPATIENT
Start: 2020-09-24 | End: 2020-09-29 | Stop reason: SDUPTHER

## 2020-09-24 RX ORDER — CLOPIDOGREL BISULFATE 75 MG/1
75 TABLET ORAL DAILY
Qty: 30 TABLET | Refills: 1 | Status: SHIPPED | OUTPATIENT
Start: 2020-09-24 | End: 2020-09-29 | Stop reason: SDUPTHER

## 2020-09-24 RX ORDER — HYDRALAZINE HYDROCHLORIDE 50 MG/1
50 TABLET, FILM COATED ORAL 2 TIMES DAILY
Qty: 60 TABLET | Refills: 1 | Status: SHIPPED | OUTPATIENT
Start: 2020-09-24 | End: 2020-11-20

## 2020-09-24 RX ORDER — DILTIAZEM HYDROCHLORIDE 300 MG/1
300 CAPSULE, COATED, EXTENDED RELEASE ORAL DAILY
Qty: 30 CAPSULE | Refills: 1 | Status: SHIPPED | OUTPATIENT
Start: 2020-09-24 | End: 2020-09-29 | Stop reason: SDUPTHER

## 2020-09-24 NOTE — TELEPHONE ENCOUNTER
PATIENT CALLED AND IS NEEDING REFILLS ON ALL OF HIS MEDICATIONS LISTED IN HIS CHART AND SENT TO [facility] ON [facility]. HIS NUMBER TO CALL BACK [phone].        THANK YOU,      CESARIO

## 2020-09-29 DIAGNOSIS — I73.9 PERIPHERAL VASCULAR DISEASE (HCC): ICD-10-CM

## 2020-09-29 DIAGNOSIS — I10 ESSENTIAL HYPERTENSION: ICD-10-CM

## 2020-09-30 RX ORDER — CLOPIDOGREL BISULFATE 75 MG/1
75 TABLET ORAL DAILY
Qty: 30 TABLET | Refills: 5 | Status: SHIPPED | OUTPATIENT
Start: 2020-09-30 | End: 2020-11-24

## 2020-09-30 RX ORDER — DILTIAZEM HYDROCHLORIDE 300 MG/1
300 CAPSULE, COATED, EXTENDED RELEASE ORAL DAILY
Qty: 30 CAPSULE | Refills: 5 | Status: SHIPPED | OUTPATIENT
Start: 2020-09-30 | End: 2020-11-24

## 2020-09-30 RX ORDER — ATORVASTATIN CALCIUM 80 MG/1
80 TABLET, FILM COATED ORAL DAILY
Qty: 30 TABLET | Refills: 5 | Status: SHIPPED | OUTPATIENT
Start: 2020-09-30 | End: 2020-11-24

## 2020-11-20 DIAGNOSIS — I10 ESSENTIAL HYPERTENSION: ICD-10-CM

## 2020-11-20 RX ORDER — HYDRALAZINE HYDROCHLORIDE 50 MG/1
TABLET, FILM COATED ORAL
Qty: 60 TABLET | Refills: 1 | Status: SHIPPED | OUTPATIENT
Start: 2020-11-20 | End: 2021-01-18

## 2020-11-22 DIAGNOSIS — I73.9 PERIPHERAL VASCULAR DISEASE (HCC): ICD-10-CM

## 2020-11-22 DIAGNOSIS — I10 ESSENTIAL HYPERTENSION: ICD-10-CM

## 2020-11-24 RX ORDER — CLOPIDOGREL BISULFATE 75 MG/1
75 TABLET ORAL DAILY
Qty: 30 TABLET | Refills: 5 | Status: SHIPPED | OUTPATIENT
Start: 2020-11-24 | End: 2021-03-18

## 2020-11-24 RX ORDER — ATORVASTATIN CALCIUM 80 MG/1
80 TABLET, FILM COATED ORAL DAILY
Qty: 30 TABLET | Refills: 5 | Status: SHIPPED | OUTPATIENT
Start: 2020-11-24 | End: 2021-03-18

## 2020-11-24 RX ORDER — DILTIAZEM HYDROCHLORIDE 300 MG/1
300 CAPSULE, COATED, EXTENDED RELEASE ORAL DAILY
Qty: 30 CAPSULE | Refills: 5 | Status: SHIPPED | OUTPATIENT
Start: 2020-11-24 | End: 2021-03-18

## 2021-01-18 DIAGNOSIS — I10 ESSENTIAL HYPERTENSION: ICD-10-CM

## 2021-01-18 RX ORDER — HYDRALAZINE HYDROCHLORIDE 50 MG/1
TABLET, FILM COATED ORAL
Qty: 180 TABLET | Refills: 2 | Status: SHIPPED | OUTPATIENT
Start: 2021-01-18 | End: 2021-06-16

## 2021-01-21 ENCOUNTER — LAB (OUTPATIENT)
Dept: LAB | Facility: HOSPITAL | Age: 71
End: 2021-01-21

## 2021-01-21 ENCOUNTER — TRANSCRIBE ORDERS (OUTPATIENT)
Dept: GENERAL RADIOLOGY | Facility: HOSPITAL | Age: 71
End: 2021-01-21

## 2021-01-21 ENCOUNTER — OFFICE VISIT (OUTPATIENT)
Dept: CARDIAC SURGERY | Facility: CLINIC | Age: 71
End: 2021-01-21

## 2021-01-21 VITALS
BODY MASS INDEX: 20.15 KG/M2 | DIASTOLIC BLOOD PRESSURE: 70 MMHG | HEART RATE: 76 BPM | SYSTOLIC BLOOD PRESSURE: 131 MMHG | WEIGHT: 148.8 LBS | TEMPERATURE: 98.4 F | HEIGHT: 72 IN | OXYGEN SATURATION: 99 %

## 2021-01-21 DIAGNOSIS — I65.23 CAROTID STENOSIS, ASYMPTOMATIC, BILATERAL: ICD-10-CM

## 2021-01-21 DIAGNOSIS — I73.9 PERIPHERAL VASCULAR DISEASE (HCC): ICD-10-CM

## 2021-01-21 DIAGNOSIS — E78.2 MIXED HYPERLIPIDEMIA: ICD-10-CM

## 2021-01-21 DIAGNOSIS — Z72.0 TOBACCO USE: Primary | ICD-10-CM

## 2021-01-21 DIAGNOSIS — N18.31 STAGE 3A CHRONIC KIDNEY DISEASE (HCC): ICD-10-CM

## 2021-01-21 DIAGNOSIS — Z01.818 PRE-OP TESTING: Primary | ICD-10-CM

## 2021-01-21 LAB
ANION GAP SERPL CALCULATED.3IONS-SCNC: 9 MMOL/L (ref 5–15)
BUN SERPL-MCNC: 15 MG/DL (ref 8–23)
BUN/CREAT SERPL: 9.5 (ref 7–25)
CALCIUM SPEC-SCNC: 9.1 MG/DL (ref 8.6–10.5)
CHLORIDE SERPL-SCNC: 104 MMOL/L (ref 98–107)
CO2 SERPL-SCNC: 20 MMOL/L (ref 22–29)
CREAT SERPL-MCNC: 1.58 MG/DL (ref 0.76–1.27)
GFR SERPL CREATININE-BSD FRML MDRD: 53 ML/MIN/1.73
GLUCOSE SERPL-MCNC: 89 MG/DL (ref 65–99)
POTASSIUM SERPL-SCNC: 4.6 MMOL/L (ref 3.5–5.2)
SODIUM SERPL-SCNC: 133 MMOL/L (ref 136–145)

## 2021-01-21 PROCEDURE — 99214 OFFICE O/P EST MOD 30 MIN: CPT | Performed by: NURSE PRACTITIONER

## 2021-01-21 PROCEDURE — 80048 BASIC METABOLIC PNL TOTAL CA: CPT

## 2021-01-21 PROCEDURE — 36415 COLL VENOUS BLD VENIPUNCTURE: CPT

## 2021-01-21 RX ORDER — SODIUM CHLORIDE 9 MG/ML
175 INJECTION, SOLUTION INTRAVENOUS ONCE
Status: CANCELLED | OUTPATIENT
Start: 2021-01-21

## 2021-01-21 RX ORDER — SODIUM CHLORIDE 9 MG/ML
175 INJECTION, SOLUTION INTRAVENOUS ONCE
Status: CANCELLED | OUTPATIENT
Start: 2021-01-21 | End: 2021-01-21

## 2021-01-21 NOTE — PROGRESS NOTES
CVTS Office Progress Note     1/21/2021    Dwight Ryder  1950    Chief Complaint:    Chief Complaint   Patient presents with   • Peripheral Vascular Disease       HPI:      PCP:  Stephanie Mai MD    70 y.o. male with HTN(stable, increased risk stroke, rupture), Hyperlipidemia(stable, increased risk cardiovascular events) and Smoker(uncontrolled, increased risk cardiovascular events) BPH, PVD (stable, increased risk cardiovascular events).  smokes 1 PPD.  Established with CTVS in 2013 with lifestyle limiting claudication, prior R SFA stent occluded.  Underwent bypass which occluded the following year requiring lysis.  Unfortunately he continues to smoke.  No complaints of claudication at today's visit.  No other associated signs, symptoms or modifying factors.    2012 RIGHT SFA stent  2013 RIGHT femoral to popliteal artery bypass (PTFE)  12/15/2014 RIGHT lower extremity angiogram:  RIGHT fem-pop thrombolysis, EKOS.  12/2020 Restudy: Graft patent  11/22/2016 RANGEL:  RIGHT 1.1 triphasic.  LEFT 1.1 triphasic.  Graft 169cm/s, triphasic.  5/22/2017 RANGEL:  RIGHT 1.1 triphasic.  LEFT 1.0 triphasic.  Graft 186cm/s, triphasic.  12/06/17   RANGEL:  RIGHT 1.12  Triphasic.  LEFT 0.99  Triphasic.  Graft Patent 237 cm/s, triphasic.  06/07/18  RANGEL:  RIGHT 0.99  Triphasic.  LEFT 0.98  Triphasic.   12/18/18  RANGEL: Right 1.04 Fem triphasic Poptriphasic DP triphasic PT triphasic, Left 0.95 Fem triphasic Poptriphasic DP triphasic PT triphasic  12/18/18  U/S Graft Survey: RT PTFE bypass patent mPSV 236 at proximal anastomosis   7/16/19 RANGEL: Right 1.07 triphasic.  Left 0.99 triphasic.  7/2020 RANGEL: Right 1.0 triphasic.  Left 1.05 triphasic.   7/2020 Right Graft US: Patent, [id number] proximal anastamosis  1/2021 RANGEL: Right 0.9 triphasic.  Left 0.94 triphasic.     7/2020 Carotid Duplex: GELACIO 50-69% mPSV 133c/s Ratio 1.3, LICA 50-69% mPSV 139c/s Ratio 0.8, Antegrade vertebrals  1/2021 Carotid Duplex: GELACIO >70% mPSV 235c/s Ratio  1.9, LICA 50-69% mPSV 154c/s Ratio 1.2, Antegrade vertebrals    The following portions of the patient's history were reviewed and updated as appropriate: allergies, current medications, past family history, past medical history, past social history, past surgical history and problem list.  Recent images independently reviewed.  Available laboratory values reviewed.    PMH:  Past Medical History:   Diagnosis Date   • Artificial lens present     right   • Astigmatism     myopic   • Atherosclerosis of native arteries of extremity with intermittent claudication (CMS/HCC)    • Benign essential hypertension    • Benign prostatic hyperplasia    • Cataract     R>L   • Corneal foreign body     Left eye, 2 years ago      • Essential hypertension    • Examination     individual health   • Exanthematous disorder    • Fracture of foot    • Hypercholesterolemia    • Hyperkalemia    • Hyperlipidemia    • Hypertensive disorder    • Male erectile disorder    • Need for vaccination    • Peripheral vascular disease (CMS/HCC)     Post RIGHT fem-pop bypass graft 1/7/13 Post thrombolysis to graft 12/15/14      • Peripheral vascular disease (CMS/HCC)     unspecified   • Posterior subcapsular polar senile cataract    • Surgical follow-up care     R fem->pop bypass 1/7/13      • Tobacco dependence syndrome    • Tobacco user    • Urticaria     will give kenalog and benadyl , will order allery test      • Visual discomfort      Past Surgical History:   Procedure Laterality Date   • ANGIOPLASTY      femoral-popliteal artery (dilation) (Abdominal aortogram, bilateral lower extremity runoff. Repair of left common femoral artery.)   11/26/2012    • FEMORAL POPLITEAL BYPASS      Right with 6 mm [place]-baron graft)   01/07/2013    • INJECTION OF MEDICATION  03/04/2013    kenalog(3)    • OTHER SURGICAL HISTORY      Remove cataract, insert lens (Cataract extraction with intraocular lens implantation, right eye.)   11/26/2013      Family History   Problem  Relation Age of Onset   • Cancer Other    • Heart disease Other    • Hypertension Other    • Stroke Other      Social History     Tobacco Use   • Smoking status: Current Every Day Smoker     Packs/day: 2.00     Years: 30.00     Pack years: 60.00     Types: Cigarettes   • Smokeless tobacco: Never Used   • Tobacco comment: 1.5-2 PPD   Substance Use Topics   • Alcohol use: Yes     Comment: 36 to 48 oz a day   • Drug use: No       ALLERGIES:  Allergies   Allergen Reactions   • Lisinopril Angioedema         MEDICATIONS:    Current Outpatient Medications:   •  aspirin 81 MG EC tablet, Take 1 tablet by mouth Daily., Disp: 30 tablet, Rfl: 1  •  atorvastatin (LIPITOR) 80 MG tablet, TAKE 1 TABLET BY MOUTH DAILY, Disp: 30 tablet, Rfl: 5  •  clopidogrel (PLAVIX) 75 MG tablet, TAKE 1 TABLET BY MOUTH DAILY, Disp: 30 tablet, Rfl: 5  •  dilTIAZem CD (CARDIZEM CD) 300 MG 24 hr capsule, TAKE 1 CAPSULE BY MOUTH DAILY, Disp: 30 capsule, Rfl: 5  •  hydrALAZINE (APRESOLINE) 50 MG tablet, TAKE 1 TABLET BY MOUTH TWICE DAILY, Disp: 180 tablet, Rfl: 2      Review of Systems   Constitution: Negative for chills, decreased appetite, fever and weight loss.   HENT: Negative for congestion, nosebleeds and sore throat.    Eyes: Negative for blurred vision, visual disturbance and visual halos.   Cardiovascular: Negative for chest pain, dyspnea on exertion and leg swelling.   Respiratory: Negative for cough, shortness of breath, sputum production and wheezing.    Endocrine: Negative for cold intolerance and polyuria.   Hematologic/Lymphatic: Negative for bleeding problem. Bruises/bleeds easily.        Does not report S/S of adverse bleeding event including nose bleeds, hematuria, melena, gingival bleeding, or hematemesis.   Skin: Positive for unusual hair distribution. Negative for flushing and nail changes.   Musculoskeletal: Positive for arthritis, back pain and joint pain.   Gastrointestinal: Negative for bloating, abdominal pain, hematemesis,  "melena, nausea and vomiting.   Genitourinary: Negative for flank pain and hematuria.   Neurological: Negative for brief paralysis, difficulty with concentration, focal weakness, light-headedness, loss of balance, numbness, paresthesias and weakness.   Psychiatric/Behavioral: Negative for altered mental status, depression, substance abuse and suicidal ideas.   Allergic/Immunologic: Negative for hives and persistent infections.         Vitals:    01/21/21 1036   BP: 131/70   BP Location: Left arm   Patient Position: Sitting   Pulse: 76   Temp: 98.4 °F (36.9 °C)   TempSrc: Infrared   SpO2: 99%   Weight: 67.5 kg (148 lb 12.8 oz)   Height: 182.9 cm (72\")     Physical Exam   Constitutional: He is oriented to person, place, and time. He appears well-developed.   HENT:   Head: Normocephalic and atraumatic.   Eyes: Pupils are equal, round, and reactive to light. Conjunctivae are normal.   Neck: Normal range of motion. Neck supple.   Cardiovascular: Normal rate.   No murmur heard.  Pulmonary/Chest: Effort normal and breath sounds normal.   Abdominal: Soft. Bowel sounds are normal.   Musculoskeletal: Normal range of motion. No tenderness.   Neurological: He is alert and oriented to person, place, and time. No cranial nerve deficit.   Skin: Skin is warm and dry. Capillary refill takes less than 2 seconds.   There is no evidence of skin breakdown of the bilateral lower extremities.  BLE pink, no evidence of ischemia.  Feet are absent of dependent rubor.   Psychiatric: Judgment normal.   Nursing note and vitals reviewed.      Assessment & Plan     Independent Review of Studies  1/2021 RANGEL: Right 0.9 triphasic.  Left 0.94 triphasic.   1/2021 Carotid Duplex: GELACIO >70% mPSV 235c/s Ratio 1.9, LICA 50-69% mPSV 154c/s Ratio 1.2, Antegrade vertebrals    1. Tobacco use  Smoking cessation assistance options offered including behavioral counseling (Smoking Cessation Classes), Nicotine replacement therapy (patches or gum), pharmacologic " therapy (Chantix, Wellbutrin). Understands tobacco increases risk of expanding AAA, MI, CVA, PAD, carcinoma. Discussion and question answer period 5-7 minutes.    2. Peripheral vascular disease (CMS/HCC)  Normal resting perfusion on today's RANGEL  Continue aspirin, Plavix, statin  Plan repeat RANGEL and graft surveillance in 6 months    3. Carotid stenosis, asymptomatic, bilateral  Severe right ICA stenosis indicated by velocity on today's exam.  Further imaging required to determine degree of stenosis and potential treatment options.   Obtain CTA carotid    No recent chemistry on file, obtain BMP    Will require pre/post IVF for CTA    - Basic Metabolic Panel; Future  - CT Angiogram Carotids; Future    4. Mixed hyperlipidemia  Lipid-lowering therapy has been proven beneficial in patients with cardio-vascular disease. Current guidelines recommend statin treatment for all patients with PAD,CAD and carotid stenosis. Statins are beneficial in preventing cardiovascular events, increasing functional capacity and lower the risk of adverse limb loss in PAD. Statins decrease the progression of plaque formation and may improve peripheral vessel lining, and aid in reversing atherosclerosis.    5. Stage 3a chronic kidney disease  Lab Results   Component Value Date    GLUCOSE 89 01/21/2021    CALCIUM 9.1 01/21/2021     (L) 01/21/2021    K 4.6 01/21/2021    CO2 20.0 (L) 01/21/2021     01/21/2021    BUN 15 01/21/2021    CREATININE 1.58 (H) 01/21/2021    EGFRIFAFRI 53 (L) 01/21/2021    BCR 9.5 01/21/2021    ANIONGAP 9.0 01/21/2021       - Ambulatory Referral to Nephrology        Detailed discussion regarding risks, benefits, and treatment plan. Images independently reviewed. Patient understands, agrees, and wishes to proceed with plan.       This document has been electronically signed by OYVANA Martinez-BC @  On January 21, 2021 13:16 CST      EMR Dragon/Transcription disclaimer:   Much of this encounter note is an  electronic transcription/translation of spoken language to printed text. The electronic translation of spoken language may permit erroneous, or at times, nonsensical words or phrases to be inadvertently transcribed; Although I have reviewed the note for such errors, some may still exist.

## 2021-01-21 NOTE — PATIENT INSTRUCTIONS
The results of your carotid ultrasound shows severe disease of the right carotid and is worsening from previous exam, ultrasound of the left carotid shows mild disease and is stable from previous exam.    Obtain CTA and follow up in office to review results    If you should experience any neurological symptoms including but not limited to visual or speech disturbances confusion, seizures, or weakness of limbs of one side of your body notify Heart and Vascular center immediately for evaluation or if after hours present to the nearest Emergency Department.     The results of your vascular studies of your right leg shows mild disease with good  circulation, in the left leg shows milddisease with good circulation.      If signs and symptoms of ischemia should occur including but not limited to pale/blue discoloration of limb, increasing pain with ambulation or at rest, or a non-healing wound. Patient is to notify Heart and Vascular center for immediate evaluation.

## 2021-01-24 ENCOUNTER — LAB (OUTPATIENT)
Dept: LAB | Facility: HOSPITAL | Age: 71
End: 2021-01-24

## 2021-01-24 DIAGNOSIS — Z01.818 PRE-OP TESTING: ICD-10-CM

## 2021-01-24 PROCEDURE — U0004 COV-19 TEST NON-CDC HGH THRU: HCPCS

## 2021-01-24 PROCEDURE — C9803 HOPD COVID-19 SPEC COLLECT: HCPCS

## 2021-01-25 LAB — SARS-COV-2 ORF1AB RESP QL NAA+PROBE: NOT DETECTED

## 2021-01-27 ENCOUNTER — HOSPITAL ENCOUNTER (OUTPATIENT)
Dept: CT IMAGING | Facility: HOSPITAL | Age: 71
Discharge: HOME | End: 2021-01-27
Admitting: NURSE PRACTITIONER

## 2021-01-27 VITALS
SYSTOLIC BLOOD PRESSURE: 158 MMHG | DIASTOLIC BLOOD PRESSURE: 69 MMHG | TEMPERATURE: 98.3 F | HEART RATE: 67 BPM | RESPIRATION RATE: 18 BRPM | OXYGEN SATURATION: 97 % | HEIGHT: 72 IN | BODY MASS INDEX: 19.83 KG/M2 | WEIGHT: 146.39 LBS

## 2021-01-27 DIAGNOSIS — N18.31 STAGE 3A CHRONIC KIDNEY DISEASE (HCC): ICD-10-CM

## 2021-01-27 DIAGNOSIS — I65.23 CAROTID STENOSIS, ASYMPTOMATIC, BILATERAL: ICD-10-CM

## 2021-01-27 PROCEDURE — 70498 CT ANGIOGRAPHY NECK: CPT

## 2021-01-27 PROCEDURE — 0 IOPAMIDOL PER 1 ML: Performed by: NURSE PRACTITIONER

## 2021-01-27 RX ORDER — SODIUM CHLORIDE 9 MG/ML
175 INJECTION, SOLUTION INTRAVENOUS ONCE
Status: DISCONTINUED | OUTPATIENT
Start: 2021-01-27 | End: 2021-01-28 | Stop reason: HOSPADM

## 2021-01-27 RX ORDER — SODIUM CHLORIDE 9 MG/ML
175 INJECTION, SOLUTION INTRAVENOUS ONCE
Status: COMPLETED | OUTPATIENT
Start: 2021-01-27 | End: 2021-01-27

## 2021-01-27 RX ADMIN — SODIUM CHLORIDE 175 ML/HR: 9 INJECTION, SOLUTION INTRAVENOUS at 10:55

## 2021-01-27 RX ADMIN — IOPAMIDOL 90 ML: 755 INJECTION, SOLUTION INTRAVENOUS at 11:55

## 2021-01-28 ENCOUNTER — OFFICE VISIT (OUTPATIENT)
Dept: CARDIAC SURGERY | Facility: CLINIC | Age: 71
End: 2021-01-28

## 2021-01-28 DIAGNOSIS — I65.23 CAROTID STENOSIS, ASYMPTOMATIC, BILATERAL: Primary | ICD-10-CM

## 2021-01-28 DIAGNOSIS — I10 ESSENTIAL HYPERTENSION: ICD-10-CM

## 2021-01-28 DIAGNOSIS — F17.218 NICOTINE DEPENDENCE, CIGARETTES, WITH OTHER NICOTINE-INDUCED DISORDERS: ICD-10-CM

## 2021-01-28 DIAGNOSIS — N18.31 STAGE 3A CHRONIC KIDNEY DISEASE (HCC): ICD-10-CM

## 2021-01-28 PROCEDURE — 99215 OFFICE O/P EST HI 40 MIN: CPT | Performed by: NURSE PRACTITIONER

## 2021-01-28 NOTE — PATIENT INSTRUCTIONS
severe Carotid Artery Stenosis right 75% on CTA Asymptomatic   Medical Management: ASA,PLAVIX,STATIN   Proceed with TCAR (Carotid Stent) Evaluation  Office will call with surgical plan  If you should experience any neurological symptoms including but not limited to visual or speech disturbances confusion, seizures, or weakness of limbs of one side of your body notify Heart and Vascular center immediately for evaluation or if after hours present to the nearest Emergency Department.    Return as scheduled    Smoking cessation advised.  Tobacco increases risk of expanding AAA, MI, CVA, PAD, carcinoma.

## 2021-01-28 NOTE — PROGRESS NOTES
CVTS Office Progress Note     Dwight Ryder  1950    Chief Complaint:    Chief Complaint   Patient presents with   • Carotid Artery Disease     ct results       HPI:      PCP:  Stephanie Mai MD    70 y.o. male with HTN(stable, increased risk stroke, rupture), Hyperlipidemia(stable, increased risk cardiovascular events) and Smoker(uncontrolled, increased risk cardiovascular events) BPH, PVD (stable, increased risk cardiovascular events).  smokes 1 PPD.  Established with CTVS in 2013 with lifestyle limiting claudication, prior R SFA stent occluded.  Underwent bypass which occluded the following year requiring lysis.  Unfortunately he continues to smoke.  No complaints of claudication at today's visit.  No other associated signs, symptoms or modifying factors. Returns for CTA results.    2012 RIGHT SFA stent  2013 RIGHT femoral to popliteal artery bypass (PTFE)  12/15/2014 RIGHT lower extremity angiogram:  RIGHT fem-pop thrombolysis, EKOS.  12/2020 Restudy: Graft patent  11/22/2016 RANGEL:  RIGHT 1.1 triphasic.  LEFT 1.1 triphasic.  Graft 169cm/s, triphasic.  5/22/2017 RANGEL:  RIGHT 1.1 triphasic.  LEFT 1.0 triphasic.  Graft 186cm/s, triphasic.  12/06/17   RANGEL:  RIGHT 1.12  Triphasic.  LEFT 0.99  Triphasic.  Graft Patent 237 cm/s, triphasic.  06/07/18  RANGEL:  RIGHT 0.99  Triphasic.  LEFT 0.98  Triphasic.   12/18/18  RANGEL: Right 1.04 Fem triphasic Poptriphasic DP triphasic PT triphasic, Left 0.95 Fem triphasic Poptriphasic DP triphasic PT triphasic  12/18/18  U/S Graft Survey: RT PTFE bypass patent mPSV 236 at proximal anastomosis   7/16/19 RANGEL: Right 1.07 triphasic.  Left 0.99 triphasic.  7/2020 RANGEL: Right 1.0 triphasic.  Left 1.05 triphasic.   7/2020 Right Graft US: Patent, [id number] proximal anastamosis  1/2021 RANGEL: Right 0.9 triphasic.  Left 0.94 triphasic.     7/2020 Carotid Duplex: GELACIO 50-69% mPSV 133c/s Ratio 1.3, LICA 50-69% mPSV 139c/s Ratio 0.8, Antegrade vertebrals  1/2021 Carotid Duplex: GELACIO >70%  mPSV 235c/s Ratio 1.9, LICA 50-69% mPSV 154c/s Ratio 1.2, Antegrade vertebrals  1/27/2021:CTA Carotids: GELACIO 80% RSCA 60% LICA 40%    The following portions of the patient's history were reviewed and updated as appropriate: allergies, current medications, past family history, past medical history, past social history, past surgical history and problem list.  Recent images independently reviewed.  Available laboratory values reviewed.    PMH:  Past Medical History:   Diagnosis Date   • Artificial lens present     right   • Astigmatism     myopic   • Atherosclerosis of native arteries of extremity with intermittent claudication (CMS/HCC)    • Benign essential hypertension    • Benign prostatic hyperplasia    • Cataract     R>L   • Corneal foreign body     Left eye, 2 years ago      • Essential hypertension    • Examination     individual health   • Exanthematous disorder    • Fracture of foot    • Hypercholesterolemia    • Hyperkalemia    • Hyperlipidemia    • Hypertensive disorder    • Male erectile disorder    • Need for vaccination    • Peripheral vascular disease (CMS/HCC)     Post RIGHT fem-pop bypass graft 1/7/13 Post thrombolysis to graft 12/15/14      • Peripheral vascular disease (CMS/HCC)     unspecified   • Posterior subcapsular polar senile cataract    • Surgical follow-up care     R fem->pop bypass 1/7/13      • Tobacco dependence syndrome    • Tobacco user    • Urticaria     will give kenalog and benadyl , will order allery test      • Visual discomfort      Past Surgical History:   Procedure Laterality Date   • ANGIOPLASTY      femoral-popliteal artery (dilation) (Abdominal aortogram, bilateral lower extremity runoff. Repair of left common femoral artery.)   11/26/2012    • FEMORAL POPLITEAL BYPASS      Right with 6 mm [place]-baron graft)   01/07/2013    • INJECTION OF MEDICATION  03/04/2013    kenalog(3)    • OTHER SURGICAL HISTORY      Remove cataract, insert lens (Cataract extraction with intraocular  lens implantation, right eye.)   11/26/2013      Family History   Problem Relation Age of Onset   • Cancer Other    • Heart disease Other    • Hypertension Other    • Stroke Other      Social History     Tobacco Use   • Smoking status: Current Every Day Smoker     Packs/day: 2.00     Years: 30.00     Pack years: 60.00     Types: Cigarettes   • Smokeless tobacco: Never Used   Substance Use Topics   • Alcohol use: Yes     Alcohol/week: 6.0 standard drinks     Types: 6 Cans of beer per week     Comment: daily   • Drug use: No       ALLERGIES:  Allergies   Allergen Reactions   • Lisinopril Angioedema         MEDICATIONS:    Current Outpatient Medications:   •  aspirin 81 MG EC tablet, Take 1 tablet by mouth Daily., Disp: 30 tablet, Rfl: 1  •  atorvastatin (LIPITOR) 80 MG tablet, TAKE 1 TABLET BY MOUTH DAILY, Disp: 30 tablet, Rfl: 5  •  clopidogrel (PLAVIX) 75 MG tablet, TAKE 1 TABLET BY MOUTH DAILY, Disp: 30 tablet, Rfl: 5  •  dilTIAZem CD (CARDIZEM CD) 300 MG 24 hr capsule, TAKE 1 CAPSULE BY MOUTH DAILY, Disp: 30 capsule, Rfl: 5  •  hydrALAZINE (APRESOLINE) 50 MG tablet, TAKE 1 TABLET BY MOUTH TWICE DAILY (Patient taking differently: Take 50 mg by mouth 2 (two) times a day.), Disp: 180 tablet, Rfl: 2      Review of Systems   Constitution: Negative for chills, decreased appetite, fever and weight loss.   HENT: Negative for congestion, nosebleeds and sore throat.    Eyes: Negative for blurred vision, visual disturbance and visual halos.   Cardiovascular: Negative for chest pain, dyspnea on exertion and leg swelling.   Respiratory: Negative for cough, shortness of breath, sputum production and wheezing.    Endocrine: Negative for cold intolerance and polyuria.   Hematologic/Lymphatic: Negative for bleeding problem. Bruises/bleeds easily.   Skin: Positive for unusual hair distribution. Negative for flushing and nail changes.   Musculoskeletal: Positive for arthritis, back pain and joint pain.   Gastrointestinal: Negative  for bloating, abdominal pain, hematemesis, melena, nausea and vomiting.   Genitourinary: Negative for flank pain and hematuria.   Neurological: Negative for brief paralysis, difficulty with concentration, focal weakness, light-headedness, loss of balance, numbness, paresthesias and weakness.   Psychiatric/Behavioral: Negative for altered mental status, depression, substance abuse and suicidal ideas.   Allergic/Immunologic: Negative for hives and persistent infections.         Vitals:    01/29/21 0900   BP: 140/59   Pulse: 73   Resp: 18   SpO2: 100%     Physical Exam   Constitutional: He is oriented to person, place, and time. He appears well-developed.   HENT:   Head: Normocephalic and atraumatic.   Eyes: Pupils are equal, round, and reactive to light. Conjunctivae are normal.   Neck: Normal range of motion. Neck supple.   Cardiovascular: Normal rate.   No murmur heard.  Pulmonary/Chest: Effort normal and breath sounds normal.   Abdominal: Soft. Bowel sounds are normal.   Musculoskeletal: Normal range of motion. No tenderness.   Neurological: He is alert and oriented to person, place, and time. No cranial nerve deficit.   Skin: Skin is warm and dry. Capillary refill takes less than 2 seconds.   There is no evidence of skin breakdown of the bilateral lower extremities.  BLE pink, no evidence of ischemia.  Feet are absent of dependent rubor.   Psychiatric: Judgment normal.   Nursing note and vitals reviewed.      Assessment & Plan     Independent Review of Studies  Detailed discussion regarding risks, benefits, and treatment plan. Images independently reviewed. Patient understands, agrees, and wishes to proceed with plan.      1. Carotid stenosis, asymptomatic, bilateral  severe Carotid Artery Stenosis right 75% on CTA Asymptomatic   Medical Management: ASA,PLAVIX,STATIN   Proceed with TCAR (Carotid Stent) Evaluation  Office will call with surgical plan  If you should experience any neurological symptoms including but  not limited to visual or speech disturbances confusion, seizures, or weakness of limbs of one side of your body notify Heart and Vascular center immediately for evaluation or if after hours present to the nearest Emergency Department.    Return as scheduled    Detailed discussion with Dwight Ryder regarding situation, options and plans.  severe,  asymptomatic GELACIO 80% carotid stenosis.  Carotid intervention is advisable.  Increased risk for Stroke and cardiovascular complications.  Carotid stenting is advisable. Surgical inaccessible lesion, films sent for TCAR evaluation. Reviewed with Dr. Robison    At length discussion regarding options for carotid intervention including TCAR procedure,stent placement and surgical endarterectomy including stroke risk, hospital stay, and recovery. Questions answered.      2. Stage 3a chronic kidney disease (CMS/[facility])  Lab Results   Component Value Date    CREATININE 1.58 (H) 01/21/2021       3. Essential hypertension  Controlled.     4. Nicotine dependence, cigarettes, with other nicotine-induced disorders  Smoking cessation assistance options offered including behavioral counseling (Smoking Cessation Classes), Nicotine replacement therapy (patches or gum), pharmacologic therapy (Chantix, Wellbutrin). Understands tobacco increases risk of expanding AAA, MI, CVA, PAD, carcinoma. Discussion and question answer period 5-7 minutes. Dwight Ryder  reports that he has been smoking cigarettes. He has a 60.00 pack-year smoking history. He has never used smokeless tobacco.. I have educated him on the risk of diseases from using tobacco products such as cancer, COPD and heart disease.     I advised him to quit and he is not willing to quit.    I spent 5 minutes counseling the patient.    Time spent 40 minutes in face to face evaluation, reviewing of medical history, tests, and procedures. Independent interpretation of vascular studies, ordering additional tests, documentation, and  coordination of care.   Counseling and education with the patient and family regarding treatment options, plan of care, and hoped for outcomes. All questions answered.              This document has been electronically signed by SCOUT Borges on January 29, 2021 10:54 CST

## 2021-01-29 VITALS
HEART RATE: 73 BPM | SYSTOLIC BLOOD PRESSURE: 140 MMHG | DIASTOLIC BLOOD PRESSURE: 59 MMHG | RESPIRATION RATE: 18 BRPM | OXYGEN SATURATION: 100 %

## 2021-02-12 ENCOUNTER — HOSPITAL ENCOUNTER (OUTPATIENT)
Dept: ULTRASOUND IMAGING | Facility: HOSPITAL | Age: 71
Discharge: HOME | End: 2021-02-12
Admitting: NURSE PRACTITIONER

## 2021-02-12 DIAGNOSIS — N18.31 CHRONIC KIDNEY DISEASE, STAGE 3A (HCC): ICD-10-CM

## 2021-02-12 DIAGNOSIS — Z72.0 TOBACCO USE: ICD-10-CM

## 2021-02-12 DIAGNOSIS — I10 ESSENTIAL HYPERTENSION: ICD-10-CM

## 2021-02-12 DIAGNOSIS — I73.9 PERIPHERAL VASCULAR DISEASE (HCC): ICD-10-CM

## 2021-02-12 PROCEDURE — A4648 IMPLANTABLE TISSUE MARKER: HCPCS

## 2021-02-12 PROCEDURE — 76775 US EXAM ABDO BACK WALL LIM: CPT

## 2021-02-15 ENCOUNTER — TELEPHONE (OUTPATIENT)
Dept: CARDIAC SURGERY | Facility: CLINIC | Age: 71
End: 2021-02-15

## 2021-02-15 NOTE — TELEPHONE ENCOUNTER
Talked to Mr. Ryder about his upcoming surgery Friday 3/5/2021. Let him know they will call him the day before to let him know what time he needs to be here. covid testing on Tuesday 3/2/2021 from 9-10 am. PAT on Tuesday 3/2/2021 at 10 am. Patient stated he understood.

## 2021-02-18 ENCOUNTER — PREP FOR SURGERY (OUTPATIENT)
Dept: OTHER | Facility: HOSPITAL | Age: 71
End: 2021-02-18

## 2021-02-18 DIAGNOSIS — I65.23 CAROTID STENOSIS, ASYMPTOMATIC, BILATERAL: Primary | ICD-10-CM

## 2021-02-18 RX ORDER — BUPIVACAINE HCL/0.9 % NACL/PF 0.1 %
2 PLASTIC BAG, INJECTION (ML) EPIDURAL ONCE
Status: CANCELLED | OUTPATIENT
Start: 2021-03-05 | End: 2021-02-18

## 2021-02-18 RX ORDER — SODIUM CHLORIDE 0.9 % (FLUSH) 0.9 %
3 SYRINGE (ML) INJECTION EVERY 12 HOURS SCHEDULED
Status: CANCELLED | OUTPATIENT
Start: 2021-03-05

## 2021-02-18 RX ORDER — SODIUM CHLORIDE 9 MG/ML
100 INJECTION, SOLUTION INTRAVENOUS CONTINUOUS
Status: CANCELLED | OUTPATIENT
Start: 2021-03-05

## 2021-02-18 RX ORDER — SODIUM CHLORIDE 0.9 % (FLUSH) 0.9 %
10 SYRINGE (ML) INJECTION AS NEEDED
Status: CANCELLED | OUTPATIENT
Start: 2021-03-05

## 2021-02-23 ENCOUNTER — TRANSCRIBE ORDERS (OUTPATIENT)
Dept: LAB | Facility: HOSPITAL | Age: 71
End: 2021-02-23

## 2021-02-23 ENCOUNTER — LAB (OUTPATIENT)
Dept: LAB | Facility: HOSPITAL | Age: 71
End: 2021-02-23

## 2021-02-23 DIAGNOSIS — I73.9 PERIPHERAL VASCULAR DISEASE, UNSPECIFIED (HCC): ICD-10-CM

## 2021-02-23 DIAGNOSIS — N18.31 CHRONIC KIDNEY DISEASE (CKD) STAGE G3A/A1, MODERATELY DECREASED GLOMERULAR FILTRATION RATE (GFR) BETWEEN 45-59 ML/MIN/1.73 SQUARE METER AND ALBUMINURIA CREATININE RATIO LESS THAN 30 MG/G (CMS/H* (HCC): ICD-10-CM

## 2021-02-23 DIAGNOSIS — Z72.0 TOBACCO ABUSE: ICD-10-CM

## 2021-02-23 DIAGNOSIS — I10 ESSENTIAL (PRIMARY) HYPERTENSION: Primary | ICD-10-CM

## 2021-02-23 DIAGNOSIS — Z72.0 TOBACCO USER: ICD-10-CM

## 2021-02-23 LAB
25(OH)D3 SERPL-MCNC: 9.4 NG/ML (ref 30–100)
ALBUMIN SERPL-MCNC: 4.2 G/DL (ref 3.5–5.2)
ANION GAP SERPL CALCULATED.3IONS-SCNC: 11.3 MMOL/L (ref 5–15)
ANISOCYTOSIS BLD QL: ABNORMAL
BACTERIA UR QL AUTO: NORMAL /HPF
BASOPHILS # BLD MANUAL: 0.09 10*3/MM3 (ref 0–0.2)
BASOPHILS NFR BLD AUTO: 2.2 % (ref 0–1.5)
BILIRUB UR QL STRIP: NEGATIVE
BUN SERPL-MCNC: 12 MG/DL (ref 8–23)
BUN/CREAT SERPL: 8.7 (ref 7–25)
C3 SERPL-MCNC: 91 MG/DL (ref 82–167)
C4 SERPL-MCNC: 24 MG/DL (ref 14–44)
CALCIUM SPEC-SCNC: 9.4 MG/DL (ref 8.6–10.5)
CHLORIDE SERPL-SCNC: 103 MMOL/L (ref 98–107)
CLARITY UR: CLEAR
CO2 SERPL-SCNC: 18.7 MMOL/L (ref 22–29)
COLOR UR: YELLOW
CREAT SERPL-MCNC: 1.38 MG/DL (ref 0.76–1.27)
CREAT UR-MCNC: 113.2 MG/DL
DEPRECATED RDW RBC AUTO: 43.9 FL (ref 37–54)
EOSINOPHIL # BLD MANUAL: 0.04 10*3/MM3 (ref 0–0.4)
EOSINOPHIL NFR BLD MANUAL: 1.1 % (ref 0.3–6.2)
ERYTHROCYTE [DISTWIDTH] IN BLOOD BY AUTOMATED COUNT: 20 % (ref 12.3–15.4)
GFR SERPL CREATININE-BSD FRML MDRD: 62 ML/MIN/1.73
GLUCOSE SERPL-MCNC: 89 MG/DL (ref 65–99)
GLUCOSE UR STRIP-MCNC: NEGATIVE MG/DL
HBA1C MFR BLD: 5.77 % (ref 4.8–5.6)
HBV SURFACE AG SERPL QL IA: NORMAL
HCT VFR BLD AUTO: 23.3 % (ref 37.5–51)
HCV AB SER DONR QL: NORMAL
HGB BLD-MCNC: 6.8 G/DL (ref 13–17.7)
HGB UR QL STRIP.AUTO: NEGATIVE
HYALINE CASTS UR QL AUTO: NORMAL /LPF
HYPOCHROMIA BLD QL: ABNORMAL
KETONES UR QL STRIP: NEGATIVE
LEUKOCYTE ESTERASE UR QL STRIP.AUTO: NEGATIVE
LYMPHOCYTES # BLD MANUAL: 1.14 10*3/MM3 (ref 0.7–3.1)
LYMPHOCYTES NFR BLD MANUAL: 28.6 % (ref 19.6–45.3)
LYMPHOCYTES NFR BLD MANUAL: 8.8 % (ref 5–12)
MAGNESIUM SERPL-MCNC: 2.1 MG/DL (ref 1.6–2.4)
MCH RBC QN AUTO: 18.8 PG (ref 26.6–33)
MCHC RBC AUTO-ENTMCNC: 29.2 G/DL (ref 31.5–35.7)
MCV RBC AUTO: 64.5 FL (ref 79–97)
MONOCYTES # BLD AUTO: 0.35 10*3/MM3 (ref 0.1–0.9)
NEUTROPHILS # BLD AUTO: 2.37 10*3/MM3 (ref 1.7–7)
NEUTROPHILS NFR BLD MANUAL: 59.3 % (ref 42.7–76)
NITRITE UR QL STRIP: NEGATIVE
PH UR STRIP.AUTO: 5.5 [PH] (ref 5–8)
PHOSPHATE SERPL-MCNC: 3.5 MG/DL (ref 2.5–4.5)
PLAT MORPH BLD: NORMAL
PLATELET # BLD AUTO: 354 10*3/MM3 (ref 140–450)
POIKILOCYTOSIS BLD QL SMEAR: ABNORMAL
POTASSIUM SERPL-SCNC: 4.5 MMOL/L (ref 3.5–5.2)
PROT UR QL STRIP: NEGATIVE
PROT UR-MCNC: 10 MG/DL
PROT/CREAT UR: 88.3 MG/G CREA (ref 0–200)
PTH-INTACT SERPL-MCNC: 65.3 PG/ML (ref 15–65)
RBC # BLD AUTO: 3.61 10*6/MM3 (ref 4.14–5.8)
RBC # UR: NORMAL /HPF
REF LAB TEST METHOD: NORMAL
SODIUM SERPL-SCNC: 133 MMOL/L (ref 136–145)
SP GR UR STRIP: 1.01 (ref 1–1.03)
SQUAMOUS #/AREA URNS HPF: NORMAL /HPF
URATE SERPL-MCNC: 7.2 MG/DL (ref 3.4–7)
UROBILINOGEN UR QL STRIP: NORMAL
WBC # BLD AUTO: 3.99 10*3/MM3 (ref 3.4–10.8)
WBC MORPH BLD: NORMAL
WBC UR QL AUTO: NORMAL /HPF

## 2021-02-23 PROCEDURE — 86160 COMPLEMENT ANTIGEN: CPT | Performed by: NURSE PRACTITIONER

## 2021-02-23 PROCEDURE — 86235 NUCLEAR ANTIGEN ANTIBODY: CPT | Performed by: NURSE PRACTITIONER

## 2021-02-23 PROCEDURE — 85025 COMPLETE CBC W/AUTO DIFF WBC: CPT | Performed by: NURSE PRACTITIONER

## 2021-02-23 PROCEDURE — 86803 HEPATITIS C AB TEST: CPT | Performed by: NURSE PRACTITIONER

## 2021-02-23 PROCEDURE — 83735 ASSAY OF MAGNESIUM: CPT | Performed by: NURSE PRACTITIONER

## 2021-02-23 PROCEDURE — 36415 COLL VENOUS BLD VENIPUNCTURE: CPT | Performed by: NURSE PRACTITIONER

## 2021-02-23 PROCEDURE — 82306 VITAMIN D 25 HYDROXY: CPT | Performed by: NURSE PRACTITIONER

## 2021-02-23 PROCEDURE — 84156 ASSAY OF PROTEIN URINE: CPT | Performed by: NURSE PRACTITIONER

## 2021-02-23 PROCEDURE — 81001 URINALYSIS AUTO W/SCOPE: CPT | Performed by: NURSE PRACTITIONER

## 2021-02-23 PROCEDURE — 84550 ASSAY OF BLOOD/URIC ACID: CPT | Performed by: NURSE PRACTITIONER

## 2021-02-23 PROCEDURE — 83036 HEMOGLOBIN GLYCOSYLATED A1C: CPT | Performed by: NURSE PRACTITIONER

## 2021-02-23 PROCEDURE — 85007 BL SMEAR W/DIFF WBC COUNT: CPT | Performed by: NURSE PRACTITIONER

## 2021-02-23 PROCEDURE — 87340 HEPATITIS B SURFACE AG IA: CPT | Performed by: NURSE PRACTITIONER

## 2021-02-23 PROCEDURE — 80069 RENAL FUNCTION PANEL: CPT | Performed by: NURSE PRACTITIONER

## 2021-02-23 PROCEDURE — 86225 DNA ANTIBODY NATIVE: CPT | Performed by: NURSE PRACTITIONER

## 2021-02-23 PROCEDURE — 83970 ASSAY OF PARATHORMONE: CPT | Performed by: NURSE PRACTITIONER

## 2021-02-23 PROCEDURE — 82570 ASSAY OF URINE CREATININE: CPT | Performed by: NURSE PRACTITIONER

## 2021-02-24 ENCOUNTER — HOSPITAL ENCOUNTER (INPATIENT)
Facility: HOSPITAL | Age: 71
LOS: 1 days | Discharge: HOME | End: 2021-02-25
Attending: EMERGENCY MEDICINE | Admitting: HOSPITALIST

## 2021-02-24 DIAGNOSIS — D50.9 MICROCYTIC ANEMIA: Primary | ICD-10-CM

## 2021-02-24 DIAGNOSIS — N17.9 AKI (ACUTE KIDNEY INJURY) (HCC): ICD-10-CM

## 2021-02-24 PROBLEM — E55.9 VITAMIN D DEFICIENCY: Status: ACTIVE | Noted: 2021-02-24

## 2021-02-24 LAB
ABO GROUP BLD: NORMAL
ACANTHOCYTES BLD QL SMEAR: NORMAL
ALBUMIN SERPL-MCNC: 4 G/DL (ref 3.5–5.2)
ALBUMIN/GLOB SERPL: 1 G/DL
ALP SERPL-CCNC: 71 U/L (ref 39–117)
ALT SERPL W P-5'-P-CCNC: 9 U/L (ref 1–41)
ANION GAP SERPL CALCULATED.3IONS-SCNC: 11 MMOL/L (ref 5–15)
ANISOCYTOSIS BLD QL: NORMAL
APTT PPP: 36.4 SECONDS (ref 20–40.3)
AST SERPL-CCNC: 15 U/L (ref 1–40)
BILIRUB SERPL-MCNC: 0.3 MG/DL (ref 0–1.2)
BLD GP AB SCN SERPL QL: NEGATIVE
BUN SERPL-MCNC: 13 MG/DL (ref 8–23)
BUN/CREAT SERPL: 8.4 (ref 7–25)
CALCIUM SPEC-SCNC: 9 MG/DL (ref 8.6–10.5)
CENTROMERE B AB SER-ACNC: <0.2 AI (ref 0–0.9)
CHLORIDE SERPL-SCNC: 100 MMOL/L (ref 98–107)
CHROMATIN AB SERPL-ACNC: <0.2 AI (ref 0–0.9)
CO2 SERPL-SCNC: 19 MMOL/L (ref 22–29)
CREAT SERPL-MCNC: 1.54 MG/DL (ref 0.76–1.27)
DEPRECATED RDW RBC AUTO: 46.4 FL (ref 37–54)
DSDNA AB SER-ACNC: <1 IU/ML (ref 0–9)
ENA JO1 AB SER-ACNC: <0.2 AI (ref 0–0.9)
ENA RNP AB SER-ACNC: 0.2 AI (ref 0–0.9)
ENA SCL70 AB SER-ACNC: <0.2 AI (ref 0–0.9)
ENA SM AB SER-ACNC: <0.2 AI (ref 0–0.9)
ENA SS-A AB SER-ACNC: 0.3 AI (ref 0–0.9)
ENA SS-B AB SER-ACNC: <0.2 AI (ref 0–0.9)
EOSINOPHIL # BLD MANUAL: 0.04 10*3/MM3 (ref 0–0.4)
EOSINOPHIL NFR BLD MANUAL: 1 % (ref 0.3–6.2)
ERYTHROCYTE [DISTWIDTH] IN BLOOD BY AUTOMATED COUNT: 20.9 % (ref 12.3–15.4)
FERRITIN SERPL-MCNC: 29.08 NG/ML (ref 30–400)
FLUAV RNA RESP QL NAA+PROBE: NOT DETECTED
FLUBV RNA RESP QL NAA+PROBE: NOT DETECTED
GFR SERPL CREATININE-BSD FRML MDRD: 55 ML/MIN/1.73
GLOBULIN UR ELPH-MCNC: 4.1 GM/DL
GLUCOSE SERPL-MCNC: 139 MG/DL (ref 65–99)
HCT VFR BLD AUTO: 22.8 % (ref 37.5–51)
HGB BLD-MCNC: 6.7 G/DL (ref 13–17.7)
HOLD SPECIMEN: NORMAL
HYPOCHROMIA BLD QL: NORMAL
INR PPP: 1 (ref 0.8–1.2)
IRON 24H UR-MRATE: 18 MCG/DL (ref 59–158)
IRON SATN MFR SERPL: 4 % (ref 20–50)
LYMPHOCYTES # BLD MANUAL: 0.84 10*3/MM3 (ref 0.7–3.1)
LYMPHOCYTES NFR BLD MANUAL: 22 % (ref 19.6–45.3)
LYMPHOCYTES NFR BLD MANUAL: 7 % (ref 5–12)
Lab: NORMAL
Lab: NORMAL
MCH RBC QN AUTO: 19 PG (ref 26.6–33)
MCHC RBC AUTO-ENTMCNC: 29.4 G/DL (ref 31.5–35.7)
MCV RBC AUTO: 64.8 FL (ref 79–97)
MONOCYTES # BLD AUTO: 0.27 10*3/MM3 (ref 0.1–0.9)
NEUTROPHILS # BLD AUTO: 2.51 10*3/MM3 (ref 1.7–7)
NEUTROPHILS NFR BLD MANUAL: 66 % (ref 42.7–76)
OVALOCYTES BLD QL SMEAR: NORMAL
PLATELET # BLD AUTO: 381 10*3/MM3 (ref 140–450)
PMV BLD AUTO: 10 FL (ref 6–12)
POTASSIUM SERPL-SCNC: 4.2 MMOL/L (ref 3.5–5.2)
PROT SERPL-MCNC: 8.1 G/DL (ref 6–8.5)
PROTHROMBIN TIME: 13.6 SECONDS (ref 11.1–15.3)
RBC # BLD AUTO: 3.52 10*6/MM3 (ref 4.14–5.8)
RH BLD: POSITIVE
SARS-COV-2 RNA RESP QL NAA+PROBE: NOT DETECTED
SMALL PLATELETS BLD QL SMEAR: ADEQUATE
SODIUM SERPL-SCNC: 130 MMOL/L (ref 136–145)
T&S EXPIRATION DATE: NORMAL
TIBC SERPL-MCNC: 487 MCG/DL (ref 298–536)
TRANSFERRIN SERPL-MCNC: 327 MG/DL (ref 200–360)
VARIANT LYMPHS NFR BLD MANUAL: 4 % (ref 0–5)
WBC # BLD AUTO: 3.8 10*3/MM3 (ref 3.4–10.8)
WBC MORPH BLD: NORMAL
WHOLE BLOOD HOLD SPECIMEN: NORMAL

## 2021-02-24 PROCEDURE — 85610 PROTHROMBIN TIME: CPT | Performed by: EMERGENCY MEDICINE

## 2021-02-24 PROCEDURE — 86901 BLOOD TYPING SEROLOGIC RH(D): CPT

## 2021-02-24 PROCEDURE — 99221 1ST HOSP IP/OBS SF/LOW 40: CPT | Performed by: STUDENT IN AN ORGANIZED HEALTH CARE EDUCATION/TRAINING PROGRAM

## 2021-02-24 PROCEDURE — 87636 SARSCOV2 & INF A&B AMP PRB: CPT | Performed by: EMERGENCY MEDICINE

## 2021-02-24 PROCEDURE — 82607 VITAMIN B-12: CPT | Performed by: STUDENT IN AN ORGANIZED HEALTH CARE EDUCATION/TRAINING PROGRAM

## 2021-02-24 PROCEDURE — P9016 RBC LEUKOCYTES REDUCED: HCPCS

## 2021-02-24 PROCEDURE — 86900 BLOOD TYPING SEROLOGIC ABO: CPT

## 2021-02-24 PROCEDURE — 85730 THROMBOPLASTIN TIME PARTIAL: CPT | Performed by: EMERGENCY MEDICINE

## 2021-02-24 PROCEDURE — 99284 EMERGENCY DEPT VISIT MOD MDM: CPT

## 2021-02-24 PROCEDURE — 86923 COMPATIBILITY TEST ELECTRIC: CPT

## 2021-02-24 PROCEDURE — 80053 COMPREHEN METABOLIC PANEL: CPT | Performed by: EMERGENCY MEDICINE

## 2021-02-24 PROCEDURE — 86850 RBC ANTIBODY SCREEN: CPT | Performed by: EMERGENCY MEDICINE

## 2021-02-24 PROCEDURE — 85025 COMPLETE CBC W/AUTO DIFF WBC: CPT | Performed by: EMERGENCY MEDICINE

## 2021-02-24 PROCEDURE — 86901 BLOOD TYPING SEROLOGIC RH(D): CPT | Performed by: EMERGENCY MEDICINE

## 2021-02-24 PROCEDURE — 86900 BLOOD TYPING SEROLOGIC ABO: CPT | Performed by: EMERGENCY MEDICINE

## 2021-02-24 PROCEDURE — 83540 ASSAY OF IRON: CPT | Performed by: STUDENT IN AN ORGANIZED HEALTH CARE EDUCATION/TRAINING PROGRAM

## 2021-02-24 PROCEDURE — 82746 ASSAY OF FOLIC ACID SERUM: CPT | Performed by: STUDENT IN AN ORGANIZED HEALTH CARE EDUCATION/TRAINING PROGRAM

## 2021-02-24 PROCEDURE — 36430 TRANSFUSION BLD/BLD COMPNT: CPT

## 2021-02-24 PROCEDURE — 85007 BL SMEAR W/DIFF WBC COUNT: CPT | Performed by: EMERGENCY MEDICINE

## 2021-02-24 PROCEDURE — 84466 ASSAY OF TRANSFERRIN: CPT | Performed by: STUDENT IN AN ORGANIZED HEALTH CARE EDUCATION/TRAINING PROGRAM

## 2021-02-24 PROCEDURE — 82728 ASSAY OF FERRITIN: CPT | Performed by: STUDENT IN AN ORGANIZED HEALTH CARE EDUCATION/TRAINING PROGRAM

## 2021-02-24 RX ORDER — SODIUM CHLORIDE 0.9 % (FLUSH) 0.9 %
10 SYRINGE (ML) INJECTION EVERY 12 HOURS SCHEDULED
Status: DISCONTINUED | OUTPATIENT
Start: 2021-02-24 | End: 2021-02-25 | Stop reason: HOSPADM

## 2021-02-24 RX ORDER — CALCIUM CARBONATE 200(500)MG
1 TABLET,CHEWABLE ORAL 2 TIMES DAILY PRN
Status: DISCONTINUED | OUTPATIENT
Start: 2021-02-24 | End: 2021-02-25 | Stop reason: HOSPADM

## 2021-02-24 RX ORDER — MELATONIN
2000 DAILY
Status: DISCONTINUED | OUTPATIENT
Start: 2021-02-25 | End: 2021-02-25 | Stop reason: HOSPADM

## 2021-02-24 RX ORDER — ATORVASTATIN CALCIUM 40 MG/1
80 TABLET, FILM COATED ORAL DAILY
Status: CANCELLED | OUTPATIENT
Start: 2021-02-24

## 2021-02-24 RX ORDER — ONDANSETRON 2 MG/ML
4 INJECTION INTRAMUSCULAR; INTRAVENOUS EVERY 6 HOURS PRN
Status: DISCONTINUED | OUTPATIENT
Start: 2021-02-24 | End: 2021-02-25 | Stop reason: HOSPADM

## 2021-02-24 RX ORDER — SODIUM CHLORIDE 0.9 % (FLUSH) 0.9 %
10 SYRINGE (ML) INJECTION AS NEEDED
Status: DISCONTINUED | OUTPATIENT
Start: 2021-02-24 | End: 2021-02-25 | Stop reason: HOSPADM

## 2021-02-24 RX ORDER — SODIUM CHLORIDE 9 MG/ML
INJECTION, SOLUTION INTRAVENOUS
Status: DISCONTINUED
Start: 2021-02-24 | End: 2021-02-24 | Stop reason: WASHOUT

## 2021-02-24 RX ORDER — ATORVASTATIN CALCIUM 40 MG/1
80 TABLET, FILM COATED ORAL DAILY
Status: DISCONTINUED | OUTPATIENT
Start: 2021-02-25 | End: 2021-02-25 | Stop reason: HOSPADM

## 2021-02-24 RX ORDER — NICOTINE 21 MG/24HR
1 PATCH, TRANSDERMAL 24 HOURS TRANSDERMAL EVERY 24 HOURS
Status: DISCONTINUED | OUTPATIENT
Start: 2021-02-24 | End: 2021-02-25 | Stop reason: HOSPADM

## 2021-02-24 RX ORDER — DILTIAZEM HYDROCHLORIDE 300 MG/1
300 CAPSULE, COATED, EXTENDED RELEASE ORAL DAILY
Status: CANCELLED | OUTPATIENT
Start: 2021-02-24

## 2021-02-24 RX ORDER — ACETAMINOPHEN 160 MG/5ML
650 SOLUTION ORAL EVERY 4 HOURS PRN
Status: DISCONTINUED | OUTPATIENT
Start: 2021-02-24 | End: 2021-02-25 | Stop reason: HOSPADM

## 2021-02-24 RX ORDER — ASPIRIN 81 MG/1
81 TABLET ORAL DAILY
Status: CANCELLED | OUTPATIENT
Start: 2021-02-24

## 2021-02-24 RX ORDER — HYDRALAZINE HYDROCHLORIDE 50 MG/1
50 TABLET, FILM COATED ORAL EVERY 12 HOURS SCHEDULED
Status: CANCELLED | OUTPATIENT
Start: 2021-02-24

## 2021-02-24 RX ORDER — ACETAMINOPHEN 325 MG/1
650 TABLET ORAL EVERY 4 HOURS PRN
Status: DISCONTINUED | OUTPATIENT
Start: 2021-02-24 | End: 2021-02-25 | Stop reason: HOSPADM

## 2021-02-24 RX ORDER — ACETAMINOPHEN 650 MG/1
650 SUPPOSITORY RECTAL EVERY 4 HOURS PRN
Status: DISCONTINUED | OUTPATIENT
Start: 2021-02-24 | End: 2021-02-25 | Stop reason: HOSPADM

## 2021-02-24 RX ORDER — ONDANSETRON 4 MG/1
4 TABLET, FILM COATED ORAL EVERY 6 HOURS PRN
Status: DISCONTINUED | OUTPATIENT
Start: 2021-02-24 | End: 2021-02-25 | Stop reason: HOSPADM

## 2021-02-24 RX ORDER — CLOPIDOGREL BISULFATE 75 MG/1
75 TABLET ORAL DAILY
Status: DISCONTINUED | OUTPATIENT
Start: 2021-02-25 | End: 2021-02-25 | Stop reason: HOSPADM

## 2021-02-24 RX ORDER — CLOPIDOGREL BISULFATE 75 MG/1
75 TABLET ORAL DAILY
Status: CANCELLED | OUTPATIENT
Start: 2021-02-24

## 2021-02-24 RX ADMIN — DILTIAZEM HYDROCHLORIDE 300 MG: 180 CAPSULE, COATED, EXTENDED RELEASE ORAL at 17:48

## 2021-02-24 RX ADMIN — SODIUM CHLORIDE, PRESERVATIVE FREE 10 ML: 5 INJECTION INTRAVENOUS at 21:56

## 2021-02-25 ENCOUNTER — READMISSION MANAGEMENT (OUTPATIENT)
Dept: CALL CENTER | Facility: HOSPITAL | Age: 71
End: 2021-02-25

## 2021-02-25 VITALS
BODY MASS INDEX: 19.05 KG/M2 | WEIGHT: 140.6 LBS | RESPIRATION RATE: 16 BRPM | HEIGHT: 72 IN | DIASTOLIC BLOOD PRESSURE: 60 MMHG | TEMPERATURE: 97 F | HEART RATE: 62 BPM | SYSTOLIC BLOOD PRESSURE: 121 MMHG | OXYGEN SATURATION: 96 %

## 2021-02-25 PROBLEM — D64.9 SYMPTOMATIC ANEMIA: Status: ACTIVE | Noted: 2021-02-25

## 2021-02-25 LAB
ALBUMIN SERPL-MCNC: 3.8 G/DL (ref 3.5–5.2)
ALBUMIN/GLOB SERPL: 1.1 G/DL
ALP SERPL-CCNC: 74 U/L (ref 39–117)
ALT SERPL W P-5'-P-CCNC: 8 U/L (ref 1–41)
ANION GAP SERPL CALCULATED.3IONS-SCNC: 9 MMOL/L (ref 5–15)
AST SERPL-CCNC: 16 U/L (ref 1–40)
BASOPHILS # BLD AUTO: 0.04 10*3/MM3 (ref 0–0.2)
BASOPHILS NFR BLD AUTO: 0.7 % (ref 0–1.5)
BILIRUB SERPL-MCNC: 0.7 MG/DL (ref 0–1.2)
BUN SERPL-MCNC: 15 MG/DL (ref 8–23)
BUN/CREAT SERPL: 10.9 (ref 7–25)
CALCIUM SPEC-SCNC: 9.4 MG/DL (ref 8.6–10.5)
CHLORIDE SERPL-SCNC: 103 MMOL/L (ref 98–107)
CO2 SERPL-SCNC: 20 MMOL/L (ref 22–29)
CREAT SERPL-MCNC: 1.38 MG/DL (ref 0.76–1.27)
DEPRECATED RDW RBC AUTO: 54.4 FL (ref 37–54)
EOSINOPHIL # BLD AUTO: 0.06 10*3/MM3 (ref 0–0.4)
EOSINOPHIL NFR BLD AUTO: 1 % (ref 0.3–6.2)
ERYTHROCYTE [DISTWIDTH] IN BLOOD BY AUTOMATED COUNT: 22.9 % (ref 12.3–15.4)
FOLATE SERPL-MCNC: 11.1 NG/ML (ref 4.78–24.2)
GFR SERPL CREATININE-BSD FRML MDRD: 62 ML/MIN/1.73
GLOBULIN UR ELPH-MCNC: 3.6 GM/DL
GLUCOSE SERPL-MCNC: 89 MG/DL (ref 65–99)
HCT VFR BLD AUTO: 28.1 % (ref 37.5–51)
HCT VFR BLD AUTO: 28.3 % (ref 37.5–51)
HCT VFR BLD AUTO: 29.3 % (ref 37.5–51)
HGB BLD-MCNC: 8.8 G/DL (ref 13–17.7)
HGB BLD-MCNC: 8.8 G/DL (ref 13–17.7)
HGB BLD-MCNC: 9.2 G/DL (ref 13–17.7)
HOLD SPECIMEN: NORMAL
IMM GRANULOCYTES # BLD AUTO: 0.01 10*3/MM3 (ref 0–0.05)
IMM GRANULOCYTES NFR BLD AUTO: 0.2 % (ref 0–0.5)
LYMPHOCYTES # BLD AUTO: 0.99 10*3/MM3 (ref 0.7–3.1)
LYMPHOCYTES NFR BLD AUTO: 16.4 % (ref 19.6–45.3)
MCH RBC QN AUTO: 21.4 PG (ref 26.6–33)
MCHC RBC AUTO-ENTMCNC: 31.1 G/DL (ref 31.5–35.7)
MCV RBC AUTO: 68.7 FL (ref 79–97)
MONOCYTES # BLD AUTO: 0.74 10*3/MM3 (ref 0.1–0.9)
MONOCYTES NFR BLD AUTO: 12.2 % (ref 5–12)
NEUTROPHILS NFR BLD AUTO: 4.21 10*3/MM3 (ref 1.7–7)
NEUTROPHILS NFR BLD AUTO: 69.5 % (ref 42.7–76)
NRBC BLD AUTO-RTO: 0 /100 WBC (ref 0–0.2)
OSMOLALITY SERPL: 288 MOSM/KG (ref 280–290)
OSMOLALITY UR: 316 MOSM/KG (ref 38–1400)
PLATELET # BLD AUTO: 340 10*3/MM3 (ref 140–450)
PMV BLD AUTO: 10.7 FL (ref 6–12)
POTASSIUM SERPL-SCNC: 4.1 MMOL/L (ref 3.5–5.2)
PROT SERPL-MCNC: 7.4 G/DL (ref 6–8.5)
RBC # BLD AUTO: 4.12 10*6/MM3 (ref 4.14–5.8)
SODIUM SERPL-SCNC: 132 MMOL/L (ref 136–145)
SODIUM UR-SCNC: 62 MMOL/L
VIT B12 BLD-MCNC: 305 PG/ML (ref 211–946)
WBC # BLD AUTO: 6.05 10*3/MM3 (ref 3.4–10.8)

## 2021-02-25 PROCEDURE — 85014 HEMATOCRIT: CPT | Performed by: STUDENT IN AN ORGANIZED HEALTH CARE EDUCATION/TRAINING PROGRAM

## 2021-02-25 PROCEDURE — 25010000002 NA FERRIC GLUC CPLX PER 12.5 MG: Performed by: STUDENT IN AN ORGANIZED HEALTH CARE EDUCATION/TRAINING PROGRAM

## 2021-02-25 PROCEDURE — 80053 COMPREHEN METABOLIC PANEL: CPT | Performed by: STUDENT IN AN ORGANIZED HEALTH CARE EDUCATION/TRAINING PROGRAM

## 2021-02-25 PROCEDURE — 84300 ASSAY OF URINE SODIUM: CPT | Performed by: STUDENT IN AN ORGANIZED HEALTH CARE EDUCATION/TRAINING PROGRAM

## 2021-02-25 PROCEDURE — 83935 ASSAY OF URINE OSMOLALITY: CPT | Performed by: STUDENT IN AN ORGANIZED HEALTH CARE EDUCATION/TRAINING PROGRAM

## 2021-02-25 PROCEDURE — 99239 HOSP IP/OBS DSCHRG MGMT >30: CPT | Performed by: STUDENT IN AN ORGANIZED HEALTH CARE EDUCATION/TRAINING PROGRAM

## 2021-02-25 PROCEDURE — 83930 ASSAY OF BLOOD OSMOLALITY: CPT | Performed by: STUDENT IN AN ORGANIZED HEALTH CARE EDUCATION/TRAINING PROGRAM

## 2021-02-25 PROCEDURE — 85018 HEMOGLOBIN: CPT | Performed by: STUDENT IN AN ORGANIZED HEALTH CARE EDUCATION/TRAINING PROGRAM

## 2021-02-25 PROCEDURE — 85025 COMPLETE CBC W/AUTO DIFF WBC: CPT | Performed by: STUDENT IN AN ORGANIZED HEALTH CARE EDUCATION/TRAINING PROGRAM

## 2021-02-25 RX ORDER — HYDRALAZINE HYDROCHLORIDE 50 MG/1
50 TABLET, FILM COATED ORAL EVERY 12 HOURS SCHEDULED
Status: DISCONTINUED | OUTPATIENT
Start: 2021-02-25 | End: 2021-02-25 | Stop reason: HOSPADM

## 2021-02-25 RX ADMIN — ATORVASTATIN CALCIUM 80 MG: 40 TABLET, FILM COATED ORAL at 08:26

## 2021-02-25 RX ADMIN — DILTIAZEM HYDROCHLORIDE 300 MG: 180 CAPSULE, COATED, EXTENDED RELEASE ORAL at 08:26

## 2021-02-25 RX ADMIN — SODIUM CHLORIDE, PRESERVATIVE FREE 10 ML: 5 INJECTION INTRAVENOUS at 08:31

## 2021-02-25 RX ADMIN — Medication 2000 UNITS: at 08:31

## 2021-02-25 RX ADMIN — HYDRALAZINE HYDROCHLORIDE 50 MG: 50 TABLET, FILM COATED ORAL at 10:14

## 2021-02-25 RX ADMIN — CLOPIDOGREL BISULFATE 75 MG: 75 TABLET ORAL at 08:26

## 2021-02-25 RX ADMIN — SODIUM CHLORIDE 125 MG: 9 INJECTION, SOLUTION INTRAVENOUS at 10:14

## 2021-02-25 NOTE — OUTREACH NOTE
Prep Survey      Responses   [facility] facility patient discharged from?  [place]   Is LACE score < 7 ?  No   Emergency Room discharge w/ pulse ox?  No   Eligibility  [facility]   Date of Admission  02/24/21   Date of Discharge  02/25/21   Discharge Disposition  Home or Self Care   Discharge diagnosis  iron deficiency anemia, CKD, HTN   Does the patient have one of the following disease processes/diagnoses(primary or secondary)?  Other   Does the patient have Home health ordered?  No   Is there a DME ordered?  No   Prep survey completed?  Yes          Joslyn Dwyer RN

## 2021-02-26 ENCOUNTER — IMMUNIZATION (OUTPATIENT)
Dept: VACCINE CLINIC | Facility: HOSPITAL | Age: 71
End: 2021-02-26

## 2021-02-26 ENCOUNTER — TRANSITIONAL CARE MANAGEMENT TELEPHONE ENCOUNTER (OUTPATIENT)
Dept: CALL CENTER | Facility: HOSPITAL | Age: 71
End: 2021-02-26

## 2021-02-26 LAB
BH BB BLOOD EXPIRATION DATE: NORMAL
BH BB BLOOD EXPIRATION DATE: NORMAL
BH BB BLOOD TYPE BARCODE: NORMAL
BH BB BLOOD TYPE BARCODE: NORMAL
BH BB DISPENSE STATUS: NORMAL
BH BB DISPENSE STATUS: NORMAL
BH BB PRODUCT CODE: NORMAL
BH BB PRODUCT CODE: NORMAL
BH BB UNIT NUMBER: NORMAL
BH BB UNIT NUMBER: NORMAL
CROSSMATCH INTERPRETATION: NORMAL
CROSSMATCH INTERPRETATION: NORMAL
UNIT  ABO: NORMAL
UNIT  ABO: NORMAL
UNIT  RH: NORMAL
UNIT  RH: NORMAL

## 2021-02-26 PROCEDURE — 91300 HC SARSCOV02 VAC 30MCG/0.3ML IM: CPT | Performed by: THORACIC SURGERY (CARDIOTHORACIC VASCULAR SURGERY)

## 2021-02-26 PROCEDURE — 0001A: CPT | Performed by: THORACIC SURGERY (CARDIOTHORACIC VASCULAR SURGERY)

## 2021-02-26 NOTE — OUTREACH NOTE
Call Center TCM Note      Responses   [facility] patient discharged from?  [place]   Does the patient have one of the following disease processes/diagnoses(primary or secondary)?  Other   TCM attempt successful?  Yes   Call start time  1105   Call end time  1108   Discharge diagnosis  iron deficiency anemia, CKD, HTN   Meds reviewed with patient/caregiver?  Yes   Does the patient have all medications ordered at discharge?  N/A   Does the patient have a primary care provider?   Yes   Does the patient have an appointment with their PCP within 7 days of discharge?  Yes   Comments regarding PCP  APPOINTMENT IS ALREADY SCHEDULED FOR 3/1/21   Has the patient kept scheduled appointments due by today?  N/A   Has home health visited the patient within 72 hours of discharge?  N/A   Psychosocial issues?  No   Did the patient receive a copy of their discharge instructions?  Yes   Nursing interventions  Reviewed instructions with patient   What is the patient's perception of their health status since discharge?  Improving   Is the patient/caregiver able to teach back signs and symptoms related to disease process for when to call PCP?  Yes   Is the patient/caregiver able to teach back signs and symptoms related to disease process for when to call 911?  Yes   Is the patient/caregiver able to teach back the hierarchy of who to call/visit for symptoms/problems? PCP, Specialist, Home health nurse, Urgent Care, ED, 911  Yes   If the patient is a current smoker, are they able to teach back resources for cessation?  Smoking cessation support groups   TCM call completed?  Yes          Yadira Huff LPN    2/26/2021, 11:15 EST

## 2021-03-01 ENCOUNTER — OFFICE VISIT (OUTPATIENT)
Dept: FAMILY MEDICINE CLINIC | Facility: CLINIC | Age: 71
End: 2021-03-01

## 2021-03-01 VITALS
TEMPERATURE: 98.6 F | DIASTOLIC BLOOD PRESSURE: 68 MMHG | SYSTOLIC BLOOD PRESSURE: 126 MMHG | HEART RATE: 65 BPM | OXYGEN SATURATION: 97 % | WEIGHT: 145.8 LBS | BODY MASS INDEX: 19.75 KG/M2 | HEIGHT: 72 IN

## 2021-03-01 DIAGNOSIS — Z72.0 TOBACCO USE: ICD-10-CM

## 2021-03-01 DIAGNOSIS — D50.9 IRON DEFICIENCY ANEMIA, UNSPECIFIED IRON DEFICIENCY ANEMIA TYPE: ICD-10-CM

## 2021-03-01 DIAGNOSIS — Z09 HOSPITAL DISCHARGE FOLLOW-UP: Primary | ICD-10-CM

## 2021-03-01 PROBLEM — D64.9 SYMPTOMATIC ANEMIA: Status: RESOLVED | Noted: 2021-02-25 | Resolved: 2021-03-01

## 2021-03-01 PROCEDURE — 99495 TRANSJ CARE MGMT MOD F2F 14D: CPT | Performed by: STUDENT IN AN ORGANIZED HEALTH CARE EDUCATION/TRAINING PROGRAM

## 2021-03-01 RX ORDER — FERROUS SULFATE 324(65)MG
324 TABLET, DELAYED RELEASE (ENTERIC COATED) ORAL
Qty: 30 TABLET | Refills: 5 | Status: SHIPPED | OUTPATIENT
Start: 2021-03-01 | End: 2023-01-27

## 2021-03-01 NOTE — PROGRESS NOTES
Transitional Care Follow Up Visit  Subjective     Dwight Ryder is a 70 y.o. male who presents for a transitional care management visit.    Within 48 business hours after discharge our office contacted him via telephone to coordinate his care and needs.      I reviewed and discussed the details of that call along with the discharge summary, hospital problems, inpatient lab results, inpatient diagnostic studies, and consultation reports with Dwight.     Current outpatient and discharge medications have been reconciled for the patient.  Reviewed by: Stephanie Mai MD      Date of TCM Phone Call 2/25/2021   [facility]   Date of Admission 2/24/2021   Date of Discharge 2/25/2021   Discharge Disposition Home or Self Care     Risk for Readmission (LACE) Score: 8 (2/25/2021  5:00 AM)      History of Present Illness   Course During Hospital Stay: Patient admitted from 2/24-2/25 for anemia.  Hemoglobin 6.7 on admission.  Improved to 8.8 after 2 units PRBC.  Iron panel showed iron deficiency with iron 18 and 4% sat.  Likely a mixed picture of anemia of chronic disease related to his chronic kidney disease.  Received IV iron while admitted as well.  Since discharge, patient has remained stable.  Has not had any shortness of air, fatigue, palpitations, dizziness.  He has regular bowel movements and never noticed any blood in the stool.  He has a follow-up with nephrology on 3/3.  Continues to smoke tobacco reports he has decreased from 2 packs a day to 1.5 packs/day.     The following portions of the patient's history were reviewed and updated as appropriate: allergies, current medications, past family history, past medical history, past social history, past surgical history and problem list.    Review of Systems   Constitutional: Negative for activity change, appetite change, chills, fatigue and fever.   HENT: Negative for congestion, rhinorrhea, sinus pain and sore throat.    Eyes: Negative for pain, redness  and visual disturbance.   Respiratory: Negative for cough, shortness of breath and wheezing.    Cardiovascular: Negative for chest pain, palpitations and leg swelling.   Gastrointestinal: Negative for abdominal pain, constipation, diarrhea, nausea and vomiting.   Genitourinary: Negative for dysuria and flank pain.   Musculoskeletal: Negative for arthralgias, joint swelling and myalgias.   Skin: Negative for color change, pallor and rash.   Neurological: Negative for dizziness, numbness and headaches.   Psychiatric/Behavioral: Negative for dysphoric mood and sleep disturbance. The patient is not nervous/anxious.        Objective   Physical Exam  Constitutional:       General: He is not in acute distress.     Appearance: Normal appearance. He is well-developed. He is not diaphoretic.   HENT:      Head: Normocephalic and atraumatic.      Right Ear: Hearing normal.      Left Ear: Hearing normal.   Eyes:      General: Lids are normal.      Conjunctiva/sclera: Conjunctivae normal.   Cardiovascular:      Rate and Rhythm: Normal rate and regular rhythm.      Heart sounds: Normal heart sounds, S1 normal and S2 normal. No murmur.   Pulmonary:      Effort: Pulmonary effort is normal. No respiratory distress.      Breath sounds: Normal breath sounds. No wheezing or rales.   Abdominal:      General: Bowel sounds are normal. There is no distension.      Palpations: Abdomen is soft.      Tenderness: There is no abdominal tenderness. There is no guarding.   Musculoskeletal: Normal range of motion.         General: No tenderness or deformity.   Skin:     General: Skin is warm and dry.      Coloration: Skin is not pale.      Findings: No erythema or rash.   Neurological:      Mental Status: He is alert and oriented to person, place, and time. He is not disoriented.   Psychiatric:         Speech: Speech normal.         Behavior: Behavior normal.         Assessment/Plan   Diagnoses and all orders for this visit:    1. [facility]  discharge follow-up (Primary)    2. Iron deficiency anemia, unspecified iron deficiency anemia type    3. Tobacco use    Other orders  -     ferrous sulfate 324 MG tablet delayed-release; Take 1 tablet by mouth Daily With Breakfast.  Dispense: 30 tablet; Refill: 5    Patient doing well since discharge.  Continue with iron supplementation.  Discussed that constipation can occur.  Patient to take MiraLAX or Senokot if this develops.  Keep follow-up appointment with nephrology on 3/3.    Patient continues to smoke.  Previously smoking 2 packs/day but reports he has cut down to 1.5 packs/day.  Not currently interested in cessation options.  Wants to wean down by himself.  Patient to notify us if he becomes interested in any options.    Not currently interested in shingles or pneumonia vaccine.  Has received the first dose of Covid vaccine and has follow-up dose on 3/19.      Stephanie Mai M.D. PGY2  [facility]  [address] [place] [address]  Office: [phone]  This document has been electronically signed by Stephanie Mai MD on March 1, 2021 13:52 CST

## 2021-03-01 NOTE — PROGRESS NOTES
Subjective:     Dwight Ryder is a 70 y.o. male who presents for hospital follow up. Patient was admitted for anemia with a hemoglobin A1C of 6.7. He received 2 units of PRBCs and had received IV iron which helped to improve his blood counts. Patient's symptoms have improved after hospital discharge.     The following portions of the patient's history were reviewed and updated as appropriate: allergies, current medications, past family history, past medical history, past social history, past surgical history and problem list.    Past Medical Hx:  Past Medical History:   Diagnosis Date   • Artificial lens present     right   • Astigmatism     myopic   • Atherosclerosis of native arteries of extremity with intermittent claudication (CMS/HCC)    • Benign essential hypertension    • Benign prostatic hyperplasia    • Cataract     R>L   • Corneal foreign body     Left eye, 2 years ago      • Essential hypertension    • Examination     individual health   • Exanthematous disorder    • Fracture of foot    • Hypercholesterolemia    • Hyperkalemia    • Hyperlipidemia    • Hypertensive disorder    • Male erectile disorder    • Need for vaccination    • Peripheral vascular disease (CMS/HCC)     Post RIGHT fem-pop bypass graft 1/7/13 Post thrombolysis to graft 12/15/14      • Peripheral vascular disease (CMS/HCC)     unspecified   • Posterior subcapsular polar senile cataract    • Surgical follow-up care     R fem->pop bypass 1/7/13      • Tobacco dependence syndrome    • Tobacco user    • Urticaria     will give kenalog and benadyl , will order allery test      • Visual discomfort        Past Surgical Hx:  Past Surgical History:   Procedure Laterality Date   • ANGIOPLASTY      femoral-popliteal artery (dilation) (Abdominal aortogram, bilateral lower extremity runoff. Repair of left common femoral artery.)   11/26/2012    • FEMORAL POPLITEAL BYPASS      Right with 6 mm Huntley-baron graft)   01/07/2013    • INJECTION OF  MEDICATION  03/04/2013    kenalog(3)    • OTHER SURGICAL HISTORY      Remove cataract, insert lens (Cataract extraction with intraocular lens implantation, right eye.)   11/26/2013        Health Maintenance:  Health Maintenance   Topic Date Due   • LUNG CANCER SCREENING  08/13/2021 (Originally 10/23/2007)   • LIPID PANEL  08/26/2021 (Originally 1/9/2019)   • ZOSTER VACCINE (2 of 2) 11/23/2021 (Originally 11/9/2016)   • INFLUENZA VACCINE  03/01/2022 (Originally 8/1/2020)   • Pneumococcal Vaccine 65+ (1 of 1 - PPSV23) 03/01/2022 (Originally 10/18/2018)   • COVID-19 Vaccine (2 of 2 - Pfizer series) 03/19/2021   • ANNUAL WELLNESS VISIT  08/13/2021   • COLONOSCOPY  09/14/2026   • TDAP/TD VACCINES (2 - Td) 09/14/2026   • HEPATITIS C SCREENING  Completed   • AAA SCREEN (ONE-TIME)  Completed   • MENINGOCOCCAL VACCINE  Aged Out       Current Meds:    Current Outpatient Medications:   •  aspirin 81 MG EC tablet, Take 1 tablet by mouth Daily., Disp: 30 tablet, Rfl: 1  •  atorvastatin (LIPITOR) 80 MG tablet, TAKE 1 TABLET BY MOUTH DAILY, Disp: 30 tablet, Rfl: 5  •  clopidogrel (PLAVIX) 75 MG tablet, TAKE 1 TABLET BY MOUTH DAILY, Disp: 30 tablet, Rfl: 5  •  dilTIAZem CD (CARDIZEM CD) 300 MG 24 hr capsule, TAKE 1 CAPSULE BY MOUTH DAILY, Disp: 30 capsule, Rfl: 5  •  hydrALAZINE (APRESOLINE) 50 MG tablet, TAKE 1 TABLET BY MOUTH TWICE DAILY (Patient taking differently: Take 50 mg by mouth 2 (two) times a day.), Disp: 180 tablet, Rfl: 2  •  ferrous sulfate 324 MG tablet delayed-release, Take 1 tablet by mouth Daily With Breakfast., Disp: 30 tablet, Rfl: 5    Allergies:  Lisinopril    Family Hx:  Family History   Problem Relation Age of Onset   • Cancer Other    • Heart disease Other    • Hypertension Other    • Stroke Other         Social History:  Social History     Socioeconomic History   • Marital status:      Spouse name: Not on file   • Number of children: Not on file   • Years of education: Not on file   • Highest  "education level: Not on file   Tobacco Use   • Smoking status: Current Every Day Smoker     Packs/day: 2.00     Years: 30.00     Pack years: 60.00     Types: Cigarettes   • Smokeless tobacco: Never Used   Substance and Sexual Activity   • Alcohol use: Yes     Alcohol/week: 6.0 standard drinks     Types: 6 Cans of beer per week     Comment: daily   • Drug use: No   • Sexual activity: Defer       Review of Systems  Review of Systems   Constitutional: Negative for chills and fever.   Respiratory: Negative for shortness of breath.    Cardiovascular: Negative for chest pain.   Gastrointestinal: Negative for abdominal pain, diarrhea, nausea and vomiting.   Genitourinary: Negative for dysuria.   Neurological: Negative for dizziness.       Objective:     /68   Pulse 65   Temp 98.6 °F (37 °C)   Ht 182.9 cm (72\")   Wt 66.1 kg (145 lb 12.8 oz)   SpO2 97%   BMI 19.77 kg/m²   Physical Exam  Vitals signs reviewed.   Constitutional:       Appearance: He is well-developed.   Cardiovascular:      Rate and Rhythm: Normal rate and regular rhythm.      Heart sounds: Normal heart sounds. No murmur. No friction rub. No gallop.    Pulmonary:      Effort: Pulmonary effort is normal. No respiratory distress.      Breath sounds: Normal breath sounds. No wheezing or rales.   Abdominal:      General: Bowel sounds are normal.      Palpations: Abdomen is soft.      Tenderness: There is no abdominal tenderness.   Musculoskeletal: Normal range of motion.   Skin:     General: Skin is warm and dry.   Neurological:      Mental Status: He is alert and oriented to person, place, and time.   Psychiatric:         Behavior: Behavior normal.         Lab Review  Results for orders placed or performed during the hospital encounter of 02/24/21   COVID-19 and FLU A/B PCR - Swab, Nasopharynx    Specimen: Nasopharynx; Swab   Result Value Ref Range    COVID19 Not Detected Not Detected - Ref. Range    Influenza A PCR Not Detected Not Detected    " Influenza B PCR Not Detected Not Detected   Comprehensive Metabolic Panel    Specimen: Blood   Result Value Ref Range    Glucose 139 (H) 65 - 99 mg/dL    BUN 13 8 - 23 mg/dL    Creatinine 1.54 (H) 0.76 - 1.27 mg/dL    Sodium 130 (L) 136 - 145 mmol/L    Potassium 4.2 3.5 - 5.2 mmol/L    Chloride 100 98 - 107 mmol/L    CO2 19.0 (L) 22.0 - 29.0 mmol/L    Calcium 9.0 8.6 - 10.5 mg/dL    Total Protein 8.1 6.0 - 8.5 g/dL    Albumin 4.00 3.50 - 5.20 g/dL    ALT (SGPT) 9 1 - 41 U/L    AST (SGOT) 15 1 - 40 U/L    Alkaline Phosphatase 71 39 - 117 U/L    Total Bilirubin 0.3 0.0 - 1.2 mg/dL    eGFR  African Amer 55 (L) >60 mL/min/1.73    Globulin 4.1 gm/dL    A/G Ratio 1.0 g/dL    BUN/Creatinine Ratio 8.4 7.0 - 25.0    Anion Gap 11.0 5.0 - 15.0 mmol/L   Protime-INR    Specimen: Blood   Result Value Ref Range    Protime 13.6 11.1 - 15.3 Seconds    INR 1.00 0.80 - 1.20   aPTT    Specimen: Blood   Result Value Ref Range    PTT 36.4 20.0 - 40.3 seconds   CBC Auto Differential    Specimen: Blood   Result Value Ref Range    WBC 3.80 3.40 - 10.80 10*3/mm3    RBC 3.52 (L) 4.14 - 5.80 10*6/mm3    Hemoglobin 6.7 (C) 13.0 - 17.7 g/dL    Hematocrit 22.8 (L) 37.5 - 51.0 %    MCV 64.8 (L) 79.0 - 97.0 fL    MCH 19.0 (L) 26.6 - 33.0 pg    MCHC 29.4 (L) 31.5 - 35.7 g/dL    RDW 20.9 (H) 12.3 - 15.4 %    RDW-SD 46.4 37.0 - 54.0 fl    MPV 10.0 6.0 - 12.0 fL    Platelets 381 140 - 450 10*3/mm3   Manual Differential    Specimen: Blood   Result Value Ref Range    Neutrophil % 66.0 42.7 - 76.0 %    Lymphocyte % 22.0 19.6 - 45.3 %    Monocyte % 7.0 5.0 - 12.0 %    Eosinophil % 1.0 0.3 - 6.2 %    Atypical Lymphocyte % 4.0 0.0 - 5.0 %    Neutrophils Absolute 2.51 1.70 - 7.00 10*3/mm3    Lymphocytes Absolute 0.84 0.70 - 3.10 10*3/mm3    Monocytes Absolute 0.27 0.10 - 0.90 10*3/mm3    Eosinophils Absolute 0.04 0.00 - 0.40 10*3/mm3    Acanthocytes Slight/1+ None Seen    Anisocytosis Large/3+ None Seen    Hypochromia Slight/1+ None Seen    Ovalocytes Mod/2+  None Seen    WBC Morphology Normal Normal    Platelet Estimate Adequate Normal   Iron Profile    Specimen: Blood   Result Value Ref Range    Iron 18 (L) 59 - 158 mcg/dL    Iron Saturation 4 (L) 20 - 50 %    Transferrin 327 200 - 360 mg/dL    TIBC 487 298 - 536 mcg/dL   Folate    Specimen: Blood   Result Value Ref Range    Folate 11.10 4.78 - 24.20 ng/mL   Vitamin B12    Specimen: Blood   Result Value Ref Range    Vitamin B-12 305 211 - 946 pg/mL   Ferritin    Specimen: Blood   Result Value Ref Range    Ferritin 29.08 (L) 30.00 - 400.00 ng/mL   Comprehensive Metabolic Panel    Specimen: Blood   Result Value Ref Range    Glucose 89 65 - 99 mg/dL    BUN 15 8 - 23 mg/dL    Creatinine 1.38 (H) 0.76 - 1.27 mg/dL    Sodium 132 (L) 136 - 145 mmol/L    Potassium 4.1 3.5 - 5.2 mmol/L    Chloride 103 98 - 107 mmol/L    CO2 20.0 (L) 22.0 - 29.0 mmol/L    Calcium 9.4 8.6 - 10.5 mg/dL    Total Protein 7.4 6.0 - 8.5 g/dL    Albumin 3.80 3.50 - 5.20 g/dL    ALT (SGPT) 8 1 - 41 U/L    AST (SGOT) 16 1 - 40 U/L    Alkaline Phosphatase 74 39 - 117 U/L    Total Bilirubin 0.7 0.0 - 1.2 mg/dL    eGFR  African Amer 62 >60 mL/min/1.73    Globulin 3.6 gm/dL    A/G Ratio 1.1 g/dL    BUN/Creatinine Ratio 10.9 7.0 - 25.0    Anion Gap 9.0 5.0 - 15.0 mmol/L   CBC Auto Differential    Specimen: Blood   Result Value Ref Range    WBC 6.05 3.40 - 10.80 10*3/mm3    RBC 4.12 (L) 4.14 - 5.80 10*6/mm3    Hemoglobin 8.8 (L) 13.0 - 17.7 g/dL    Hematocrit 28.3 (L) 37.5 - 51.0 %    MCV 68.7 (L) 79.0 - 97.0 fL    MCH 21.4 (L) 26.6 - 33.0 pg    MCHC 31.1 (L) 31.5 - 35.7 g/dL    RDW 22.9 (H) 12.3 - 15.4 %    RDW-SD 54.4 (H) 37.0 - 54.0 fl    MPV 10.7 6.0 - 12.0 fL    Platelets 340 140 - 450 10*3/mm3    Neutrophil % 69.5 42.7 - 76.0 %    Lymphocyte % 16.4 (L) 19.6 - 45.3 %    Monocyte % 12.2 (H) 5.0 - 12.0 %    Eosinophil % 1.0 0.3 - 6.2 %    Basophil % 0.7 0.0 - 1.5 %    Immature Grans % 0.2 0.0 - 0.5 %    Neutrophils, Absolute 4.21 1.70 - 7.00 10*3/mm3     Lymphocytes, Absolute 0.99 0.70 - 3.10 10*3/mm3    Monocytes, Absolute 0.74 0.10 - 0.90 10*3/mm3    Eosinophils, Absolute 0.06 0.00 - 0.40 10*3/mm3    Basophils, Absolute 0.04 0.00 - 0.20 10*3/mm3    Immature Grans, Absolute 0.01 0.00 - 0.05 10*3/mm3    nRBC 0.0 0.0 - 0.2 /100 WBC   Hemoglobin & Hematocrit, Blood    Specimen: Blood   Result Value Ref Range    Hemoglobin 8.8 (L) 13.0 - 17.7 g/dL    Hematocrit 28.1 (L) 37.5 - 51.0 %   Sodium, Urine, Random - Urine, Clean Catch    Specimen: Urine, Clean Catch   Result Value Ref Range    Sodium, Urine 62 mmol/L   Osmolality, Urine - Urine, Clean Catch    Specimen: Urine, Clean Catch   Result Value Ref Range    Osmolality, Urine 316 38-1,400 mOsm/kg   Osmolality, Serum    Specimen: Blood   Result Value Ref Range    Osmolality 288 280 - 290 mOsm/kg   Hemoglobin & Hematocrit, Blood    Specimen: Blood   Result Value Ref Range    Hemoglobin 9.2 (L) 13.0 - 17.7 g/dL    Hematocrit 29.3 (L) 37.5 - 51.0 %   Prepare RBC, 2 Units   Result Value Ref Range    Product Code D0296Z56     Unit Number [id number]     UNIT  ABO B     UNIT  RH POS     Crossmatch Interpretation Compatible     Dispense Status PT     Blood Expiration Date [id number]     Blood Type Barcode     Type & Screen    Specimen: Blood   Result Value Ref Range    ABO Type B     RH type Positive     Antibody Screen Negative     T&S Expiration Date 2/27/2021 11:59:59 PM    PREVIOUS HISTORY    Specimen: Blood   Result Value Ref Range    Previous History No record on File    Prepare RBC, 2 Units   Result Value Ref Range    Product Code J2557U94     Unit Number [id number]     UNIT  ABO B     UNIT  RH POS     Crossmatch Interpretation Compatible     Dispense Status PT     Blood Expiration Date [id number]     Blood Type Barcode     Lavender Top   Result Value Ref Range    Extra Tube hold for add-on    Gold Top - SST   Result Value Ref Range    Extra Tube Hold for add-ons.    Green Top (Gel)   Result Value Ref  Range    Extra Tube Hold for add-ons.        none     Assessment:     Diagnoses and all orders for this visit:    1. Hospital discharge follow-up (Primary)    2. Iron deficiency anemia, unspecified iron deficiency anemia type    3. Tobacco use    Other orders  -     ferrous sulfate 324 MG tablet delayed-release; Take 1 tablet by mouth Daily With Breakfast.  Dispense: 30 tablet; Refill: 5        Plan:     I have seen and examined the patient.  I have reviewed the notes, assessments, and/or procedures performed by Dr. Mia during the office visit.  I concur with her/his documentation and assessment and plan for Dwight Rdyer.

## 2021-03-02 ENCOUNTER — LAB (OUTPATIENT)
Dept: LAB | Facility: HOSPITAL | Age: 71
End: 2021-03-02

## 2021-03-02 ENCOUNTER — APPOINTMENT (OUTPATIENT)
Dept: PREADMISSION TESTING | Facility: HOSPITAL | Age: 71
End: 2021-03-02

## 2021-03-02 DIAGNOSIS — Z01.818 PREOP TESTING: Primary | ICD-10-CM

## 2021-03-02 DIAGNOSIS — I65.23 CAROTID STENOSIS, ASYMPTOMATIC, BILATERAL: ICD-10-CM

## 2021-03-02 LAB
ABO GROUP BLD: NORMAL
BLD GP AB SCN SERPL QL: NEGATIVE
DEPRECATED RDW RBC AUTO: 58.8 FL (ref 37–54)
ERYTHROCYTE [DISTWIDTH] IN BLOOD BY AUTOMATED COUNT: 24.2 % (ref 12.3–15.4)
HCT VFR BLD AUTO: 31.3 % (ref 37.5–51)
HGB BLD-MCNC: 9.6 G/DL (ref 13–17.7)
Lab: NORMAL
MCH RBC QN AUTO: 21.6 PG (ref 26.6–33)
MCHC RBC AUTO-ENTMCNC: 30.7 G/DL (ref 31.5–35.7)
MCV RBC AUTO: 70.3 FL (ref 79–97)
PLATELET # BLD AUTO: 341 10*3/MM3 (ref 140–450)
PMV BLD AUTO: 10.2 FL (ref 6–12)
RBC # BLD AUTO: 4.45 10*6/MM3 (ref 4.14–5.8)
RH BLD: POSITIVE
SARS-COV-2 ORF1AB RESP QL NAA+PROBE: NOT DETECTED
T&S EXPIRATION DATE: NORMAL
WBC # BLD AUTO: 4.9 10*3/MM3 (ref 3.4–10.8)

## 2021-03-02 PROCEDURE — 36415 COLL VENOUS BLD VENIPUNCTURE: CPT

## 2021-03-02 PROCEDURE — 93005 ELECTROCARDIOGRAM TRACING: CPT

## 2021-03-02 PROCEDURE — 86900 BLOOD TYPING SEROLOGIC ABO: CPT

## 2021-03-02 PROCEDURE — 85027 COMPLETE CBC AUTOMATED: CPT

## 2021-03-02 PROCEDURE — U0004 COV-19 TEST NON-CDC HGH THRU: HCPCS

## 2021-03-02 PROCEDURE — 93010 ELECTROCARDIOGRAM REPORT: CPT | Performed by: INTERNAL MEDICINE

## 2021-03-02 PROCEDURE — U0005 INFEC AGEN DETEC AMPLI PROBE: HCPCS

## 2021-03-02 PROCEDURE — 86850 RBC ANTIBODY SCREEN: CPT

## 2021-03-02 PROCEDURE — C9803 HOPD COVID-19 SPEC COLLECT: HCPCS

## 2021-03-02 PROCEDURE — 86901 BLOOD TYPING SEROLOGIC RH(D): CPT

## 2021-03-02 NOTE — DISCHARGE INSTRUCTIONS
[facility]  Pre-op Information and Guidelines    You will be called after 2 p.m. the day before your surgery (Friday for Monday surgery) and notified of your time for arrival and approximate surgery time.  If you have not received a call by 4P.M., please contact Same Day Surgery at [phone] of if outside [facility] call [phone].    Please Follow these Important Safety Guidelines:    • The morning of your procedure, take only the medications listed below with   A sip of water:_____________________________________________       ______________________________________________    • DO NOT eat or drink anything after 12:00 midnight the night before surgery  Specific instructions concerning drinking clear liquids will be discussed during  the pre-surgery instruction call the day before your surgery.    • If you take a blood thinner (ex. Plavix, Coumadin, aspirin), ask your doctor when to stop it before surgery  STOP DATE: _________________    • Only 2 visitors are allowed in patient rooms at a time  Your visitors will be asked to wait in the lobby until the admission process is complete with the exception of a parent with a child and patients in need of special assistance.    • YOU CANNOT DRIVE YOURSELF HOME  You must be accompanied by someone who will be responsible for driving you home after surgery and for your care at home.    • DO NOT chew gum, use breath mints, hard candy, or smoke the day of surgery  • DO NOT drink alcohol for at least 24 hours before your surgery  • DO NOT wear any jewelry and remove all body piercing before coming to the hospital  • DO NOT wear make-up to the hospital  • If you are having surgery on an extremity (arm/leg/foot) remove nail polish/artificial nails on the surgical side  • Clothing, glasses, contacts, dentures, and hairpieces must be removed before surgery  • Bathe the night before or the morning of your surgery and do not use powders/lotions on  skin.

## 2021-03-04 ENCOUNTER — ANESTHESIA EVENT (OUTPATIENT)
Dept: PERIOP | Facility: HOSPITAL | Age: 71
End: 2021-03-04

## 2021-03-05 ENCOUNTER — APPOINTMENT (OUTPATIENT)
Dept: GENERAL RADIOLOGY | Facility: HOSPITAL | Age: 71
End: 2021-03-05

## 2021-03-05 ENCOUNTER — ANESTHESIA (OUTPATIENT)
Dept: PERIOP | Facility: HOSPITAL | Age: 71
End: 2021-03-05

## 2021-03-05 ENCOUNTER — HOSPITAL ENCOUNTER (INPATIENT)
Facility: HOSPITAL | Age: 71
LOS: 1 days | Discharge: HOME | End: 2021-03-06
Attending: THORACIC SURGERY (CARDIOTHORACIC VASCULAR SURGERY) | Admitting: THORACIC SURGERY (CARDIOTHORACIC VASCULAR SURGERY)

## 2021-03-05 DIAGNOSIS — I65.23 CAROTID STENOSIS, ASYMPTOMATIC, BILATERAL: ICD-10-CM

## 2021-03-05 PROCEDURE — 94799 UNLISTED PULMONARY SVC/PX: CPT

## 2021-03-05 PROCEDURE — 25010000002 HEPARIN (PORCINE) PER 1000 UNITS: Performed by: THORACIC SURGERY (CARDIOTHORACIC VASCULAR SURGERY)

## 2021-03-05 PROCEDURE — 25010000002 IOPAMIDOL 61 % SOLUTION: Performed by: THORACIC SURGERY (CARDIOTHORACIC VASCULAR SURGERY)

## 2021-03-05 PROCEDURE — C1876 STENT, NON-COA/NON-COV W/DEL: HCPCS | Performed by: THORACIC SURGERY (CARDIOTHORACIC VASCULAR SURGERY)

## 2021-03-05 PROCEDURE — 25010000002 NEOSTIGMINE 4 MG/4ML SOLUTION PREFILLED SYRINGE: Performed by: NURSE ANESTHETIST, CERTIFIED REGISTERED

## 2021-03-05 PROCEDURE — 25010000002 PHENYLEPHRINE PER 1 ML: Performed by: NURSE ANESTHETIST, CERTIFIED REGISTERED

## 2021-03-05 PROCEDURE — 25010000002 PROPOFOL 10 MG/ML EMULSION: Performed by: NURSE ANESTHETIST, CERTIFIED REGISTERED

## 2021-03-05 PROCEDURE — C1725 CATH, TRANSLUMIN NON-LASER: HCPCS | Performed by: THORACIC SURGERY (CARDIOTHORACIC VASCULAR SURGERY)

## 2021-03-05 PROCEDURE — 25010000002 PROTAMINE SULFATE PER 10 MG: Performed by: NURSE ANESTHETIST, CERTIFIED REGISTERED

## 2021-03-05 PROCEDURE — C1769 GUIDE WIRE: HCPCS | Performed by: THORACIC SURGERY (CARDIOTHORACIC VASCULAR SURGERY)

## 2021-03-05 PROCEDURE — 25010000002 HEPARIN (PORCINE) PER 1000 UNITS: Performed by: NURSE ANESTHETIST, CERTIFIED REGISTERED

## 2021-03-05 PROCEDURE — S0260 H&P FOR SURGERY: HCPCS | Performed by: THORACIC SURGERY (CARDIOTHORACIC VASCULAR SURGERY)

## 2021-03-05 PROCEDURE — C1894 INTRO/SHEATH, NON-LASER: HCPCS | Performed by: THORACIC SURGERY (CARDIOTHORACIC VASCULAR SURGERY)

## 2021-03-05 PROCEDURE — 25010000002 FENTANYL CITRATE (PF) 100 MCG/2ML SOLUTION: Performed by: NURSE ANESTHETIST, CERTIFIED REGISTERED

## 2021-03-05 PROCEDURE — 25010000002 MIDAZOLAM PER 1 MG: Performed by: NURSE ANESTHETIST, CERTIFIED REGISTERED

## 2021-03-05 PROCEDURE — 25010000002 SUCCINYLCHOLINE PER 20 MG: Performed by: NURSE ANESTHETIST, CERTIFIED REGISTERED

## 2021-03-05 PROCEDURE — 76000 FLUOROSCOPY <1 HR PHYS/QHP: CPT

## 2021-03-05 PROCEDURE — 037K3DZ DILATION OF RIGHT INTERNAL CAROTID ARTERY WITH INTRALUMINAL DEVICE, PERCUTANEOUS APPROACH: ICD-10-PCS | Performed by: THORACIC SURGERY (CARDIOTHORACIC VASCULAR SURGERY)

## 2021-03-05 PROCEDURE — 25010000002 CEFAZOLIN PER 500 MG: Performed by: THORACIC SURGERY (CARDIOTHORACIC VASCULAR SURGERY)

## 2021-03-05 PROCEDURE — 37215 TRANSCATH STENT CCA W/EPS: CPT | Performed by: THORACIC SURGERY (CARDIOTHORACIC VASCULAR SURGERY)

## 2021-03-05 PROCEDURE — 37215 TRANSCATH STENT CCA W/EPS: CPT | Performed by: PHYSICIAN ASSISTANT

## 2021-03-05 PROCEDURE — 94760 N-INVAS EAR/PLS OXIMETRY 1: CPT

## 2021-03-05 PROCEDURE — B3161ZZ FLUOROSCOPY OF RIGHT INTERNAL CAROTID ARTERY USING LOW OSMOLAR CONTRAST: ICD-10-PCS | Performed by: THORACIC SURGERY (CARDIOTHORACIC VASCULAR SURGERY)

## 2021-03-05 DEVICE — HEMOST ABS SURGICEL SNOW 1X2IN: Type: IMPLANTABLE DEVICE | Site: CAROTID | Status: FUNCTIONAL

## 2021-03-05 DEVICE — 10 MM X 30 MM
Type: IMPLANTABLE DEVICE | Site: CAROTID | Status: FUNCTIONAL
Brand: ENROUTE TRANSCAROTID STENT

## 2021-03-05 DEVICE — HEMOST ABS SURGIFOAM 8X6.25CM 10MM: Type: IMPLANTABLE DEVICE | Site: CAROTID | Status: FUNCTIONAL

## 2021-03-05 RX ORDER — SODIUM CHLORIDE 9 MG/ML
75 INJECTION, SOLUTION INTRAVENOUS CONTINUOUS
Status: DISCONTINUED | OUTPATIENT
Start: 2021-03-05 | End: 2021-03-06 | Stop reason: HOSPADM

## 2021-03-05 RX ORDER — ATORVASTATIN CALCIUM 40 MG/1
80 TABLET, FILM COATED ORAL DAILY
Status: DISCONTINUED | OUTPATIENT
Start: 2021-03-06 | End: 2021-03-06 | Stop reason: HOSPADM

## 2021-03-05 RX ORDER — ONDANSETRON 4 MG/1
4 TABLET, FILM COATED ORAL EVERY 6 HOURS PRN
Status: DISCONTINUED | OUTPATIENT
Start: 2021-03-05 | End: 2021-03-06 | Stop reason: HOSPADM

## 2021-03-05 RX ORDER — FENTANYL CITRATE 50 UG/ML
INJECTION, SOLUTION INTRAMUSCULAR; INTRAVENOUS AS NEEDED
Status: DISCONTINUED | OUTPATIENT
Start: 2021-03-05 | End: 2021-03-05 | Stop reason: SURG

## 2021-03-05 RX ORDER — SODIUM CHLORIDE 9 MG/ML
100 INJECTION, SOLUTION INTRAVENOUS CONTINUOUS
Status: DISCONTINUED | OUTPATIENT
Start: 2021-03-05 | End: 2021-03-05 | Stop reason: HOSPADM

## 2021-03-05 RX ORDER — BUPIVACAINE HCL/0.9 % NACL/PF 0.1 %
2 PLASTIC BAG, INJECTION (ML) EPIDURAL EVERY 8 HOURS
Status: COMPLETED | OUTPATIENT
Start: 2021-03-05 | End: 2021-03-05

## 2021-03-05 RX ORDER — PROPOFOL 10 MG/ML
VIAL (ML) INTRAVENOUS AS NEEDED
Status: DISCONTINUED | OUTPATIENT
Start: 2021-03-05 | End: 2021-03-05 | Stop reason: SURG

## 2021-03-05 RX ORDER — BISACODYL 10 MG
10 SUPPOSITORY, RECTAL RECTAL DAILY PRN
Status: DISCONTINUED | OUTPATIENT
Start: 2021-03-05 | End: 2021-03-06 | Stop reason: HOSPADM

## 2021-03-05 RX ORDER — SUCCINYLCHOLINE CHLORIDE 20 MG/ML
INJECTION INTRAMUSCULAR; INTRAVENOUS AS NEEDED
Status: DISCONTINUED | OUTPATIENT
Start: 2021-03-05 | End: 2021-03-05 | Stop reason: SURG

## 2021-03-05 RX ORDER — BUPIVACAINE HCL/0.9 % NACL/PF 0.1 %
2 PLASTIC BAG, INJECTION (ML) EPIDURAL ONCE
Status: COMPLETED | OUTPATIENT
Start: 2021-03-05 | End: 2021-03-05

## 2021-03-05 RX ORDER — PROTAMINE SULFATE 10 MG/ML
INJECTION, SOLUTION INTRAVENOUS AS NEEDED
Status: DISCONTINUED | OUTPATIENT
Start: 2021-03-05 | End: 2021-03-05 | Stop reason: SURG

## 2021-03-05 RX ORDER — LIDOCAINE HYDROCHLORIDE 20 MG/ML
INJECTION, SOLUTION INFILTRATION; PERINEURAL AS NEEDED
Status: DISCONTINUED | OUTPATIENT
Start: 2021-03-05 | End: 2021-03-05 | Stop reason: SURG

## 2021-03-05 RX ORDER — SODIUM CHLORIDE 9 MG/ML
INJECTION, SOLUTION INTRAVENOUS CONTINUOUS PRN
Status: DISCONTINUED | OUTPATIENT
Start: 2021-03-05 | End: 2021-03-05 | Stop reason: SURG

## 2021-03-05 RX ORDER — HEPARIN SODIUM 1000 [USP'U]/ML
INJECTION, SOLUTION INTRAVENOUS; SUBCUTANEOUS AS NEEDED
Status: DISCONTINUED | OUTPATIENT
Start: 2021-03-05 | End: 2021-03-05 | Stop reason: SURG

## 2021-03-05 RX ORDER — NEOSTIGMINE METHYLSULFATE 4 MG/4 ML
SYRINGE (ML) INTRAVENOUS AS NEEDED
Status: DISCONTINUED | OUTPATIENT
Start: 2021-03-05 | End: 2021-03-05 | Stop reason: SURG

## 2021-03-05 RX ORDER — MIDAZOLAM HYDROCHLORIDE 1 MG/ML
INJECTION INTRAMUSCULAR; INTRAVENOUS AS NEEDED
Status: DISCONTINUED | OUTPATIENT
Start: 2021-03-05 | End: 2021-03-05 | Stop reason: SURG

## 2021-03-05 RX ORDER — ASPIRIN 81 MG/1
81 TABLET ORAL DAILY
Status: DISCONTINUED | OUTPATIENT
Start: 2021-03-05 | End: 2021-03-06 | Stop reason: HOSPADM

## 2021-03-05 RX ORDER — AMOXICILLIN 250 MG
2 CAPSULE ORAL 2 TIMES DAILY PRN
Status: DISCONTINUED | OUTPATIENT
Start: 2021-03-05 | End: 2021-03-06 | Stop reason: HOSPADM

## 2021-03-05 RX ORDER — HEPARIN SODIUM 5000 [USP'U]/ML
INJECTION, SOLUTION INTRAVENOUS; SUBCUTANEOUS AS NEEDED
Status: DISCONTINUED | OUTPATIENT
Start: 2021-03-05 | End: 2021-03-05 | Stop reason: HOSPADM

## 2021-03-05 RX ORDER — HYDRALAZINE HYDROCHLORIDE 50 MG/1
50 TABLET, FILM COATED ORAL 2 TIMES DAILY
Status: DISCONTINUED | OUTPATIENT
Start: 2021-03-05 | End: 2021-03-06 | Stop reason: HOSPADM

## 2021-03-05 RX ORDER — SODIUM CHLORIDE 0.9 % (FLUSH) 0.9 %
3 SYRINGE (ML) INJECTION EVERY 12 HOURS SCHEDULED
Status: DISCONTINUED | OUTPATIENT
Start: 2021-03-05 | End: 2021-03-05 | Stop reason: HOSPADM

## 2021-03-05 RX ORDER — CLOPIDOGREL BISULFATE 75 MG/1
75 TABLET ORAL DAILY
Status: DISCONTINUED | OUTPATIENT
Start: 2021-03-06 | End: 2021-03-06 | Stop reason: HOSPADM

## 2021-03-05 RX ORDER — ACETAMINOPHEN 325 MG/1
650 TABLET ORAL EVERY 4 HOURS PRN
Status: DISCONTINUED | OUTPATIENT
Start: 2021-03-05 | End: 2021-03-06 | Stop reason: HOSPADM

## 2021-03-05 RX ORDER — ROCURONIUM BROMIDE 10 MG/ML
INJECTION, SOLUTION INTRAVENOUS AS NEEDED
Status: DISCONTINUED | OUTPATIENT
Start: 2021-03-05 | End: 2021-03-05 | Stop reason: SURG

## 2021-03-05 RX ORDER — ONDANSETRON 2 MG/ML
4 INJECTION INTRAMUSCULAR; INTRAVENOUS EVERY 6 HOURS PRN
Status: DISCONTINUED | OUTPATIENT
Start: 2021-03-05 | End: 2021-03-06 | Stop reason: HOSPADM

## 2021-03-05 RX ORDER — NITROGLYCERIN 20 MG/100ML
5-200 INJECTION INTRAVENOUS
Status: DISCONTINUED | OUTPATIENT
Start: 2021-03-05 | End: 2021-03-06 | Stop reason: HOSPADM

## 2021-03-05 RX ORDER — EPHEDRINE SULFATE 50 MG/ML
INJECTION, SOLUTION INTRAVENOUS AS NEEDED
Status: DISCONTINUED | OUTPATIENT
Start: 2021-03-05 | End: 2021-03-05 | Stop reason: SURG

## 2021-03-05 RX ORDER — SODIUM CHLORIDE 0.9 % (FLUSH) 0.9 %
10 SYRINGE (ML) INJECTION AS NEEDED
Status: DISCONTINUED | OUTPATIENT
Start: 2021-03-05 | End: 2021-03-05 | Stop reason: HOSPADM

## 2021-03-05 RX ORDER — ALBUTEROL SULFATE 2.5 MG/3ML
2.5 SOLUTION RESPIRATORY (INHALATION)
Status: DISCONTINUED | OUTPATIENT
Start: 2021-03-05 | End: 2021-03-06 | Stop reason: HOSPADM

## 2021-03-05 RX ADMIN — PROTAMINE SULFATE 20 MG: 10 INJECTION, SOLUTION INTRAVENOUS at 08:54

## 2021-03-05 RX ADMIN — CEFAZOLIN SODIUM 2 G: 10 INJECTION, POWDER, FOR SOLUTION INTRAVENOUS at 18:51

## 2021-03-05 RX ADMIN — ROCURONIUM BROMIDE 35 MG: 50 INJECTION INTRAVENOUS at 07:49

## 2021-03-05 RX ADMIN — GLYCOPYRROLATE 0.2 MG: 0.2 INJECTION, SOLUTION INTRAMUSCULAR; INTRAVITREAL at 08:05

## 2021-03-05 RX ADMIN — CEFAZOLIN SODIUM 2 G: 10 INJECTION, POWDER, FOR SOLUTION INTRAVENOUS at 11:08

## 2021-03-05 RX ADMIN — Medication 2 MG: at 08:55

## 2021-03-05 RX ADMIN — FENTANYL CITRATE 50 MCG: 50 INJECTION INTRAMUSCULAR; INTRAVENOUS at 08:58

## 2021-03-05 RX ADMIN — ALBUTEROL SULFATE 2.5 MG: 2.5 SOLUTION RESPIRATORY (INHALATION) at 15:32

## 2021-03-05 RX ADMIN — ALBUTEROL SULFATE 2.5 MG: 2.5 SOLUTION RESPIRATORY (INHALATION) at 22:17

## 2021-03-05 RX ADMIN — PROPOFOL 100 MG: 10 INJECTION, EMULSION INTRAVENOUS at 07:36

## 2021-03-05 RX ADMIN — SODIUM CHLORIDE 75 ML/HR: 9 INJECTION, SOLUTION INTRAVENOUS at 14:30

## 2021-03-05 RX ADMIN — EPHEDRINE SULFATE 10 MG: 50 INJECTION INTRAVENOUS at 08:03

## 2021-03-05 RX ADMIN — GLYCOPYRROLATE 0.4 MG: 0.2 INJECTION, SOLUTION INTRAMUSCULAR; INTRAVITREAL at 08:55

## 2021-03-05 RX ADMIN — MIDAZOLAM HYDROCHLORIDE 1 MG: 2 INJECTION, SOLUTION INTRAMUSCULAR; INTRAVENOUS at 07:29

## 2021-03-05 RX ADMIN — SUCCINYLCHOLINE CHLORIDE 120 MG: 20 INJECTION, SOLUTION INTRAMUSCULAR; INTRAVENOUS at 07:36

## 2021-03-05 RX ADMIN — ROCURONIUM BROMIDE 5 MG: 50 INJECTION INTRAVENOUS at 07:36

## 2021-03-05 RX ADMIN — SODIUM CHLORIDE: 900 INJECTION, SOLUTION INTRAVENOUS at 07:45

## 2021-03-05 RX ADMIN — LIDOCAINE HYDROCHLORIDE 50 MG: 20 INJECTION, SOLUTION INFILTRATION; PERINEURAL at 07:36

## 2021-03-05 RX ADMIN — SODIUM CHLORIDE 100 ML/HR: 9 INJECTION, SOLUTION INTRAVENOUS at 06:02

## 2021-03-05 RX ADMIN — PHENYLEPHRINE HYDROCHLORIDE 0.1 MCG/KG/MIN: 10 INJECTION INTRAVENOUS at 08:00

## 2021-03-05 RX ADMIN — HEPARIN SODIUM 8000 UNITS: 1000 INJECTION INTRAVENOUS; SUBCUTANEOUS at 08:13

## 2021-03-05 RX ADMIN — GLYCOPYRROLATE 0.2 MG: 0.2 INJECTION, SOLUTION INTRAMUSCULAR; INTRAVITREAL at 07:58

## 2021-03-05 RX ADMIN — FENTANYL CITRATE 50 MCG: 50 INJECTION INTRAMUSCULAR; INTRAVENOUS at 07:36

## 2021-03-05 RX ADMIN — ACETAMINOPHEN 650 MG: 325 TABLET, FILM COATED ORAL at 11:19

## 2021-03-05 RX ADMIN — Medication 2 G: at 07:55

## 2021-03-05 RX ADMIN — HYDRALAZINE HYDROCHLORIDE 50 MG: 50 TABLET ORAL at 20:54

## 2021-03-05 NOTE — ANESTHESIA PROCEDURE NOTES
Airway  Urgency: elective    Date/Time: 3/5/2021 7:38 AM  Airway not difficult    General Information and Staff    Patient location during procedure: OR  CRNA: Owen Lopez CRNA    Indications and Patient Condition  Indications for airway management: airway protection    Preoxygenated: yes  MILS maintained throughout  Mask difficulty assessment: 0 - not attempted    Final Airway Details  Final airway type: endotracheal airway      Successful airway: ETT  Cuffed: yes   Successful intubation technique: direct laryngoscopy  Endotracheal tube insertion site: oral  Blade: Cifuentes  Blade size: 4  ETT size (mm): 7.5  Cormack-Lehane Classification: grade I - full view of glottis  Placement verified by: chest auscultation and capnometry   Cuff volume (mL): 7  Measured from: lips  ETT/EBT  to lips (cm): 22  Number of attempts at approach: 1  Assessment: lips, teeth, and gum same as pre-op and atraumatic intubation

## 2021-03-05 NOTE — PAYOR COMM NOTE
"Lyndsey Mendoza  [facility]  P: [phone]  F: [phone]        Mason Ryder (70 y.o. Male)     Date of Birth Social Security Number Address Home Phone MRN    1950  [address]  [address]  [facility] [address] [phone] [id number]    Rastafarian Marital Status          [facility] Single       Admission Date Admission Type Admitting Provider Attending Provider Department, Room/Bed    3/5/21 Elective Kavon Robison MD [place], Thomas Mark, MD [facility] CRITICAL CARE STEPDOWN, 13/A    Discharge Date Discharge Disposition Discharge Destination                       Attending Provider: Kavon Robison MD    Allergies: Lisinopril    Isolation: None   Infection: None   Code Status: CPR    Ht: 182.9 cm (72\")   Wt: 64.9 kg (143 lb 1.3 oz)    Admission Cmt: None   Principal Problem: Carotid stenosis, asymptomatic, bilateral [I65.23]                 Active Insurance as of 3/5/2021     Primary Coverage     Payor Plan Insurance Group Employer/Plan Group    MEDICARE MEDICARE A & B      Payor Plan Address Payor Plan Phone Number Payor Plan Fax Number Effective Dates    [address] [phone]  10/1/2017 - None Entered    [facility] [address]       Subscriber Name Subscriber Birth Date Member ID       MASON RYDER 1950 [id number]           Secondary Coverage     Payor Plan Insurance Group Employer/Plan Group    [facility] [id number]     Payor Plan Address Payor Plan Phone Number Payor Plan Fax Number Effective Dates    [address] [address]   1/1/2017 - None Entered    [facility] [phone]       Subscriber Name Subscriber Birth Date Member ID       MASON RYDER 1950 [id number]                 Emergency Contacts      (Rel.) Home Phone Work Phone Mobile Phone    Julian Piper (Grandchild) [phone] -- [phone]               History & Physical      Kavon Robison MD at 03/05/21 0652          Mason Rosa " Aleksey  1950     Chief Complaint:         Chief Complaint   Patient presents with   • Carotid Artery Disease       ct results         HPI:       PCP:  Stephanie Mai MD     70 y.o. male with HTN(stable, increased risk stroke, rupture), Hyperlipidemia(stable, increased risk cardiovascular events) and Smoker(uncontrolled, increased risk cardiovascular events) BPH, PVD (stable, increased risk cardiovascular events).  smokes 1 PPD.  Established with CTVS in 2013 with lifestyle limiting claudication, prior R SFA stent occluded.  Underwent bypass which occluded the following year requiring lysis.  Unfortunately he continues to smoke.  No complaints of claudication at today's visit.  No other associated signs, symptoms or modifying factors. Returns for CTA results.     2012 RIGHT SFA stent  2013 RIGHT femoral to popliteal artery bypass (PTFE)  12/15/2014 RIGHT lower extremity angiogram:  RIGHT fem-pop thrombolysis, EKOS.  12/2020 Restudy: Graft patent  11/22/2016 RANGEL:  RIGHT 1.1 triphasic.  LEFT 1.1 triphasic.  Graft 169cm/s, triphasic.  5/22/2017 RANGEL:  RIGHT 1.1 triphasic.  LEFT 1.0 triphasic.  Graft 186cm/s, triphasic.  12/06/17   RANGEL:  RIGHT 1.12  Triphasic.  LEFT 0.99  Triphasic.  Graft Patent 237 cm/s, triphasic.  06/07/18  RANGEL:  RIGHT 0.99  Triphasic.  LEFT 0.98  Triphasic.   12/18/18  RANGEL: Right 1.04 Fem triphasic Poptriphasic DP triphasic PT triphasic, Left 0.95 Fem triphasic Poptriphasic DP triphasic PT triphasic  12/18/18  U/S Graft Survey: RT PTFE bypass patent mPSV 236 at proximal anastomosis   7/16/19 RANGEL: Right 1.07 triphasic.  Left 0.99 triphasic.  7/2020 RANGEL: Right 1.0 triphasic.  Left 1.05 triphasic.   7/2020 Right Graft US: Patent, [id number] proximal anastamosis  1/2021 RANGEL: Right 0.9 triphasic.  Left 0.94 triphasic.      7/2020 Carotid Duplex: GELACIO 50-69% mPSV 133c/s Ratio 1.3, LICA 50-69% mPSV 139c/s Ratio 0.8, Antegrade vertebrals  1/2021 Carotid Duplex: GELACIO >70% mPSV 235c/s Ratio 1.9, LICA  50-69% mPSV 154c/s Ratio 1.2, Antegrade vertebrals  1/27/2021:CTA Carotids: GELACIO 80% RSCA 60% LICA 40%     The following portions of the patient's history were reviewed and updated as appropriate: allergies, current medications, past family history, past medical history, past social history, past surgical history and problem list.  Recent images independently reviewed.  Available laboratory values reviewed.     PMH:  Medical History        Past Medical History:   Diagnosis Date   • Artificial lens present       right   • Astigmatism       myopic   • Atherosclerosis of native arteries of extremity with intermittent claudication (CMS/HCC)     • Benign essential hypertension     • Benign prostatic hyperplasia     • Cataract       R>L   • Corneal foreign body       Left eye, 2 years ago      • Essential hypertension     • Examination       individual health   • Exanthematous disorder     • Fracture of foot     • Hypercholesterolemia     • Hyperkalemia     • Hyperlipidemia     • Hypertensive disorder     • Male erectile disorder     • Need for vaccination     • Peripheral vascular disease (CMS/HCC)       Post RIGHT fem-pop bypass graft 1/7/13 Post thrombolysis to graft 12/15/14      • Peripheral vascular disease (CMS/HCC)       unspecified   • Posterior subcapsular polar senile cataract     • Surgical follow-up care       R fem->pop bypass 1/7/13      • Tobacco dependence syndrome     • Tobacco user     • Urticaria       will give kenalog and benadyl , will order allery test      • Visual discomfort          Surgical History         Past Surgical History:   Procedure Laterality Date   • ANGIOPLASTY         femoral-popliteal artery (dilation) (Abdominal aortogram, bilateral lower extremity runoff. Repair of left common femoral artery.)   11/26/2012    • FEMORAL POPLITEAL BYPASS         Right with 6 mm [place]-baron graft)   01/07/2013    • INJECTION OF MEDICATION   03/04/2013     kenalog(3)    • OTHER SURGICAL HISTORY          Remove cataract, insert lens (Cataract extraction with intraocular lens implantation, right eye.)   11/26/2013               Family History   Problem Relation Age of Onset   • Cancer Other     • Heart disease Other     • Hypertension Other     • Stroke Other        Social History            Tobacco Use   • Smoking status: Current Every Day Smoker       Packs/day: 2.00       Years: 30.00       Pack years: 60.00       Types: Cigarettes   • Smokeless tobacco: Never Used   Substance Use Topics   • Alcohol use: Yes       Alcohol/week: 6.0 standard drinks       Types: 6 Cans of beer per week       Comment: daily   • Drug use: No         ALLERGIES:       Allergies   Allergen Reactions   • Lisinopril Angioedema            MEDICATIONS:     Current Outpatient Medications:   •  aspirin 81 MG EC tablet, Take 1 tablet by mouth Daily., Disp: 30 tablet, Rfl: 1  •  atorvastatin (LIPITOR) 80 MG tablet, TAKE 1 TABLET BY MOUTH DAILY, Disp: 30 tablet, Rfl: 5  •  clopidogrel (PLAVIX) 75 MG tablet, TAKE 1 TABLET BY MOUTH DAILY, Disp: 30 tablet, Rfl: 5  •  dilTIAZem CD (CARDIZEM CD) 300 MG 24 hr capsule, TAKE 1 CAPSULE BY MOUTH DAILY, Disp: 30 capsule, Rfl: 5  •  hydrALAZINE (APRESOLINE) 50 MG tablet, TAKE 1 TABLET BY MOUTH TWICE DAILY (Patient taking differently: Take 50 mg by mouth 2 (two) times a day.), Disp: 180 tablet, Rfl: 2        Review of Systems   Constitution: Negative for chills, decreased appetite, fever and weight loss.   HENT: Negative for congestion, nosebleeds and sore throat.    Eyes: Negative for blurred vision, visual disturbance and visual halos.   Cardiovascular: Negative for chest pain, dyspnea on exertion and leg swelling.   Respiratory: Negative for cough, shortness of breath, sputum production and wheezing.    Endocrine: Negative for cold intolerance and polyuria.   Hematologic/Lymphatic: Negative for bleeding problem. Bruises/bleeds easily.   Skin: Positive for unusual hair distribution. Negative for flushing  and nail changes.   Musculoskeletal: Positive for arthritis, back pain and joint pain.   Gastrointestinal: Negative for bloating, abdominal pain, hematemesis, melena, nausea and vomiting.   Genitourinary: Negative for flank pain and hematuria.   Neurological: Negative for brief paralysis, difficulty with concentration, focal weakness, light-headedness, loss of balance, numbness, paresthesias and weakness.   Psychiatric/Behavioral: Negative for altered mental status, depression, substance abuse and suicidal ideas.   Allergic/Immunologic: Negative for hives and persistent infections.            Vitals       Vitals:     01/29/21 0900   BP: 140/59   Pulse: 73   Resp: 18   SpO2: 100%        Physical Exam   Constitutional: He is oriented to person, place, and time. He appears well-developed.   HENT:   Head: Normocephalic and atraumatic.   Eyes: Pupils are equal, round, and reactive to light. Conjunctivae are normal.   Neck: Normal range of motion. Neck supple.   Cardiovascular: Normal rate.   No murmur heard.  Pulmonary/Chest: Effort normal and breath sounds normal.   Abdominal: Soft. Bowel sounds are normal.   Musculoskeletal: Normal range of motion. No tenderness.   Neurological: He is alert and oriented to person, place, and time. No cranial nerve deficit.   Skin: Skin is warm and dry. Capillary refill takes less than 2 seconds.   There is no evidence of skin breakdown of the bilateral lower extremities.  BLE pink, no evidence of ischemia.  Feet are absent of dependent rubor.   Psychiatric: Judgment normal.   Nursing note and vitals reviewed.        Assessment & Plan      Independent Review of Studies  Detailed discussion regarding risks, benefits, and treatment plan. Images independently reviewed. Patient understands, agrees, and wishes to proceed with plan.       1. Carotid stenosis, asymptomatic, bilateral  severe Carotid Artery Stenosis right 75% on CTA Asymptomatic   Medical Management: ASA,PLAVIX,STATIN   Proceed  with TCAR (Carotid Stent) Evaluation    If you should experience any neurological symptoms including but not limited to visual or speech disturbances confusion, seizures, or weakness of limbs of one side of your body notify Heart and Vascular center immediately for evaluation or if after hours present to the nearest Emergency Department.    Return as scheduled     Detailed discussion with Dwight Ryder regarding situation, options and plans.  severe,  asymptomatic GELACIO 80% carotid stenosis.  Carotid intervention is advisable.  Increased risk for Stroke and cardiovascular complications.  Carotid stenting is advisable. Surgical inaccessible lesion, films sent for TCAR evaluation.     Detailed discussion with Dwight Ryder regarding situation, options and plans.  severe,  asymptomatic  carotid stenosis.  Carotid intervention is advisable.  Increased risk for Stroke and cardiovascular complications.  Carotid stenting is advisable.    Risks, including but not limited to:  Mortality, major morbidity 1%.  Stroke risk 2-3%.  bleeding, transfusion, infection, pulmonary, renal dysfunction, blood vessel and nerve injury.  Benefits:  reduction of stroke risk  Options: carotid endarterectomy, stenting and medical therapy discussed.   usually overnight stay in hospital if all well. Understands and wishes to proceed.    RIGHT Transcarotid artery revascularization, carotid cerebral arteriogram, carotid stent, possible carotid endarterectomy, radial arterial line, GEN  SDS  3/5/2021    2. Stage 3a chronic kidney disease (CMS/[facility])        Lab Results   Component Value Date     CREATININE 1.58 (H) 01/21/2021         3. Essential hypertension  Controlled.      4. Nicotine dependence, cigarettes, with other nicotine-induced disorders  Smoking cessation assistance options offered including behavioral counseling (Smoking Cessation Classes), Nicotine replacement therapy (patches or gum), pharmacologic therapy (Chantix, Wellbutrin).  Understands tobacco increases risk of expanding AAA, MI, CVA, PAD, carcinoma. Discussion and question answer period 5-7 minutes. Dwight Ryder  reports that he has been smoking cigarettes. He has a 60.00 pack-year smoking history. He has never used smokeless tobacco.. I have educated him on the risk of diseases from using tobacco products such as cancer, COPD and heart disease.      I advised him to quit and he is not willing to quit.                 This document has been electronically signed by Kavon Robison MD on March 5, 2021 06:54 CST        Electronically signed by Kavon Robison MD at 03/05/21 0688

## 2021-03-05 NOTE — ANESTHESIA PROCEDURE NOTES
Peripheral IV    Patient location during procedure: OR  Line placed for Fluids/Medication Admin.  Performed By   CRNA: Owen Lopez CRNA  Preanesthetic Checklist  Completed: patient identified, site marked, surgical consent, pre-op evaluation, timeout performed, IV checked, risks and benefits discussed and monitors and equipment checked  Peripheral IV Prep   Patient position: supine   Prep: ChloraPrep and alcohol swabs  Patient monitoring: heart rate, cardiac monitor and continuous pulse ox  Peripheral IV Procedure   Laterality:left  Location:  Antecubital  Catheter size: 18 G         Post Assessment   Dressing Type: transparent and tape.    IV Dressing/Site: clean, dry and intact

## 2021-03-05 NOTE — H&P
Dwight Ryder  1950     Chief Complaint:         Chief Complaint   Patient presents with   • Carotid Artery Disease       ct results         HPI:       PCP:  Stephanie Mai MD     70 y.o. male with HTN(stable, increased risk stroke, rupture), Hyperlipidemia(stable, increased risk cardiovascular events) and Smoker(uncontrolled, increased risk cardiovascular events) BPH, PVD (stable, increased risk cardiovascular events).  smokes 1 PPD.  Established with CTVS in 2013 with lifestyle limiting claudication, prior R SFA stent occluded.  Underwent bypass which occluded the following year requiring lysis.  Unfortunately he continues to smoke.  No complaints of claudication at today's visit.  No other associated signs, symptoms or modifying factors. Returns for CTA results.     2012 RIGHT SFA stent  2013 RIGHT femoral to popliteal artery bypass (PTFE)  12/15/2014 RIGHT lower extremity angiogram:  RIGHT fem-pop thrombolysis, EKOS.  12/2020 Restudy: Graft patent  11/22/2016 RANGEL:  RIGHT 1.1 triphasic.  LEFT 1.1 triphasic.  Graft 169cm/s, triphasic.  5/22/2017 RANGEL:  RIGHT 1.1 triphasic.  LEFT 1.0 triphasic.  Graft 186cm/s, triphasic.  12/06/17   RANGEL:  RIGHT 1.12  Triphasic.  LEFT 0.99  Triphasic.  Graft Patent 237 cm/s, triphasic.  06/07/18  RANGEL:  RIGHT 0.99  Triphasic.  LEFT 0.98  Triphasic.   12/18/18  RANGEL: Right 1.04 Fem triphasic Poptriphasic DP triphasic PT triphasic, Left 0.95 Fem triphasic Poptriphasic DP triphasic PT triphasic  12/18/18  U/S Graft Survey: RT PTFE bypass patent mPSV 236 at proximal anastomosis   7/16/19 RANGEL: Right 1.07 triphasic.  Left 0.99 triphasic.  7/2020 RANGEL: Right 1.0 triphasic.  Left 1.05 triphasic.   7/2020 Right Graft US: Patent, [id number] proximal anastamosis  1/2021 RANGEL: Right 0.9 triphasic.  Left 0.94 triphasic.      7/2020 Carotid Duplex: GELACIO 50-69% mPSV 133c/s Ratio 1.3, LICA 50-69% mPSV 139c/s Ratio 0.8, Antegrade vertebrals  1/2021 Carotid Duplex: GELACIO >70% mPSV 235c/s Ratio  1.9, LICA 50-69% mPSV 154c/s Ratio 1.2, Antegrade vertebrals  1/27/2021:CTA Carotids: GELACIO 80% RSCA 60% LICA 40%     The following portions of the patient's history were reviewed and updated as appropriate: allergies, current medications, past family history, past medical history, past social history, past surgical history and problem list.  Recent images independently reviewed.  Available laboratory values reviewed.     PMH:  Medical History        Past Medical History:   Diagnosis Date   • Artificial lens present       right   • Astigmatism       myopic   • Atherosclerosis of native arteries of extremity with intermittent claudication (CMS/HCC)     • Benign essential hypertension     • Benign prostatic hyperplasia     • Cataract       R>L   • Corneal foreign body       Left eye, 2 years ago      • Essential hypertension     • Examination       individual health   • Exanthematous disorder     • Fracture of foot     • Hypercholesterolemia     • Hyperkalemia     • Hyperlipidemia     • Hypertensive disorder     • Male erectile disorder     • Need for vaccination     • Peripheral vascular disease (CMS/HCC)       Post RIGHT fem-pop bypass graft 1/7/13 Post thrombolysis to graft 12/15/14      • Peripheral vascular disease (CMS/HCC)       unspecified   • Posterior subcapsular polar senile cataract     • Surgical follow-up care       R fem->pop bypass 1/7/13      • Tobacco dependence syndrome     • Tobacco user     • Urticaria       will give kenalog and benadyl , will order allery test      • Visual discomfort          Surgical History         Past Surgical History:   Procedure Laterality Date   • ANGIOPLASTY         femoral-popliteal artery (dilation) (Abdominal aortogram, bilateral lower extremity runoff. Repair of left common femoral artery.)   11/26/2012    • FEMORAL POPLITEAL BYPASS         Right with 6 mm [place]-baron graft)   01/07/2013    • INJECTION OF MEDICATION   03/04/2013     kenalog(3)    • OTHER SURGICAL  HISTORY         Remove cataract, insert lens (Cataract extraction with intraocular lens implantation, right eye.)   11/26/2013               Family History   Problem Relation Age of Onset   • Cancer Other     • Heart disease Other     • Hypertension Other     • Stroke Other        Social History            Tobacco Use   • Smoking status: Current Every Day Smoker       Packs/day: 2.00       Years: 30.00       Pack years: 60.00       Types: Cigarettes   • Smokeless tobacco: Never Used   Substance Use Topics   • Alcohol use: Yes       Alcohol/week: 6.0 standard drinks       Types: 6 Cans of beer per week       Comment: daily   • Drug use: No         ALLERGIES:       Allergies   Allergen Reactions   • Lisinopril Angioedema            MEDICATIONS:     Current Outpatient Medications:   •  aspirin 81 MG EC tablet, Take 1 tablet by mouth Daily., Disp: 30 tablet, Rfl: 1  •  atorvastatin (LIPITOR) 80 MG tablet, TAKE 1 TABLET BY MOUTH DAILY, Disp: 30 tablet, Rfl: 5  •  clopidogrel (PLAVIX) 75 MG tablet, TAKE 1 TABLET BY MOUTH DAILY, Disp: 30 tablet, Rfl: 5  •  dilTIAZem CD (CARDIZEM CD) 300 MG 24 hr capsule, TAKE 1 CAPSULE BY MOUTH DAILY, Disp: 30 capsule, Rfl: 5  •  hydrALAZINE (APRESOLINE) 50 MG tablet, TAKE 1 TABLET BY MOUTH TWICE DAILY (Patient taking differently: Take 50 mg by mouth 2 (two) times a day.), Disp: 180 tablet, Rfl: 2        Review of Systems   Constitution: Negative for chills, decreased appetite, fever and weight loss.   HENT: Negative for congestion, nosebleeds and sore throat.    Eyes: Negative for blurred vision, visual disturbance and visual halos.   Cardiovascular: Negative for chest pain, dyspnea on exertion and leg swelling.   Respiratory: Negative for cough, shortness of breath, sputum production and wheezing.    Endocrine: Negative for cold intolerance and polyuria.   Hematologic/Lymphatic: Negative for bleeding problem. Bruises/bleeds easily.   Skin: Positive for unusual hair distribution. Negative  for flushing and nail changes.   Musculoskeletal: Positive for arthritis, back pain and joint pain.   Gastrointestinal: Negative for bloating, abdominal pain, hematemesis, melena, nausea and vomiting.   Genitourinary: Negative for flank pain and hematuria.   Neurological: Negative for brief paralysis, difficulty with concentration, focal weakness, light-headedness, loss of balance, numbness, paresthesias and weakness.   Psychiatric/Behavioral: Negative for altered mental status, depression, substance abuse and suicidal ideas.   Allergic/Immunologic: Negative for hives and persistent infections.            Vitals       Vitals:     01/29/21 0900   BP: 140/59   Pulse: 73   Resp: 18   SpO2: 100%        Physical Exam   Constitutional: He is oriented to person, place, and time. He appears well-developed.   HENT:   Head: Normocephalic and atraumatic.   Eyes: Pupils are equal, round, and reactive to light. Conjunctivae are normal.   Neck: Normal range of motion. Neck supple.   Cardiovascular: Normal rate.   No murmur heard.  Pulmonary/Chest: Effort normal and breath sounds normal.   Abdominal: Soft. Bowel sounds are normal.   Musculoskeletal: Normal range of motion. No tenderness.   Neurological: He is alert and oriented to person, place, and time. No cranial nerve deficit.   Skin: Skin is warm and dry. Capillary refill takes less than 2 seconds.   There is no evidence of skin breakdown of the bilateral lower extremities.  BLE pink, no evidence of ischemia.  Feet are absent of dependent rubor.   Psychiatric: Judgment normal.   Nursing note and vitals reviewed.        Assessment & Plan      Independent Review of Studies  Detailed discussion regarding risks, benefits, and treatment plan. Images independently reviewed. Patient understands, agrees, and wishes to proceed with plan.       1. Carotid stenosis, asymptomatic, bilateral  severe Carotid Artery Stenosis right 75% on CTA Asymptomatic   Medical Management:  ASA,PLAVIX,STATIN   Proceed with TCAR (Carotid Stent) Evaluation    If you should experience any neurological symptoms including but not limited to visual or speech disturbances confusion, seizures, or weakness of limbs of one side of your body notify Heart and Vascular center immediately for evaluation or if after hours present to the nearest Emergency Department.    Return as scheduled     Detailed discussion with Dwight Ryder regarding situation, options and plans.  severe,  asymptomatic GELACIO 80% carotid stenosis.  Carotid intervention is advisable.  Increased risk for Stroke and cardiovascular complications.  Carotid stenting is advisable. Surgical inaccessible lesion, films sent for TCAR evaluation.     Detailed discussion with Dwight Ryder regarding situation, options and plans.  severe,  asymptomatic  carotid stenosis.  Carotid intervention is advisable.  Increased risk for Stroke and cardiovascular complications.  Carotid stenting is advisable.    Risks, including but not limited to:  Mortality, major morbidity 1%.  Stroke risk 2-3%.  bleeding, transfusion, infection, pulmonary, renal dysfunction, blood vessel and nerve injury.  Benefits:  reduction of stroke risk  Options: carotid endarterectomy, stenting and medical therapy discussed.   usually overnight stay in hospital if all well. Understands and wishes to proceed.    RIGHT Transcarotid artery revascularization, carotid cerebral arteriogram, carotid stent, possible carotid endarterectomy, radial arterial line, GEN  SDS  3/5/2021    2. Stage 3a chronic kidney disease (CMS/[facility])        Lab Results   Component Value Date     CREATININE 1.58 (H) 01/21/2021         3. Essential hypertension  Controlled.      4. Nicotine dependence, cigarettes, with other nicotine-induced disorders  Smoking cessation assistance options offered including behavioral counseling (Smoking Cessation Classes), Nicotine replacement therapy (patches or gum), pharmacologic  therapy (Chantix, Wellbutrin). Understands tobacco increases risk of expanding AAA, MI, CVA, PAD, carcinoma. Discussion and question answer period 5-7 minutes. Dwight Ryder  reports that he has been smoking cigarettes. He has a 60.00 pack-year smoking history. He has never used smokeless tobacco.. I have educated him on the risk of diseases from using tobacco products such as cancer, COPD and heart disease.      I advised him to quit and he is not willing to quit.                 This document has been electronically signed by Kavon Robison MD on March 5, 2021 06:54 CST

## 2021-03-05 NOTE — ANESTHESIA PROCEDURE NOTES
Arterial Line      Patient location during procedure: OR   Line placed for hemodynamic monitoring and ABGs/Labs/ISTAT.  Performed By   CRNA: Owen Lopez CRNA  Preanesthetic Checklist  Completed: patient identified, site marked, surgical consent, pre-op evaluation, timeout performed, IV checked, risks and benefits discussed and monitors and equipment checked  Arterial Line Prep   Sterile Tech: cap, gloves, gown and mask  Prep: ChloraPrep and alcohol swabs  Patient monitoring: blood pressure monitoring, continuous pulse oximetry and EKG  Arterial Line Procedure   Laterality:right  Location:  radial artery  Catheter size: 20 G   Guidance: palpation technique  Number of attempts: 1  Successful placement: yes  Post Assessment   Dressing Type: secured with tape and occlusive dressing applied.   Complications no  Circ/Move/Sens Assessment: normal.   Patient Tolerance: patient tolerated the procedure well with no apparent complications

## 2021-03-05 NOTE — ANESTHESIA PREPROCEDURE EVALUATION
Anesthesia Evaluation     Patient summary reviewed and Nursing notes reviewed   no history of anesthetic complications:  NPO Solid Status: > 8 hours  NPO Liquid Status: > 8 hours           Airway   Mallampati: II  TM distance: >3 FB  Neck ROM: full  possible difficult intubation and Narrow palate  Dental    (+) poor dentation    Pulmonary     breath sounds clear to auscultation  (+) a smoker Current Smoked day of surgery, COPD mild, decreased breath sounds,   Cardiovascular     ECG reviewed  PT is on anticoagulation therapy  Rhythm: regular  Rate: normal    (+) hypertension, murmur (Grade II/VI systolic), carotid bruits (Bruit versus radiation of mumur into right neck.), PVD (Previous right fem-pop bypass graft 2013.), hyperlipidemia,  carotid artery disease carotid bilateral    ROS comment: Normal sinus rhythm  Possible Left atrial enlargement  Borderline ECG  When compared with ECG of 28-DEC-2012 15:05,  No significant change was found     Referred By:             Confirmed By:     Specimen Collected: 03/02/21 09:52          Neuro/Psych- negative ROS  GI/Hepatic/Renal/Endo    (+)   renal disease (Creatinine 1.38),     ROS Comment: HGB 9.6 HCT 31.3    Musculoskeletal (-) negative ROS    Abdominal    Substance History - negative use     OB/GYN negative ob/gyn ROS         Other - negative ROS       ROS/Med Hx Other: Sodium 132.                  Anesthesia Plan    ASA 4     general   (Discussed additional peripheral IV,arterial line and patient understands possible complications,risks and agrees.)  intravenous induction     Anesthetic plan, all risks, benefits, and alternatives have been provided, discussed and informed consent has been obtained with: patient.

## 2021-03-05 NOTE — ANESTHESIA POSTPROCEDURE EVALUATION
Patient: Dwight Ryder    Procedure Summary     Date: 03/05/21 Room / Location: [facility] OR 05 / [facility] OR    Anesthesia Start: 0729 Anesthesia Stop: 0914    Procedure: RIGHT TRANSCAROTID ARTERY REVASCULARIZATION carotid cerebral arteriogram (Right Neck) Diagnosis:       Carotid stenosis, asymptomatic, bilateral      (Carotid stenosis, asymptomatic, bilateral [I65.23])    Surgeon: Kavon Robison MD Provider: José Antonio Tovar MD    Anesthesia Type: general ASA Status: 4          Anesthesia Type: general    Vitals  No vitals data found for the desired time range.          Post Anesthesia Care and Evaluation    Patient location during evaluation: PACU  Patient participation: complete - patient participated  Level of consciousness: sleepy but conscious  Pain management: adequate  Airway patency: patent  Anesthetic complications: No anesthetic complications    Cardiovascular status: acceptable  Respiratory status: acceptable  Hydration status: acceptable    Comments: Moved all extremities before extubation   ---------------------------               03/05/21                      0551         ---------------------------   BP:          136/59         Pulse:        71           Resp:          14           Temp:   97.8 °F (36.6 °C)   SpO2:          99%         ---------------------------

## 2021-03-05 NOTE — OP NOTE
OPERATIVE NOTE  Dwight Ryder  1950  3/5/2021    PREOP DIAGNOSES:    RIGHT carotid artery stenosis, asymptomatic  High risk criteria:  surgically inaccessible lesion  SVS VQI VQI-TSP:  [id number]    POSTOP DIAGNOSES:   RIGHT carotid artery stenosis    PROCEDURE:   RIGHT Transcatheter placement cervical carotid stent open with distal embolic protection  (TCAR, [id number] EnRoute stent,6.5x30mm Sharath balloon predilation, EnRoute NPS flow reversal system)  Select RIGHT carotid cerebral arteriogram  Vascular ultrasound guidance (femoral venous cannulation)    SURGEON: MARY Robison MD FACS RPVI    ASSISTANT: Fidel Joaquin CFA  provided critical assistance during the case including suctioning, exposure, retraction, and reduction of blood loss.    ANESTHESIA: GEN    CONTRAST:  12ml isovue    MEDICATIONS: 8000 units heparin, 0.4 mg glycopyrrolate    FLUORO TIME:  2.5min    FLOW REVERSAL TIME:  10min    ESTIMATED BLOOD LOSS: Minimal    SPECIMEN:  None    COMPLICATIONS: none    DESCRIPTION OF OPERATION:   Location: bifurcation and ICA  Lesion type:  atherosclerosis  Lesion circumference calcification:  70%  Lesion length:  26mm  Degree of stenosis:  95%  ICA tortuosity:  mild  Aortic Arch: type 2  Aortic Arch calcification:  moderate    Antiplatelet, statin confirmed.    Patient taken to the operating room, placed in supine position, neck extended.  Vascular ultrasound used to identify the LEFT common femoral vein  9mm in diameter no plaque, cannulated via modified Seldinger technique with mini stick under ultrasound guidance. wire and catheter exchanged for 8Fr Enroute NPS venous sheath. Secured to skin.    Vascular ultrasound used to identify carotid artery, CCA >=6mm, no significant plaque at puncture site, bifurcation marked on skin.  transverse incision made base of RIGHT neck. 8000 units IV heparin given to maintain ACT around 300.  Dissection down to common carotid artery, 3cm exposed, controlled  with vessel loop.  5-0 Prolene pursestring suture placed anterior CCA.  Traction on CCA loop. CCA cannulated via modified Seldinger technique with micropuncture needle, exchanged 4Fr catheter.  Carotid cerebral arteriogram performed.    RIGHT common carotid artery:  patent with mild irregularities  RIGHT internal carotid artery:  with focal stenosis 95%  RIGHT external carotid artery:  patent with mild irregularities  No significant intracranial stenoses visualized.    0.035 J-tip extra support guidewire placed, stopped short of bifurcation. Exchanged 8Fr Enroute sheath.  Distal sheath location in central lumen confirmed with AP and oblique contrast injections. Flow reversal established with Enroute NPS system.  Proximal CCA occluded with profunda clamp. Exchanged 0.014 SR guidewire, lesion crossed with mild difficulty, tip at siphon turn.     Exchanged 6.5x30mm Sharath balloon placed across carotid artery lesion for predilation, inflated to full effacement 6atm with inflator. Reverse flow x2min.  Exchanged [id number] Enroute stent deployed across lesion without difficulty.  Reverse flow x2min. Completion arteriogram shows no significant residual stenosis. No evidence obstruction, dissection or extravasation.    Wire, catheter removed. CCA clamp removed, antegrade flow re-established.  Enroute NPS system disconnected, blood returned thru venous sheath.  Venous sheath removed, manual pressure held with good hemostasis.  Arterial sheath removed, repaired with 5-0 Prolene pursestring suture.     20 mg protamine given with return of ACT to baseline.  Hemostasis obtained.  SNoW used.  Incision closed in layers interrupted 3-0 Vicryl platysma, 4-0 Monocryl skin, Dermabond tape.  Awoke from anesthesia moving all extremities, no focal neurologic deficits.  Patient tolerated procedure well, transferred to PACU in stable condition.          This document has been electronically signed by Kavon Robison MD on March 5,  2021 09:06 CST

## 2021-03-06 ENCOUNTER — READMISSION MANAGEMENT (OUTPATIENT)
Dept: CALL CENTER | Facility: HOSPITAL | Age: 71
End: 2021-03-06

## 2021-03-06 VITALS
SYSTOLIC BLOOD PRESSURE: 118 MMHG | HEART RATE: 76 BPM | TEMPERATURE: 97.4 F | RESPIRATION RATE: 16 BRPM | WEIGHT: 143.08 LBS | OXYGEN SATURATION: 96 % | HEIGHT: 72 IN | DIASTOLIC BLOOD PRESSURE: 56 MMHG | BODY MASS INDEX: 19.38 KG/M2

## 2021-03-06 PROCEDURE — 94799 UNLISTED PULMONARY SVC/PX: CPT

## 2021-03-06 PROCEDURE — 99024 POSTOP FOLLOW-UP VISIT: CPT | Performed by: NURSE PRACTITIONER

## 2021-03-06 PROCEDURE — 94760 N-INVAS EAR/PLS OXIMETRY 1: CPT

## 2021-03-06 RX ORDER — ACETAMINOPHEN 325 MG/1
650 TABLET ORAL EVERY 4 HOURS PRN
Qty: 60 TABLET | Refills: 0 | Status: SHIPPED | OUTPATIENT
Start: 2021-03-06

## 2021-03-06 RX ORDER — ONDANSETRON 4 MG/1
4 TABLET, FILM COATED ORAL EVERY 6 HOURS PRN
Qty: 30 TABLET | Refills: 0 | Status: SHIPPED | OUTPATIENT
Start: 2021-03-06 | End: 2023-01-27

## 2021-03-06 RX ORDER — AMOXICILLIN 250 MG
2 CAPSULE ORAL 2 TIMES DAILY PRN
Start: 2021-03-06 | End: 2023-01-27

## 2021-03-06 RX ADMIN — ATORVASTATIN CALCIUM 80 MG: 40 TABLET, FILM COATED ORAL at 08:30

## 2021-03-06 RX ADMIN — CLOPIDOGREL BISULFATE 75 MG: 75 TABLET ORAL at 08:31

## 2021-03-06 RX ADMIN — SODIUM CHLORIDE 75 ML/HR: 9 INJECTION, SOLUTION INTRAVENOUS at 04:31

## 2021-03-06 RX ADMIN — DILTIAZEM HYDROCHLORIDE 300 MG: 180 CAPSULE, COATED, EXTENDED RELEASE ORAL at 08:31

## 2021-03-06 RX ADMIN — HYDRALAZINE HYDROCHLORIDE 50 MG: 50 TABLET ORAL at 08:30

## 2021-03-06 RX ADMIN — ALBUTEROL SULFATE 2.5 MG: 2.5 SOLUTION RESPIRATORY (INHALATION) at 08:10

## 2021-03-06 RX ADMIN — ASPIRIN 81 MG: 81 TABLET, COATED ORAL at 08:31

## 2021-03-06 NOTE — PROGRESS NOTES
"  Cardiothoracic - Vascular Surgery Daily Note        LOS: 1 day   Patient Care Team:  Stephanie Mai MD as PCP - General ([facility])  Lela Cochran MD as Resident (Family Medicine)  Freddy Willett MD as Resident (Family Medicine)  Sher Escobar MD as Resident (Family Medicine)  Stephanie Mai MD as Resident (Family Medicine)    Chief Complaint: Carotid Stenosis      Subjective     The following portions of the patient's history were reviewed and updated as appropriate: allergies, current medications, past family history, past medical history, past social history, past surgical history and problem list.     Subjective:  Symptoms:  Stable.  No shortness of breath or chest pain.    Diet:  Adequate intake.    Activity level: Normal.    Pain:  He complains of pain that is mild.  He reports pain is improving.  Pain is well controlled.          Objective     Vital Signs  Temp:  [97.1 °F (36.2 °C)-97.6 °F (36.4 °C)] 97.4 °F (36.3 °C)  Heart Rate:  [50-75] 56  Resp:  [12-20] 20  BP: (111-154)/(49-69) 125/60  Arterial Line BP: (118-161)/(40-65) 151/65  Body mass index is 19.4 kg/m².    Intake/Output Summary (Last 24 hours) at 3/6/2021 0808  Last data filed at 3/6/2021 0500  Gross per 24 hour   Intake 3114 ml   Output 1325 ml   Net 1789 ml     No intake/output data recorded.    Wt Readings from Last 3 Encounters:   03/05/21 64.9 kg (143 lb 1.3 oz)   03/01/21 66.1 kg (145 lb 12.8 oz)   02/25/21 63.8 kg (140 lb 9.6 oz)         Physical Exam   Objective:  General Appearance:  Comfortable, in no acute distress, not in pain and well-appearing.    Vital signs: (most recent): Blood pressure 125/60, pulse 56, temperature 97.4 °F (36.3 °C), temperature source Oral, resp. rate 20, height 182.9 cm (72\"), weight 64.9 kg (143 lb 1.3 oz), SpO2 97 %.  Vital signs are normal.  No fever.    Output: Producing urine and no stool output.    HEENT: Normal HEENT exam.    Lungs:  Normal effort and normal respiratory rate.  " Breath sounds clear to auscultation.    Heart: Normal rate.  Regular rhythm.  (Occasional bradycardia)  Chest: No chest wall tenderness.    Abdomen: Abdomen is soft.  Bowel sounds are normal.   There is no abdominal tenderness.     Extremities: Normal range of motion.    Pulses: Distal pulses are intact.  There are no decreased pulses.    Neurological: Patient is alert and oriented to person, place and time.  GCS score is 15.    Pupils:  Pupils are equal, round, and reactive to light.  Pupils are equal.   Skin:  Warm and dry.  (R subclavian incision CDI  Groin site CDI no hematoma)              Results Review:    Lab Results   Component Value Date    WBC 4.90 03/02/2021    HGB 9.6 (L) 03/02/2021    HCT 31.3 (L) 03/02/2021    MCV 70.3 (L) 03/02/2021     03/02/2021     Lab Results   Component Value Date    GLUCOSE 89 02/25/2021    BUN 15 02/25/2021    CREATININE 1.38 (H) 02/25/2021    EGFRIFAFRI 62 02/25/2021    BCR 10.9 02/25/2021    K 4.1 02/25/2021    CO2 20.0 (L) 02/25/2021    CALCIUM 9.4 02/25/2021    ALBUMIN 3.80 02/25/2021    AST 16 02/25/2021    ALT 8 02/25/2021       Calcium No results found for: CALCIUM   Magnesium No results found for: MG     Imaging Results (Last 24 Hours)     Procedure Component Value Units Date/Time    FL C Arm During Surgery [[id number]] Resulted: 03/05/21 1424     Updated: 03/05/21 1424                              albuterol, 2.5 mg, Nebulization, Q8H - RT  aspirin, 81 mg, Oral, Daily  atorvastatin, 80 mg, Oral, Daily  beer, 1 each, Oral, 4x Daily With Meals & Nightly  clopidogrel, 75 mg, Oral, Daily  dilTIAZem CD, 300 mg, Oral, Daily  hydrALAZINE, 50 mg, Oral, BID      nitroglycerin, 5-200 mcg/min  sodium chloride, 75 mL/hr, Last Rate: 75 mL/hr (03/06/21 0431)              ASSESSMENT/PLAN     Satisfactory Post op  Progressing well, up in chair, well appearing, tolerating PO.     Carotid Stenosis  DAPT, Statin, CCB  Status post R TCAR.  No new deficits  Plan repeat duplex in 4  weeks     Post operative pain  Controlled acetaminophen     Rehab  Up ad alfred     Respiratory  NEBS PRN, room air IS/OPEP     Disposition  Stable for discharge home one week follow up [facility] APRN        This document has been electronically signed by MICHAEL Martinez @  On March 6, 2021 08:08 CST

## 2021-03-06 NOTE — DISCHARGE SUMMARY
CVTS DISCHARGE SUMMARY  Date of Admission: 3/5/2021  5:30 AM  Date of Discharge:  3/6/2021    Admission Diagnosis:   Carotid stenosis, asymptomatic, bilateral [I65.23]    Discharge Diagnosis:   Post-Op Diagnosis Codes:     * Carotid stenosis, asymptomatic, bilateral [I65.23]    Consults:   Consults     No orders found for last 30 day(s).          Procedures Performed  Procedure(s):  RIGHT TRANSCAROTID ARTERY REVASCULARIZATION carotid cerebral arteriogram       Discharge Medications     Discharge Medications      New Medications      Instructions Start Date   acetaminophen 325 MG tablet  Commonly known as: TYLENOL   650 mg, Oral, Every 4 Hours PRN      ondansetron 4 MG tablet  Commonly known as: ZOFRAN   4 mg, Oral, Every 6 Hours PRN      sennosides-docusate 8.6-50 MG per tablet  Commonly known as: PERICOLACE   2 tablets, Oral, 2 Times Daily PRN         Changes to Medications      Instructions Start Date   hydrALAZINE 50 MG tablet  Commonly known as: APRESOLINE  What changed: when to take this   TAKE 1 TABLET BY MOUTH TWICE DAILY         Continue These Medications      Instructions Start Date   aspirin 81 MG EC tablet   81 mg, Oral, Daily      atorvastatin 80 MG tablet  Commonly known as: LIPITOR   80 mg, Oral, Daily      clopidogrel 75 MG tablet  Commonly known as: PLAVIX   75 mg, Oral, Daily      dilTIAZem  MG 24 hr capsule  Commonly known as: CARDIZEM CD   300 mg, Oral, Daily      ferrous sulfate 324 MG tablet delayed-release   324 mg, Oral, Daily With Breakfast           Discharge Diet:   Diet Instructions     Diet: Regular, Consistent Carbohydrate, Cardiac      Discharge Diet:  Regular  Consistent Carbohydrate  Cardiac             Discharge Disposition: Home in stable condition with follow up appointments with CVTS Nurse Practitioner 1 week,  Cardiology/PCP as scheduled.     History of Present Illness/Hospital Course:   70 y.o. male with HTN(stable, increased risk stroke, rupture), Hyperlipidemia(stable,  increased risk cardiovascular events) and Smoker(uncontrolled, increased risk cardiovascular events) BPH, PVD (stable, increased risk cardiovascular events).  smokes 1 PPD.  Established with [facility] in 2013 with lifestyle limiting claudication, prior R SFA stent occluded.  Underwent bypass which occluded the following year requiring lysis.  Unfortunately he continues to smoke.  No complaints of claudication at today's visit.  No other associated signs, symptoms or modifying factors. Returns for CTA results.     2012 RIGHT SFA stent  2013 RIGHT femoral to popliteal artery bypass (PTFE)  12/15/2014 RIGHT lower extremity angiogram:  RIGHT fem-pop thrombolysis, EKOS.  12/2020 Restudy: Graft patent  11/22/2016 RANGEL:  RIGHT 1.1 triphasic.  LEFT 1.1 triphasic.  Graft 169cm/s, triphasic.  5/22/2017 RANGEL:  RIGHT 1.1 triphasic.  LEFT 1.0 triphasic.  Graft 186cm/s, triphasic.  12/06/17   RANGEL:  RIGHT 1.12  Triphasic.  LEFT 0.99  Triphasic.  Graft Patent 237 cm/s, triphasic.  06/07/18  RANGEL:  RIGHT 0.99  Triphasic.  LEFT 0.98  Triphasic.   12/18/18  RANGEL: Right 1.04 Fem triphasic Poptriphasic DP triphasic PT triphasic, Left 0.95 Fem triphasic Poptriphasic DP triphasic PT triphasic  12/18/18  U/S Graft Survey: RT PTFE bypass patent mPSV 236 at proximal anastomosis   7/16/19 RANGEL: Right 1.07 triphasic.  Left 0.99 triphasic.  7/2020 RANGEL: Right 1.0 triphasic.  Left 1.05 triphasic.   7/2020 Right Graft US: Patent, [id number] proximal anastamosis  1/2021 RANGEL: Right 0.9 triphasic.  Left 0.94 triphasic.      7/2020 Carotid Duplex: GELACIO 50-69% mPSV 133c/s Ratio 1.3, LICA 50-69% mPSV 139c/s Ratio 0.8, Antegrade vertebrals  1/2021 Carotid Duplex: GELACIO >70% mPSV 235c/s Ratio 1.9, LICA 50-69% mPSV 154c/s Ratio 1.2, Antegrade vertebrals  1/27/2021:CTA Carotids: GELACIO 80% RSCA 60% LICA 40%     Adequate preop studies were completed and the patient was scheduled electively. Operative day Dwight Ryder admitted through [facility], taken to operating suite  "and placed under general anesthesia.  Underwent said procedure without complications or difficulty. They were weaned easily from mechanical ventilation and extubated in the recovery room placed on oxygen per nasal cannula and further transferred to CCU for further care and monitoring. Dwight Ryder remained hemodynamically and neurovascularly stable immediately postop.  POD1 they were out of the bed to the chair tolerating breakfast without any difficulty swallowing.  Incisions and vital signs are stable for discharge home today in satisfactory condition.       Vital Signs  Temp:  [97.1 °F (36.2 °C)-97.6 °F (36.4 °C)] 97.4 °F (36.3 °C)  Heart Rate:  [50-75] 54  Resp:  [12-20] 20  BP: (111-154)/(49-69) 125/60  Arterial Line BP: (118-161)/(40-65) 151/65      03/05/21  0551   Weight: 64.9 kg (143 lb 1.3 oz)       Pertinent Test Results:  Lab Results   Component Value Date    WBC 4.90 03/02/2021    HGB 9.6 (L) 03/02/2021    HCT 31.3 (L) 03/02/2021    MCV 70.3 (L) 03/02/2021     03/02/2021     Lab Results   Component Value Date    GLUCOSE 89 02/25/2021    BUN 15 02/25/2021    CREATININE 1.38 (H) 02/25/2021    EGFRIFAFRI 62 02/25/2021    BCR 10.9 02/25/2021    K 4.1 02/25/2021    CO2 20.0 (L) 02/25/2021    CALCIUM 9.4 02/25/2021    ALBUMIN 3.80 02/25/2021    AST 16 02/25/2021    ALT 8 02/25/2021     Physical Exam   Objective:  General Appearance:  Comfortable, in no acute distress, not in pain and well-appearing.    Vital signs: (most recent): Blood pressure 125/60, pulse 56, temperature 97.4 °F (36.3 °C), temperature source Oral, resp. rate 20, height 182.9 cm (72\"), weight 64.9 kg (143 lb 1.3 oz), SpO2 97 %.  Vital signs are normal.  No fever.    Output: Producing urine and no stool output.    HEENT: Normal HEENT exam.    Lungs:  Normal effort and normal respiratory rate.  Breath sounds clear to auscultation.    Heart: Normal rate.  Regular rhythm.  (Occasional bradycardia)  Chest: No chest wall tenderness.  "   Abdomen: Abdomen is soft.  Bowel sounds are normal.   There is no abdominal tenderness.     Extremities: Normal range of motion.    Pulses: Distal pulses are intact.  There are no decreased pulses.    Neurological: Patient is alert and oriented to person, place and time.  GCS score is 15.    Pupils:  Pupils are equal, round, and reactive to light.  Pupils are equal.   Skin:  Warm and dry.  (R subclavian incision CDI  Groin site CDI no hematoma)    Additional Instructions for the Follow-ups that You Need to Schedule     Call MD With Problems / Concerns   As directed      Instructions: Instructions: notify provider for uncontrolled pain, shortness of breath, fever or chills, purulent drainage or swelling from the surgical incision site.    Business hours call [phone]    After business hours may call hospital and speak to surgeon on call [phone]       If you should experience any neurological symptoms including but not limited to visual or speech disturbances confusion, seizures, or weakness of limbs of one side of your body notify [facility] V[facility] center immediately for evaluation or if after hours present to the nearest Emergency Department.    Order Comments: Instructions: Instructions: notify provider for uncontrolled pain, shortness of breath, fever or chills, purulent drainage or swelling from the surgical incision site.  Business hours call [phone]  After business hours may call hospital and speak to surgeon on call [phone]   If you should experience any neurological symptoms including but not limited to visual or speech disturbances confusion, seizures, or weakness of limbs of one side of your body notify [facility] Vascular center immediately for evaluation or if after hours present to the nearest Emergency Department.          Discharge Follow-up with PCP   As directed       Currently Documented PCP:    Stephanie Mai MD    PCP Phone Number:    [phone]     Follow Up Details: as  scheduled         Discharge Follow-up with Specified Provider: Keyona BUTTERFIELD; 1 Week   As directed      To: Keyona george/Landon BUTTERFIELD    Follow Up: 1 Week               Discharge Instructions: Discharge instructions include no heavy lifting anything greater than 10lbs for approximately 1 week.  No sex or driving for 1-2 weeks. Printed information given to the patient with advancement of activities weekly.  Risks and benefits of narcotic medications and weaning postoperatively have been discussed. Clean operative site with antibacterial soap/water, pat dry. Keep open to air unless draining, then may apply dry dressing.  No ointments or creams unless prescribed by provider. Signs and symptoms of infection including drainage from operative site, redness, swelling, with associated fever and/or chills notify [facility] V[facility] center immediately for wound check. If you should experience any neurological symptoms including but not limited to visual or speech disturbances confusion, seizures, or weakness of limbs of one side of your body notify [facility] V[facility] center immediately for evaluation or if after hours present to the nearest Emergency Department.  Patient verbalizes understanding of discharge instructions, all questions are answered, follow up appointments have been made, they are discharged home in stable condition.           Follow-up Appointments  Future Appointments   Date Time Provider Department Center   3/19/2021  8:25 AM C19 VACCINE CLIN MAD 2ND DOSE [facility]       Test Results Pending at Discharge           This document has been electronically signed by YOVANA Martinez-BC @  On March 6, 2021 08:15 CST      Time: Discharge 45 min

## 2021-03-06 NOTE — OUTREACH NOTE
Medical Week 2 Survey      Responses   [facility] patient discharged from?  [place]   Does the patient have one of the following disease processes/diagnoses(primary or secondary)?  Other   Week 2 attempt successful?  No   Revoke  Readmitted          Julieta Arias RN

## 2021-03-06 NOTE — OUTREACH NOTE
Prep Survey      Responses   [facility] patient discharged from?  [place]   Is LACE score < 7 ?  No   Emergency Room discharge w/ pulse ox?  No   Eligibility  [facility]   Date of Admission  03/05/21   Date of Discharge  03/06/21   Discharge Disposition  Home or Self Care   Discharge diagnosis  RIGHT TRANSCAROTID ARTERY REVASCULARIZATION    Does the patient have one of the following disease processes/diagnoses(primary or secondary)?  Other   Does the patient have Home health ordered?  No   Is there a DME ordered?  No   Prep survey completed?  Yes          Julieta Arias RN

## 2021-03-08 ENCOUNTER — TRANSITIONAL CARE MANAGEMENT TELEPHONE ENCOUNTER (OUTPATIENT)
Dept: CALL CENTER | Facility: HOSPITAL | Age: 71
End: 2021-03-08

## 2021-03-08 NOTE — OUTREACH NOTE
Call Center TCM Note      Responses   [facility] patient discharged from?  [place]   Does the patient have one of the following disease processes/diagnoses(primary or secondary)?  Other   TCM attempt successful?  Yes   Call start time  1154   Call end time  1156   Discharge diagnosis  RIGHT TRANSCAROTID ARTERY REVASCULARIZATION    Meds reviewed with patient/caregiver?  Yes   Is the patient having any side effects they believe may be caused by any medication additions or changes?  No   Does the patient have all medications ordered at discharge?  Yes   Is the patient taking all medications as directed (includes completed medication regime)?  Yes   Does the patient have a primary care provider?   Yes   Does the patient have an appointment with their PCP within 7 days of discharge?  Yes   Comments regarding PCP  Hospital d/c /fu appt is on 3/15/21 at 3:00 pm    Has the patient kept scheduled appointments due by today?  N/A   Psychosocial issues?  No   Did the patient receive a copy of their discharge instructions?  Yes   Nursing interventions  Reviewed instructions with patient   What is the patient's perception of their health status since discharge?  New symptoms unrelated to diagnosis [Pt reports he has dark stool that started on 3/7/21]   Is the patient/caregiver able to teach back signs and symptoms related to disease process for when to call PCP?  Yes   Is the patient/caregiver able to teach back signs and symptoms related to disease process for when to call 911?  Yes   Is the patient/caregiver able to teach back the hierarchy of who to call/visit for symptoms/problems? PCP, Specialist, Home health nurse, Urgent Care, ED, 911  Yes   TCM call completed?  Yes          Heidi Guzman RN    3/8/2021, 11:56 EST

## 2021-03-15 ENCOUNTER — READMISSION MANAGEMENT (OUTPATIENT)
Dept: CALL CENTER | Facility: HOSPITAL | Age: 71
End: 2021-03-15

## 2021-03-15 ENCOUNTER — OFFICE VISIT (OUTPATIENT)
Dept: FAMILY MEDICINE CLINIC | Facility: CLINIC | Age: 71
End: 2021-03-15

## 2021-03-15 ENCOUNTER — LAB (OUTPATIENT)
Dept: LAB | Facility: HOSPITAL | Age: 71
End: 2021-03-15

## 2021-03-15 VITALS
HEIGHT: 72 IN | BODY MASS INDEX: 19.37 KG/M2 | OXYGEN SATURATION: 99 % | HEART RATE: 69 BPM | SYSTOLIC BLOOD PRESSURE: 130 MMHG | TEMPERATURE: 97.1 F | WEIGHT: 143 LBS | DIASTOLIC BLOOD PRESSURE: 90 MMHG

## 2021-03-15 DIAGNOSIS — D50.9 IRON DEFICIENCY ANEMIA, UNSPECIFIED IRON DEFICIENCY ANEMIA TYPE: ICD-10-CM

## 2021-03-15 DIAGNOSIS — Z09 HOSPITAL DISCHARGE FOLLOW-UP: Primary | ICD-10-CM

## 2021-03-15 DIAGNOSIS — I65.23 CAROTID STENOSIS, ASYMPTOMATIC, BILATERAL: ICD-10-CM

## 2021-03-15 PROCEDURE — 99495 TRANSJ CARE MGMT MOD F2F 14D: CPT | Performed by: STUDENT IN AN ORGANIZED HEALTH CARE EDUCATION/TRAINING PROGRAM

## 2021-03-15 PROCEDURE — 36415 COLL VENOUS BLD VENIPUNCTURE: CPT

## 2021-03-15 PROCEDURE — 85025 COMPLETE CBC W/AUTO DIFF WBC: CPT

## 2021-03-15 NOTE — OUTREACH NOTE
Medical Week 2 Survey      Responses   [facility] patient discharged from?  [place]   Does the patient have one of the following disease processes/diagnoses(primary or secondary)?  Other   Week 2 attempt successful?  Yes   Call start time  1411   Call end time  1414   Meds reviewed with patient/caregiver?  Yes   Is the patient having any side effects they believe may be caused by any medication additions or changes?  No   Does the patient have all medications ordered at discharge?  Yes   Is the patient taking all medications as directed (includes completed medication regime)?  Yes   Does the patient have a primary care provider?   Yes   Does the patient have an appointment with their PCP within 7 days of discharge?  Yes   Has the patient kept scheduled appointments due by today?  N/A   Comments  States plans to keep appt with PCP this afternoon.   Has home health visited the patient within 72 hours of discharge?  N/A   Psychosocial issues?  No   Did the patient receive a copy of their discharge instructions?  Yes   What is the patient's perception of their health status since discharge?  Improving   Is the patient/caregiver able to teach back signs and symptoms related to disease process for when to call PCP?  Yes   Is the patient/caregiver able to teach back signs and symptoms related to disease process for when to call 911?  Yes   Is the patient/caregiver able to teach back the hierarchy of who to call/visit for symptoms/problems? PCP, Specialist, Home health nurse, Urgent Care, ED, 911  Yes   Week 2 Call Completed?  Yes   Wrap up additional comments  States is doing well-denies any s/s of infection-states incision healing well. States plans to see PCP this afternoon and would appreciate one more follow up call.           Patricia Brady RN

## 2021-03-16 LAB
BASOPHILS # BLD AUTO: 0.07 10*3/MM3 (ref 0–0.2)
BASOPHILS NFR BLD AUTO: 1.1 % (ref 0–1.5)
DEPRECATED RDW RBC AUTO: 61.9 FL (ref 37–54)
EOSINOPHIL # BLD AUTO: 0.08 10*3/MM3 (ref 0–0.4)
EOSINOPHIL NFR BLD AUTO: 1.3 % (ref 0.3–6.2)
ERYTHROCYTE [DISTWIDTH] IN BLOOD BY AUTOMATED COUNT: 25.9 % (ref 12.3–15.4)
HCT VFR BLD AUTO: 30.7 % (ref 37.5–51)
HGB BLD-MCNC: 9.9 G/DL (ref 13–17.7)
LYMPHOCYTES # BLD AUTO: 1.28 10*3/MM3 (ref 0.7–3.1)
LYMPHOCYTES NFR BLD AUTO: 20.7 % (ref 19.6–45.3)
MCH RBC QN AUTO: 22.9 PG (ref 26.6–33)
MCHC RBC AUTO-ENTMCNC: 32.2 G/DL (ref 31.5–35.7)
MCV RBC AUTO: 70.9 FL (ref 79–97)
MONOCYTES # BLD AUTO: 1.02 10*3/MM3 (ref 0.1–0.9)
MONOCYTES NFR BLD AUTO: 16.5 % (ref 5–12)
NEUTROPHILS NFR BLD AUTO: 3.69 10*3/MM3 (ref 1.7–7)
NEUTROPHILS NFR BLD AUTO: 59.9 % (ref 42.7–76)
PLATELET # BLD AUTO: 412 10*3/MM3 (ref 140–450)
PMV BLD AUTO: 10.8 FL (ref 6–12)
RBC # BLD AUTO: 4.33 10*6/MM3 (ref 4.14–5.8)
WBC # BLD AUTO: 6.17 10*3/MM3 (ref 3.4–10.8)

## 2021-03-17 ENCOUNTER — OFFICE VISIT (OUTPATIENT)
Dept: CARDIAC SURGERY | Facility: CLINIC | Age: 71
End: 2021-03-17

## 2021-03-17 VITALS
SYSTOLIC BLOOD PRESSURE: 130 MMHG | HEIGHT: 72 IN | OXYGEN SATURATION: 100 % | DIASTOLIC BLOOD PRESSURE: 58 MMHG | WEIGHT: 142 LBS | HEART RATE: 68 BPM | BODY MASS INDEX: 19.23 KG/M2 | TEMPERATURE: 98.1 F

## 2021-03-17 DIAGNOSIS — Z72.0 TOBACCO USE: ICD-10-CM

## 2021-03-17 DIAGNOSIS — I65.23 CAROTID STENOSIS, ASYMPTOMATIC, BILATERAL: Primary | ICD-10-CM

## 2021-03-17 DIAGNOSIS — Z48.812 SURGICAL AFTERCARE, CIRCULATORY SYSTEM: ICD-10-CM

## 2021-03-17 PROCEDURE — 99024 POSTOP FOLLOW-UP VISIT: CPT | Performed by: NURSE PRACTITIONER

## 2021-03-17 NOTE — PATIENT INSTRUCTIONS
Clean operative site with antibacterial soap/water, pat dry. Keep open to air unless draining, then may apply dry dressing.  No ointments or creams unless prescribed by provider.    Return 3 weeks right carotid ultrasound.

## 2021-03-17 NOTE — PROGRESS NOTES
I was present onsite throughout the encounter.  Patient is a 70-year-old male here today for post hospital TCM visit.  I spoke at length with Dr. HAYDEN Mai regarding the patient's presentation and plan of care today.  Please see Dr. HAYDEN Mai's notes for more detailed information regarding this encounter.  I have reviewed the notes, assessments, and/or procedures performed by Stephanie Mai MD, I concur with her/his documentation and assessment and plan for Dwight Rosa Ryder.                This document has been electronically signed by Liban Mandujano MD on March 17, 2021 14:40 CDT

## 2021-03-17 NOTE — PROGRESS NOTES
Transitional Care Follow Up Visit  Subjective     Dwight Ryder is a 70 y.o. male who presents for a transitional care management visit.    Within 48 business hours after discharge our office contacted him via telephone to coordinate his care and needs.      I reviewed and discussed the details of that call along with the discharge summary, hospital problems, inpatient lab results, inpatient diagnostic studies, and consultation reports with Dwight.     Current outpatient and discharge medications have been reconciled for the patient.  Reviewed by: Stephanie Mai MD      Date of TCM Phone Call 3/6/2021   [facility]   Date of Admission 3/5/2021   Date of Discharge 3/6/2021   Discharge Disposition Home or Self Care     Risk for Readmission (LACE) Score: 11 (3/6/2021  5:01 AM)      History of Present Illness   Course During Hospital Stay: Admitted from 3/5-3/6 for right transcarotid artery revascularization.  Patient tolerated procedure well without any issues.    Patient started iron for iron deficiency anemia approximately 3 weeks ago.  Has reported some issues with constipation.  MiraLAX has been helping some.  Occasional dark stools.  No harriet blood noted.  Denies any dizziness, shortness of air, palpitations or fatigue.     The following portions of the patient's history were reviewed and updated as appropriate: allergies, current medications, past family history, past medical history, past social history, past surgical history and problem list.    Review of Systems   Constitutional: Negative for activity change, appetite change, chills, fatigue and fever.   HENT: Negative for congestion, rhinorrhea, sinus pain and sore throat.    Eyes: Negative for pain, redness and visual disturbance.   Respiratory: Negative for cough, shortness of breath and wheezing.    Cardiovascular: Negative for chest pain, palpitations and leg swelling.   Gastrointestinal: Positive for constipation. Negative  for abdominal pain, diarrhea, nausea and vomiting.   Genitourinary: Negative for dysuria and flank pain.   Musculoskeletal: Negative for arthralgias, joint swelling and myalgias.   Skin: Negative for color change, pallor and rash.   Neurological: Negative for dizziness, numbness and headaches.   Psychiatric/Behavioral: Negative for dysphoric mood and sleep disturbance. The patient is not nervous/anxious.        Objective   Physical Exam  Constitutional:       General: He is not in acute distress.     Appearance: Normal appearance. He is well-developed. He is not diaphoretic.   HENT:      Head: Normocephalic and atraumatic.      Right Ear: Hearing normal.      Left Ear: Hearing normal.   Eyes:      General: Lids are normal.      Conjunctiva/sclera: Conjunctivae normal.      Pupils: Pupils are equal, round, and reactive to light.   Neck:     Cardiovascular:      Rate and Rhythm: Normal rate and regular rhythm.      Heart sounds: Normal heart sounds, S1 normal and S2 normal. No murmur heard.     Pulmonary:      Effort: Pulmonary effort is normal. No respiratory distress.      Breath sounds: Normal breath sounds. No wheezing or rales.   Abdominal:      General: Bowel sounds are normal. There is no distension.      Palpations: Abdomen is soft.      Tenderness: There is no abdominal tenderness. There is no guarding.   Musculoskeletal:         General: No tenderness or deformity. Normal range of motion.      Cervical back: Normal range of motion and neck supple.   Skin:     General: Skin is warm and dry.      Coloration: Skin is not pale.      Findings: No erythema or rash.   Neurological:      Mental Status: He is alert and oriented to person, place, and time. He is not disoriented.   Psychiatric:         Speech: Speech normal.         Behavior: Behavior normal.         Assessment/Plan   Diagnoses and all orders for this visit:    1. Hospital discharge follow-up (Primary)    2. Carotid stenosis, asymptomatic,  bilateral    3. Iron deficiency anemia, unspecified iron deficiency anemia type  -     CBC w AUTO Differential; Future      Surgical incision site clean, dry, intact.  Healing appropriately.  Keep follow-up with plastic surgery on 3/17.  Occasional dark stool likely related to constipation.  We will get a CBC today to repeat hemoglobin levels.  Advised patient to continue using MiraLAX and can use Colace if needed.  If significant issues with constipation, consider IV iron.      Stephanie Mai M.D. PGY2  [facility]  [address] [place] [address]  Office: [phone]  This document has been electronically signed by Stephanie Mai MD on March 17, 2021 14:04 CDT

## 2021-03-18 DIAGNOSIS — I10 ESSENTIAL HYPERTENSION: ICD-10-CM

## 2021-03-18 DIAGNOSIS — I73.9 PERIPHERAL VASCULAR DISEASE (HCC): ICD-10-CM

## 2021-03-18 RX ORDER — DILTIAZEM HYDROCHLORIDE 300 MG/1
300 CAPSULE, COATED, EXTENDED RELEASE ORAL DAILY
Qty: 90 CAPSULE | Refills: 0 | Status: SHIPPED | OUTPATIENT
Start: 2021-03-18 | End: 2021-12-01

## 2021-03-18 RX ORDER — CLOPIDOGREL BISULFATE 75 MG/1
75 TABLET ORAL DAILY
Qty: 90 TABLET | Refills: 0 | Status: SHIPPED | OUTPATIENT
Start: 2021-03-18 | End: 2021-06-30 | Stop reason: SDUPTHER

## 2021-03-18 RX ORDER — ATORVASTATIN CALCIUM 80 MG/1
80 TABLET, FILM COATED ORAL DAILY
Qty: 90 TABLET | Refills: 0 | Status: SHIPPED | OUTPATIENT
Start: 2021-03-18 | End: 2021-06-30 | Stop reason: SDUPTHER

## 2021-03-18 NOTE — PROGRESS NOTES
CVTS Office Progress Note     Dwight Ryder  1950    Chief Complaint:    Chief Complaint   Patient presents with   • Carotid Artery Disease       HPI:      PCP:  Stephanie Mai MD    70 y.o. male with HTN(stable, increased risk stroke, rupture), Hyperlipidemia(stable, increased risk cardiovascular events) and Smoker(uncontrolled, increased risk cardiovascular events) BPH, PVD (stable, increased risk cardiovascular events).  smokes 1 PPD.  Established with CTVS in 2013 with lifestyle limiting claudication, prior R SFA stent occluded.  Underwent bypass which occluded the following year requiring lysis.  Unfortunately he continues to smoke.  No complaints of claudication at today's visit.  No other associated signs, symptoms or modifying factors.  Underwent R TCAR, no new deficits, did well post operatively.     2012 RIGHT SFA stent  2013 RIGHT femoral to popliteal artery bypass (PTFE)  12/15/2014 RIGHT lower extremity angiogram:  RIGHT fem-pop thrombolysis, EKOS.  12/2020 Restudy: Graft patent  11/22/2016 RANGEL:  RIGHT 1.1 triphasic.  LEFT 1.1 triphasic.  Graft 169cm/s, triphasic.  5/22/2017 RANGEL:  RIGHT 1.1 triphasic.  LEFT 1.0 triphasic.  Graft 186cm/s, triphasic.  12/06/17   RANGEL:  RIGHT 1.12  Triphasic.  LEFT 0.99  Triphasic.  Graft Patent 237 cm/s, triphasic.  06/07/18  RANGEL:  RIGHT 0.99  Triphasic.  LEFT 0.98  Triphasic.   12/18/18  RANGEL: Right 1.04 Fem triphasic Poptriphasic DP triphasic PT triphasic, Left 0.95 Fem triphasic Poptriphasic DP triphasic PT triphasic  12/18/18  U/S Graft Survey: RT PTFE bypass patent mPSV 236 at proximal anastomosis   7/16/19 RANGEL: Right 1.07 triphasic.  Left 0.99 triphasic.  7/2020 RANGEL: Right 1.0 triphasic.  Left 1.05 triphasic.   7/2020 Right Graft US: Patent, [id number] proximal anastamosis  1/2021 RANGEL: Right 0.9 triphasic.  Left 0.94 triphasic.     7/2020 Carotid Duplex: GELACIO 50-69% mPSV 133c/s Ratio 1.3, LICA 50-69% mPSV 139c/s Ratio 0.8, Antegrade vertebrals  1/2021  Carotid Duplex: GELACIO >70% mPSV 235c/s Ratio 1.9, LICA 50-69% mPSV 154c/s Ratio 1.2, Antegrade vertebrals  1/27/2021:CTA Carotids: GELACIO 80% RSCA 60% LICA 40%  3/5/2021 R TCAR    The following portions of the patient's history were reviewed and updated as appropriate: allergies, current medications, past family history, past medical history, past social history, past surgical history and problem list.  Recent images independently reviewed.  Available laboratory values reviewed.    PMH:  Past Medical History:   Diagnosis Date   • Artificial lens present     right   • Astigmatism     myopic   • Atherosclerosis of native arteries of extremity with intermittent claudication (CMS/HCC)    • Benign essential hypertension    • Benign prostatic hyperplasia    • Cataract     R>L   • Corneal foreign body     Left eye, 2 years ago      • Essential hypertension    • Examination     individual health   • Exanthematous disorder    • Fracture of foot    • Hypercholesterolemia    • Hyperkalemia    • Hyperlipidemia    • Hypertensive disorder    • Male erectile disorder    • Need for vaccination    • Peripheral vascular disease (CMS/HCC)     Post RIGHT fem-pop bypass graft 1/7/13 Post thrombolysis to graft 12/15/14      • Peripheral vascular disease (CMS/HCC)     unspecified   • Posterior subcapsular polar senile cataract    • Surgical follow-up care     R fem->pop bypass 1/7/13      • Tobacco dependence syndrome    • Tobacco user    • Urticaria     will give kenalog and benadyl , will order allery test      • Visual discomfort      Past Surgical History:   Procedure Laterality Date   • ANGIOPLASTY      femoral-popliteal artery (dilation) (Abdominal aortogram, bilateral lower extremity runoff. Repair of left common femoral artery.)   11/26/2012    • EYE SURGERY     • FEMORAL POPLITEAL BYPASS      Right with 6 mm Olney-baron graft)   01/07/2013    • INJECTION OF MEDICATION  03/04/2013    kenalog(3)    • OTHER SURGICAL HISTORY      Remove  cataract, insert lens (Cataract extraction with intraocular lens implantation, right eye.)   11/26/2013    • VASCULAR SURGERY       Family History   Problem Relation Age of Onset   • Cancer Other    • Heart disease Other    • Hypertension Other    • Stroke Other      Social History     Tobacco Use   • Smoking status: Current Every Day Smoker     Packs/day: 1.50     Years: 30.00     Pack years: 45.00     Types: Cigarettes   • Smokeless tobacco: Never Used   Vaping Use   • Vaping Use: Never used   Substance Use Topics   • Alcohol use: Yes     Alcohol/week: 6.0 standard drinks     Types: 6 Cans of beer per week     Comment: daily   • Drug use: No       ALLERGIES:  Allergies   Allergen Reactions   • Lisinopril Angioedema         MEDICATIONS:    Current Outpatient Medications:   •  acetaminophen (TYLENOL) 325 MG tablet, Take 2 tablets by mouth Every 4 (Four) Hours As Needed for Mild Pain ., Disp: 60 tablet, Rfl: 0  •  aspirin 81 MG EC tablet, Take 1 tablet by mouth Daily., Disp: 30 tablet, Rfl: 1  •  ferrous sulfate 324 MG tablet delayed-release, Take 1 tablet by mouth Daily With Breakfast., Disp: 30 tablet, Rfl: 5  •  hydrALAZINE (APRESOLINE) 50 MG tablet, TAKE 1 TABLET BY MOUTH TWICE DAILY (Patient taking differently: Take 50 mg by mouth 2 (two) times a day.), Disp: 180 tablet, Rfl: 2  •  ondansetron (ZOFRAN) 4 MG tablet, Take 1 tablet by mouth Every 6 (Six) Hours As Needed for Nausea or Vomiting., Disp: 30 tablet, Rfl: 0  •  sennosides-docusate (senna-docusate sodium) 8.6-50 MG per tablet, Take 2 tablets by mouth 2 (Two) Times a Day As Needed for Constipation., Disp: , Rfl:   •  atorvastatin (LIPITOR) 80 MG tablet, TAKE 1 TABLET BY MOUTH DAILY, Disp: 90 tablet, Rfl: 0  •  clopidogrel (PLAVIX) 75 MG tablet, TAKE 1 TABLET BY MOUTH DAILY, Disp: 90 tablet, Rfl: 0  •  dilTIAZem CD (CARDIZEM CD) 300 MG 24 hr capsule, TAKE 1 CAPSULE BY MOUTH DAILY, Disp: 90 capsule, Rfl: 0      Review of Systems   Constitutional: Negative  "for chills, decreased appetite, fever and weight loss.   HENT: Negative for congestion, nosebleeds and sore throat.    Eyes: Negative for blurred vision, visual disturbance and visual halos.   Cardiovascular: Negative for chest pain, dyspnea on exertion and leg swelling.   Respiratory: Negative for cough, shortness of breath, sputum production and wheezing.    Endocrine: Negative for cold intolerance and polyuria.   Hematologic/Lymphatic: Negative for bleeding problem. Bruises/bleeds easily.   Skin: Positive for unusual hair distribution. Negative for flushing and nail changes.   Musculoskeletal: Positive for arthritis, back pain and joint pain.   Gastrointestinal: Negative for bloating, abdominal pain, hematemesis, melena, nausea and vomiting.   Genitourinary: Negative for flank pain and hematuria.   Neurological: Negative for brief paralysis, difficulty with concentration, focal weakness, light-headedness, loss of balance, numbness, paresthesias and weakness.   Psychiatric/Behavioral: Negative for altered mental status, depression, substance abuse and suicidal ideas.   Allergic/Immunologic: Negative for hives and persistent infections.         Vitals:    03/17/21 1006   BP: 130/58   BP Location: Left arm   Patient Position: Sitting   Cuff Size: Adult   Pulse: 68   Temp: 98.1 °F (36.7 °C)   TempSrc: Temporal   SpO2: 100%   Weight: 64.4 kg (142 lb)   Height: 182.9 cm (72\")     Physical Exam   Constitutional: He is oriented to person, place, and time. He appears well-developed.   HENT:   Head: Normocephalic and atraumatic.   Eyes: Pupils are equal, round, and reactive to light. Conjunctivae are normal.   Cardiovascular: Normal rate.   No murmur heard.  Pulmonary/Chest: Effort normal and breath sounds normal.   Abdominal: Soft. Bowel sounds are normal.   Musculoskeletal: Normal range of motion. No tenderness.   Neurological: He is alert and oriented to person, place, and time. No cranial nerve deficit.   Skin: Skin is " warm and dry. Capillary refill takes less than 2 seconds.   There is no evidence of skin breakdown of the bilateral lower extremities.  BLE pink, no evidence of ischemia.  Feet are absent of dependent rubor.   Psychiatric: Judgment normal.   Nursing note and vitals reviewed.      Assessment & Plan     Independent Review of Studies    1. Carotid stenosis, asymptomatic, bilateral  No new deficits, s/p R TCAR  Right carotid duplex in 3-4 weeks  ASA, Plavix, Statin    No reperfusion symptoms    - Duplex Carotid - Right Ultrasound CAR; Future    2. Tobacco use  Smoking cessation assistance options offered including behavioral counseling (Smoking Cessation Classes), Nicotine replacement therapy (patches or gum), pharmacologic therapy (Chantix, Wellbutrin). Understands tobacco increases risk of expanding AAA, MI, CVA, PAD, carcinoma. Discussion and question answer period 5-7 minutes.    3. Surgical aftercare, circulatory system  Clean operative site with antibacterial soap/water, pat dry. Keep open to air unless draining, then may apply dry dressing.  No ointments or creams unless prescribed by provider.              This document has been electronically signed by SCOUT Fuentes on March 18, 2021 12:46 CDT

## 2021-03-19 ENCOUNTER — IMMUNIZATION (OUTPATIENT)
Dept: VACCINE CLINIC | Facility: HOSPITAL | Age: 71
End: 2021-03-19

## 2021-03-19 PROCEDURE — 91300 HC SARSCOV02 VAC 30MCG/0.3ML IM: CPT | Performed by: THORACIC SURGERY (CARDIOTHORACIC VASCULAR SURGERY)

## 2021-03-19 PROCEDURE — 0002A: CPT | Performed by: THORACIC SURGERY (CARDIOTHORACIC VASCULAR SURGERY)

## 2021-03-21 LAB
QT INTERVAL: 418 MS
QTC INTERVAL: 444 MS

## 2021-03-22 ENCOUNTER — READMISSION MANAGEMENT (OUTPATIENT)
Dept: CALL CENTER | Facility: HOSPITAL | Age: 71
End: 2021-03-22

## 2021-03-26 ENCOUNTER — READMISSION MANAGEMENT (OUTPATIENT)
Dept: CALL CENTER | Facility: HOSPITAL | Age: 71
End: 2021-03-26

## 2021-03-26 NOTE — OUTREACH NOTE
Medical Week 3 Survey      Responses   [facility] patient discharged from?  [place]   Does the patient have one of the following disease processes/diagnoses(primary or secondary)?  Other   Week 3 attempt successful?  Yes   Call start time  1533   Call end time  1535   Meds reviewed with patient/caregiver?  Yes   Is the patient taking all medications as directed (includes completed medication regime)?  Yes   Has the patient kept scheduled appointments due by today?  Yes   What is the patient's perception of their health status since discharge?  Improving   Week 3 Call Completed?  Yes   Graduated  Yes   Is the patient interested in additional calls from an ambulatory ?  NOTE:  applies to high risk patients requiring additional follow-up.  No   Did the patient feel the follow up calls were helpful during their recovery period?  Yes   Was the number of calls appropriate?  Yes   Would the patient like more information on Advance Care Planning?  No   Graduated/Revoked comments  He states is doing fine, no questions, healing well, no new issues          Clau Ray, RN

## 2021-04-07 ENCOUNTER — OFFICE VISIT (OUTPATIENT)
Dept: CARDIAC SURGERY | Facility: CLINIC | Age: 71
End: 2021-04-07

## 2021-04-07 VITALS
HEART RATE: 68 BPM | HEIGHT: 72 IN | WEIGHT: 146.4 LBS | TEMPERATURE: 97.8 F | BODY MASS INDEX: 19.83 KG/M2 | OXYGEN SATURATION: 99 % | DIASTOLIC BLOOD PRESSURE: 62 MMHG | SYSTOLIC BLOOD PRESSURE: 104 MMHG

## 2021-04-07 DIAGNOSIS — I65.23 BILATERAL CAROTID ARTERY STENOSIS: ICD-10-CM

## 2021-04-07 DIAGNOSIS — E78.2 MIXED HYPERLIPIDEMIA: ICD-10-CM

## 2021-04-07 DIAGNOSIS — Z72.0 TOBACCO USE: Primary | ICD-10-CM

## 2021-04-07 PROCEDURE — 99024 POSTOP FOLLOW-UP VISIT: CPT | Performed by: NURSE PRACTITIONER

## 2021-04-07 NOTE — PATIENT INSTRUCTIONS
The results of your carotid ultrasound shows mild disease of the right carotid and is improving from previous exam.    Follow up in 3 months repeat carotid ultrasound.      If you should experience any neurological symptoms including but not limited to visual or speech disturbances confusion, seizures, or weakness of limbs of one side of your body notify Heart and Vascular center immediately for evaluation or if after hours present to the nearest Emergency Department.

## 2021-04-08 NOTE — PROGRESS NOTES
CVTS Office Progress Note     Dwight Ryder  1950    Chief Complaint:    Chief Complaint   Patient presents with   • Carotid Artery Disease     f/u 3/5/21 right TCAR       HPI:      PCP:  Stephanie Mai MD    70 y.o. male with HTN(stable, increased risk stroke, rupture), Hyperlipidemia(stable, increased risk cardiovascular events) and Smoker(uncontrolled, increased risk cardiovascular events) BPH, PVD (stable, increased risk cardiovascular events).  smokes 1 PPD.  Established with CTVS in 2013 with lifestyle limiting claudication, prior R SFA stent occluded.  Underwent bypass which occluded the following year requiring lysis.  Unfortunately he continues to smoke.  No complaints of claudication at today's visit.  No other associated signs, symptoms or modifying factors.  Underwent R TCAR, no new deficits, did well post operatively.     2012 RIGHT SFA stent  2013 RIGHT femoral to popliteal artery bypass (PTFE)  12/15/2014 RIGHT lower extremity angiogram:  RIGHT fem-pop thrombolysis, EKOS.  12/2020 Restudy: Graft patent  11/22/2016 RANGEL:  RIGHT 1.1 triphasic.  LEFT 1.1 triphasic.  Graft 169cm/s, triphasic.  5/22/2017 RANGEL:  RIGHT 1.1 triphasic.  LEFT 1.0 triphasic.  Graft 186cm/s, triphasic.  12/06/17   RANGEL:  RIGHT 1.12  Triphasic.  LEFT 0.99  Triphasic.  Graft Patent 237 cm/s, triphasic.  06/07/18  RANGEL:  RIGHT 0.99  Triphasic.  LEFT 0.98  Triphasic.   12/18/18  RANGEL: Right 1.04 Fem triphasic Poptriphasic DP triphasic PT triphasic, Left 0.95 Fem triphasic Poptriphasic DP triphasic PT triphasic  12/18/18  U/S Graft Survey: RT PTFE bypass patent mPSV 236 at proximal anastomosis   7/16/19 RANGEL: Right 1.07 triphasic.  Left 0.99 triphasic.  7/2020 RANGEL: Right 1.0 triphasic.  Left 1.05 triphasic.   7/2020 Right Graft US: Patent, [id number] proximal anastamosis  1/2021 RANGEL: Right 0.9 triphasic.  Left 0.94 triphasic.     7/2020 Carotid Duplex: GELACIO 50-69% mPSV 133c/s Ratio 1.3, LICA 50-69% mPSV 139c/s Ratio 0.8,  Antegrade vertebrals  1/2021 Carotid Duplex: GELACIO >70% mPSV 235c/s Ratio 1.9, LICA 50-69% mPSV 154c/s Ratio 1.2, Antegrade vertebrals  1/27/2021:CTA Carotids: GELACIO 80% RSCA 60% LICA 40%  3/5/2021 R TCAR  4/2021 Carotid Duplex: GELACIO 50-69% mPSV 139c/s Ratio 2.1, Antegrade vertebrals    The following portions of the patient's history were reviewed and updated as appropriate: allergies, current medications, past family history, past medical history, past social history, past surgical history and problem list.  Recent images independently reviewed.  Available laboratory values reviewed.    PMH:  Past Medical History:   Diagnosis Date   • Artificial lens present     right   • Astigmatism     myopic   • Atherosclerosis of native arteries of extremity with intermittent claudication (CMS/HCC)    • Benign essential hypertension    • Benign prostatic hyperplasia    • Cataract     R>L   • Corneal foreign body     Left eye, 2 years ago      • Essential hypertension    • Examination     individual health   • Exanthematous disorder    • Fracture of foot    • Hypercholesterolemia    • Hyperkalemia    • Hyperlipidemia    • Hypertensive disorder    • Male erectile disorder    • Need for vaccination    • Peripheral vascular disease (CMS/HCC)     Post RIGHT fem-pop bypass graft 1/7/13 Post thrombolysis to graft 12/15/14      • Peripheral vascular disease (CMS/HCC)     unspecified   • Posterior subcapsular polar senile cataract    • Surgical follow-up care     R fem->pop bypass 1/7/13      • Tobacco dependence syndrome    • Tobacco user    • Urticaria     will give kenalog and benadyl , will order allery test      • Visual discomfort      Past Surgical History:   Procedure Laterality Date   • ANGIOPLASTY      femoral-popliteal artery (dilation) (Abdominal aortogram, bilateral lower extremity runoff. Repair of left common femoral artery.)   11/26/2012    • EYE SURGERY     • FEMORAL POPLITEAL BYPASS      Right with 6 mm [place]-baron graft)    01/07/2013    • INJECTION OF MEDICATION  03/04/2013    kenalog(3)    • OTHER SURGICAL HISTORY      Remove cataract, insert lens (Cataract extraction with intraocular lens implantation, right eye.)   11/26/2013    • VASCULAR SURGERY       Family History   Problem Relation Age of Onset   • Cancer Other    • Heart disease Other    • Hypertension Other    • Stroke Other      Social History     Tobacco Use   • Smoking status: Current Every Day Smoker     Packs/day: 1.50     Years: 30.00     Pack years: 45.00     Types: Cigarettes   • Smokeless tobacco: Never Used   Vaping Use   • Vaping Use: Never used   Substance Use Topics   • Alcohol use: Yes     Alcohol/week: 6.0 standard drinks     Types: 6 Cans of beer per week     Comment: daily   • Drug use: No       ALLERGIES:  Allergies   Allergen Reactions   • Lisinopril Angioedema         MEDICATIONS:    Current Outpatient Medications:   •  acetaminophen (TYLENOL) 325 MG tablet, Take 2 tablets by mouth Every 4 (Four) Hours As Needed for Mild Pain ., Disp: 60 tablet, Rfl: 0  •  aspirin 81 MG EC tablet, Take 1 tablet by mouth Daily., Disp: 30 tablet, Rfl: 1  •  atorvastatin (LIPITOR) 80 MG tablet, TAKE 1 TABLET BY MOUTH DAILY, Disp: 90 tablet, Rfl: 0  •  clopidogrel (PLAVIX) 75 MG tablet, TAKE 1 TABLET BY MOUTH DAILY, Disp: 90 tablet, Rfl: 0  •  dilTIAZem CD (CARDIZEM CD) 300 MG 24 hr capsule, TAKE 1 CAPSULE BY MOUTH DAILY, Disp: 90 capsule, Rfl: 0  •  hydrALAZINE (APRESOLINE) 50 MG tablet, TAKE 1 TABLET BY MOUTH TWICE DAILY (Patient taking differently: Take 50 mg by mouth 2 (two) times a day.), Disp: 180 tablet, Rfl: 2  •  ferrous sulfate 324 MG tablet delayed-release, Take 1 tablet by mouth Daily With Breakfast., Disp: 30 tablet, Rfl: 5  •  ondansetron (ZOFRAN) 4 MG tablet, Take 1 tablet by mouth Every 6 (Six) Hours As Needed for Nausea or Vomiting., Disp: 30 tablet, Rfl: 0  •  sennosides-docusate (senna-docusate sodium) 8.6-50 MG per tablet, Take 2 tablets by mouth 2 (Two)  "Times a Day As Needed for Constipation., Disp: , Rfl:       Review of Systems   Constitutional: Negative for chills, decreased appetite, fever and weight loss.   HENT: Negative for congestion, nosebleeds and sore throat.    Eyes: Negative for blurred vision, visual disturbance and visual halos.   Cardiovascular: Negative for chest pain, dyspnea on exertion and leg swelling.   Respiratory: Negative for cough, shortness of breath, sputum production and wheezing.    Endocrine: Negative for cold intolerance and polyuria.   Hematologic/Lymphatic: Negative for bleeding problem. Bruises/bleeds easily.   Skin: Positive for unusual hair distribution. Negative for flushing and nail changes.   Musculoskeletal: Positive for arthritis, back pain and joint pain.   Gastrointestinal: Negative for bloating, abdominal pain, hematemesis, melena, nausea and vomiting.   Genitourinary: Negative for flank pain and hematuria.   Neurological: Negative for brief paralysis, difficulty with concentration, focal weakness, light-headedness, loss of balance, numbness, paresthesias and weakness.   Psychiatric/Behavioral: Negative for altered mental status, depression, substance abuse and suicidal ideas.   Allergic/Immunologic: Negative for hives and persistent infections.         Vitals:    04/07/21 1314   BP: 104/62   BP Location: Left arm   Pulse: 68   Temp: 97.8 °F (36.6 °C)   TempSrc: Infrared   SpO2: 99%   Weight: 66.4 kg (146 lb 6.4 oz)   Height: 182.9 cm (72\")     Physical Exam   Constitutional: He is oriented to person, place, and time. He appears well-developed.   HENT:   Head: Normocephalic and atraumatic.   Eyes: Pupils are equal, round, and reactive to light. Conjunctivae are normal.   Cardiovascular: Normal rate.   No murmur heard.  Pulmonary/Chest: Effort normal and breath sounds normal.   Abdominal: Soft. Bowel sounds are normal.   Musculoskeletal: Normal range of motion. No tenderness.   Neurological: He is alert and oriented to " person, place, and time. No cranial nerve deficit.   Skin: Skin is warm and dry. Capillary refill takes less than 2 seconds.   There is no evidence of skin breakdown of the bilateral lower extremities.  BLE pink, no evidence of ischemia.  Feet are absent of dependent rubor.   Psychiatric: Judgment normal.   Nursing note and vitals reviewed.      Assessment & Plan     Independent Review of Studies  4/2021 Carotid Duplex: GELACIO 50-69% mPSV 139c/s Ratio 2.1, Antegrade vertebrals    1. Carotid stenosis, asymptomatic, bilateral  No new deficits, s/p R TCAR  Duplex with stable velocities post procedure  ASA, Plavix, Statin    No reperfusion symptoms    Repeat duplex bilateral in 3 months    2. Tobacco use  Smoking cessation assistance options offered including behavioral counseling (Smoking Cessation Classes), Nicotine replacement therapy (patches or gum), pharmacologic therapy (Chantix, Wellbutrin). Understands tobacco increases risk of expanding AAA, MI, CVA, PAD, carcinoma. Discussion and question answer period 5-7 minutes.    3. Surgical aftercare, circulatory system  Clean operative site with antibacterial soap/water, pat dry. Keep open to air unless draining, then may apply dry dressing.  No ointments or creams unless prescribed by provider.              This document has been electronically signed by SCOUT Fuentes on April 8, 2021 15:32 CDT

## 2021-05-03 ENCOUNTER — TRANSCRIBE ORDERS (OUTPATIENT)
Dept: LAB | Facility: HOSPITAL | Age: 71
End: 2021-05-03

## 2021-05-03 ENCOUNTER — LAB (OUTPATIENT)
Dept: LAB | Facility: HOSPITAL | Age: 71
End: 2021-05-03

## 2021-05-03 DIAGNOSIS — N18.9 ANEMIA IN CHRONIC KIDNEY DISEASE, UNSPECIFIED CKD STAGE: ICD-10-CM

## 2021-05-03 DIAGNOSIS — E55.9 VITAMIN D DEFICIENCY: ICD-10-CM

## 2021-05-03 DIAGNOSIS — Z72.0 TOBACCO USE: ICD-10-CM

## 2021-05-03 DIAGNOSIS — D63.1 ANEMIA IN CHRONIC KIDNEY DISEASE, UNSPECIFIED CKD STAGE: ICD-10-CM

## 2021-05-03 DIAGNOSIS — I10 ESSENTIAL HYPERTENSION: Primary | ICD-10-CM

## 2021-05-03 DIAGNOSIS — I10 ESSENTIAL HYPERTENSION: ICD-10-CM

## 2021-05-03 LAB
25(OH)D3 SERPL-MCNC: 45.3 NG/ML
ALBUMIN SERPL-MCNC: 4.2 G/DL (ref 3.5–5.2)
ANION GAP SERPL CALCULATED.3IONS-SCNC: 10.2 MMOL/L (ref 5–15)
ANISOCYTOSIS BLD QL: NORMAL
BUN SERPL-MCNC: 15 MG/DL (ref 8–23)
BUN/CREAT SERPL: 9.8 (ref 7–25)
CALCIUM SPEC-SCNC: 9.2 MG/DL (ref 8.6–10.5)
CHLORIDE SERPL-SCNC: 101 MMOL/L (ref 98–107)
CO2 SERPL-SCNC: 19.8 MMOL/L (ref 22–29)
CREAT SERPL-MCNC: 1.53 MG/DL (ref 0.76–1.27)
DEPRECATED RDW RBC AUTO: 49.8 FL (ref 37–54)
EOSINOPHIL # BLD MANUAL: 0.16 10*3/MM3 (ref 0–0.4)
EOSINOPHIL NFR BLD MANUAL: 4.2 % (ref 0.3–6.2)
ERYTHROCYTE [DISTWIDTH] IN BLOOD BY AUTOMATED COUNT: 19.8 % (ref 12.3–15.4)
GFR SERPL CREATININE-BSD FRML MDRD: 55 ML/MIN/1.73
GLUCOSE SERPL-MCNC: 97 MG/DL (ref 65–99)
HCT VFR BLD AUTO: 27.7 % (ref 37.5–51)
HGB BLD-MCNC: 8.7 G/DL (ref 13–17.7)
IRON 24H UR-MRATE: 21 MCG/DL (ref 59–158)
IRON SATN MFR SERPL: 5 % (ref 20–50)
LYMPHOCYTES # BLD MANUAL: 1.22 10*3/MM3 (ref 0.7–3.1)
LYMPHOCYTES NFR BLD MANUAL: 10.5 % (ref 5–12)
LYMPHOCYTES NFR BLD MANUAL: 31.6 % (ref 19.6–45.3)
MCH RBC QN AUTO: 23.1 PG (ref 26.6–33)
MCHC RBC AUTO-ENTMCNC: 31.4 G/DL (ref 31.5–35.7)
MCV RBC AUTO: 73.5 FL (ref 79–97)
MICROCYTES BLD QL: NORMAL
MONOCYTES # BLD AUTO: 0.4 10*3/MM3 (ref 0.1–0.9)
NEUTROPHILS # BLD AUTO: 2.07 10*3/MM3 (ref 1.7–7)
NEUTROPHILS NFR BLD MANUAL: 53.7 % (ref 42.7–76)
PHOSPHATE SERPL-MCNC: 3.9 MG/DL (ref 2.5–4.5)
PLAT MORPH BLD: NORMAL
PLATELET # BLD AUTO: 328 10*3/MM3 (ref 140–450)
POIKILOCYTOSIS BLD QL SMEAR: NORMAL
POLYCHROMASIA BLD QL SMEAR: NORMAL
POTASSIUM SERPL-SCNC: 4.4 MMOL/L (ref 3.5–5.2)
RBC # BLD AUTO: 3.77 10*6/MM3 (ref 4.14–5.8)
SODIUM SERPL-SCNC: 131 MMOL/L (ref 136–145)
TIBC SERPL-MCNC: 447 MCG/DL (ref 298–536)
TRANSFERRIN SERPL-MCNC: 300 MG/DL (ref 200–360)
WBC # BLD AUTO: 3.85 10*3/MM3 (ref 3.4–10.8)
WBC MORPH BLD: NORMAL

## 2021-05-03 PROCEDURE — 83540 ASSAY OF IRON: CPT

## 2021-05-03 PROCEDURE — 85027 COMPLETE CBC AUTOMATED: CPT

## 2021-05-03 PROCEDURE — 36415 COLL VENOUS BLD VENIPUNCTURE: CPT

## 2021-05-03 PROCEDURE — 80069 RENAL FUNCTION PANEL: CPT

## 2021-05-03 PROCEDURE — 84466 ASSAY OF TRANSFERRIN: CPT

## 2021-05-03 PROCEDURE — 85007 BL SMEAR W/DIFF WBC COUNT: CPT

## 2021-05-03 PROCEDURE — 82306 VITAMIN D 25 HYDROXY: CPT

## 2021-06-16 ENCOUNTER — OFFICE VISIT (OUTPATIENT)
Dept: FAMILY MEDICINE CLINIC | Facility: CLINIC | Age: 71
End: 2021-06-16

## 2021-06-16 VITALS
HEART RATE: 88 BPM | OXYGEN SATURATION: 99 % | TEMPERATURE: 96.4 F | DIASTOLIC BLOOD PRESSURE: 52 MMHG | WEIGHT: 143.1 LBS | SYSTOLIC BLOOD PRESSURE: 120 MMHG | BODY MASS INDEX: 19.38 KG/M2 | HEIGHT: 72 IN

## 2021-06-16 DIAGNOSIS — Z72.0 TOBACCO USE: ICD-10-CM

## 2021-06-16 DIAGNOSIS — I65.23 BILATERAL CAROTID ARTERY STENOSIS: ICD-10-CM

## 2021-06-16 DIAGNOSIS — I10 ESSENTIAL HYPERTENSION: Primary | ICD-10-CM

## 2021-06-16 PROCEDURE — 99213 OFFICE O/P EST LOW 20 MIN: CPT | Performed by: STUDENT IN AN ORGANIZED HEALTH CARE EDUCATION/TRAINING PROGRAM

## 2021-06-16 RX ORDER — HYDRALAZINE HYDROCHLORIDE 25 MG/1
25 TABLET, FILM COATED ORAL 2 TIMES DAILY
Qty: 60 TABLET | Refills: 1 | Status: SHIPPED | OUTPATIENT
Start: 2021-06-16 | End: 2022-03-23

## 2021-06-16 RX ORDER — LOSARTAN POTASSIUM 25 MG/1
25 TABLET ORAL DAILY
COMMUNITY
End: 2022-05-25 | Stop reason: SDUPTHER

## 2021-06-16 NOTE — PROGRESS NOTES
[facility]  Stephanie aMi MD    Subjective:     Dwight Ryder is a 70 y.o. male who presents for follow-up of hypertension.  Current medications include Cardizem 300 mg, hydralazine 50 mg twice daily, and losartan 25 mg.  Home blood pressures have been 110s/60s.  Patient reports occasional episodes of lightheadedness when taking his blood pressure medications together.  Denies chest pain, palpitations, shortness of air.    Patient has bilateral carotid stenosis status post right carotid revascularization on 3/5.  He was seen by vascular surgery on 4/7 with stable postop recovery.  Plan for follow-up with repeat bilateral ultrasound next month.    The following portions of the patient's history were reviewed and updated as appropriate: allergies, current medications, past family history, past medical history, past social history, past surgical history and problem list.    Past Medical Hx:  Past Medical History:   Diagnosis Date   • Artificial lens present     right   • Astigmatism     myopic   • Atherosclerosis of native arteries of extremity with intermittent claudication (CMS/HCC)    • Benign essential hypertension    • Benign prostatic hyperplasia    • Cataract     R>L   • Corneal foreign body     Left eye, 2 years ago      • Essential hypertension    • Examination     individual health   • Exanthematous disorder    • Fracture of foot    • Hypercholesterolemia    • Hyperkalemia    • Hyperlipidemia    • Hypertensive disorder    • Male erectile disorder    • Need for vaccination    • Peripheral vascular disease (CMS/HCC)     Post RIGHT fem-pop bypass graft 1/7/13 Post thrombolysis to graft 12/15/14      • Peripheral vascular disease (CMS/HCC)     unspecified   • Posterior subcapsular polar senile cataract    • Surgical follow-up care     R fem->pop bypass 1/7/13      • Tobacco dependence syndrome    • Tobacco user    • Urticaria     will give kenalog and benadyl , will order allery test      •  Visual discomfort        Past Surgical Hx:  Past Surgical History:   Procedure Laterality Date   • ANGIOPLASTY      femoral-popliteal artery (dilation) (Abdominal aortogram, bilateral lower extremity runoff. Repair of left common femoral artery.)   11/26/2012    • EYE SURGERY     • FEMORAL POPLITEAL BYPASS      Right with 6 mm [place]-baron graft)   01/07/2013    • INJECTION OF MEDICATION  03/04/2013    kenalog(3)    • OTHER SURGICAL HISTORY      Remove cataract, insert lens (Cataract extraction with intraocular lens implantation, right eye.)   11/26/2013    • VASCULAR SURGERY         Current Meds:    Current Outpatient Medications:   •  acetaminophen (TYLENOL) 325 MG tablet, Take 2 tablets by mouth Every 4 (Four) Hours As Needed for Mild Pain ., Disp: 60 tablet, Rfl: 0  •  aspirin 81 MG EC tablet, Take 1 tablet by mouth Daily., Disp: 30 tablet, Rfl: 1  •  atorvastatin (LIPITOR) 80 MG tablet, TAKE 1 TABLET BY MOUTH DAILY, Disp: 90 tablet, Rfl: 0  •  clopidogrel (PLAVIX) 75 MG tablet, TAKE 1 TABLET BY MOUTH DAILY, Disp: 90 tablet, Rfl: 0  •  dilTIAZem CD (CARDIZEM CD) 300 MG 24 hr capsule, TAKE 1 CAPSULE BY MOUTH DAILY, Disp: 90 capsule, Rfl: 0  •  ferrous sulfate 324 MG tablet delayed-release, Take 1 tablet by mouth Daily With Breakfast., Disp: 30 tablet, Rfl: 5  •  losartan (COZAAR) 25 MG tablet, Take 25 mg by mouth Daily., Disp: , Rfl:   •  ondansetron (ZOFRAN) 4 MG tablet, Take 1 tablet by mouth Every 6 (Six) Hours As Needed for Nausea or Vomiting., Disp: 30 tablet, Rfl: 0  •  sennosides-docusate (senna-docusate sodium) 8.6-50 MG per tablet, Take 2 tablets by mouth 2 (Two) Times a Day As Needed for Constipation., Disp: , Rfl:   •  hydrALAZINE (APRESOLINE) 25 MG tablet, Take 1 tablet by mouth 2 (Two) Times a Day., Disp: 60 tablet, Rfl: 1    Allergies:  Allergies   Allergen Reactions   • Lisinopril Angioedema       Family Hx:  Family History   Problem Relation Age of Onset   • Cancer Other    • Heart disease Other    •  "Hypertension Other    • Stroke Other         Social History:  Social History     Socioeconomic History   • Marital status: Single     Spouse name: Not on file   • Number of children: Not on file   • Years of education: Not on file   • Highest education level: Not on file   Tobacco Use   • Smoking status: Current Every Day Smoker     Packs/day: 1.50     Years: 30.00     Pack years: 45.00     Types: Cigarettes   • Smokeless tobacco: Never Used   Vaping Use   • Vaping Use: Never used   Substance and Sexual Activity   • Alcohol use: Yes     Alcohol/week: 6.0 standard drinks     Types: 6 Cans of beer per week     Comment: daily   • Drug use: No   • Sexual activity: Defer       Review of Systems  Review of Systems   Constitutional: Negative for activity change, appetite change, chills, fatigue and fever.   HENT: Negative for congestion, rhinorrhea, sinus pain and sore throat.    Eyes: Negative for pain, redness and visual disturbance.   Respiratory: Negative for cough, shortness of breath and wheezing.    Cardiovascular: Negative for chest pain, palpitations and leg swelling.   Gastrointestinal: Negative for abdominal pain, constipation, diarrhea, nausea and vomiting.   Genitourinary: Negative for dysuria and flank pain.   Musculoskeletal: Negative for arthralgias, joint swelling and myalgias.   Skin: Negative for color change, pallor and rash.   Neurological: Positive for light-headedness. Negative for dizziness, numbness and headaches.   Psychiatric/Behavioral: Negative for dysphoric mood and sleep disturbance. The patient is not nervous/anxious.        Objective:     /52   Pulse 88   Temp 96.4 °F (35.8 °C)   Ht 182.9 cm (72\")   Wt 64.9 kg (143 lb 1.6 oz)   SpO2 99%   BMI 19.41 kg/m²   Physical Exam  Constitutional:       General: He is not in acute distress.     Appearance: Normal appearance. He is well-developed. He is not diaphoretic.   HENT:      Head: Normocephalic and atraumatic.      Right Ear: " Hearing normal.      Left Ear: Hearing normal.   Eyes:      General: Lids are normal.      Conjunctiva/sclera: Conjunctivae normal.   Cardiovascular:      Rate and Rhythm: Normal rate and regular rhythm.      Heart sounds: Normal heart sounds, S1 normal and S2 normal. No murmur heard.     Pulmonary:      Effort: Pulmonary effort is normal. No respiratory distress.      Breath sounds: Normal breath sounds. No wheezing or rales.   Abdominal:      General: Bowel sounds are normal. There is no distension.      Palpations: Abdomen is soft.      Tenderness: There is no abdominal tenderness. There is no guarding.   Musculoskeletal:         General: No tenderness or deformity. Normal range of motion.   Skin:     General: Skin is warm and dry.      Coloration: Skin is not pale.      Findings: No erythema or rash.   Neurological:      Mental Status: He is alert and oriented to person, place, and time. He is not disoriented.   Psychiatric:         Speech: Speech normal.         Behavior: Behavior normal.          Assessment/Plan:     Diagnoses and all orders for this visit:    1. Essential hypertension (Primary)  -     hydrALAZINE (APRESOLINE) 25 MG tablet; Take 1 tablet by mouth 2 (Two) Times a Day.  Dispense: 60 tablet; Refill: 1    2. Bilateral carotid artery stenosis    3. Tobacco use    Lightheadedness/dizziness episodes are likely due to hypertension.  He has spread out when he takes his blood pressure medication to counteract this.  Will decrease hydralazine to 25 mg twice daily, from 50 mg twice daily.  Continue Cardizem 300 mg and losartan 25 mg.    Follow-up with vascular surgery for carotid stenosis next month.  Patient continues to smoke and is not interested in cessation options currently.    · Rx changes: decrease hydralazine to 25mg BID, from 50mg  · Patient Education: see a/p  · Compliance at present is estimated to be good.        Follow-up:     Return in about 6 months (around 12/16/2021) for  Beata.    Preventative:  Health Maintenance   Topic Date Due   • LUNG CANCER SCREENING  08/13/2021 (Originally 10/23/2002)   • LIPID PANEL  08/26/2021 (Originally 1/9/2019)   • ZOSTER VACCINE (2 of 2) 11/23/2021 (Originally 11/9/2016)   • Pneumococcal Vaccine 65+ (1 of 1 - PPSV23) 03/01/2022 (Originally 10/18/2018)   • INFLUENZA VACCINE  08/01/2021   • ANNUAL WELLNESS VISIT  08/13/2021   • TDAP/TD VACCINES (2 - Td or Tdap) 09/14/2026   • COLORECTAL CANCER SCREENING  09/14/2026   • HEPATITIS C SCREENING  Completed   • COVID-19 Vaccine  Completed   • AAA SCREEN (ONE-TIME)  Completed     Male Preventative: Colon cancer screening is up to date  Recommended: Varicella and Pneumovax  Vaccine Counseling: N/A    Weight  -Class: Normal: 18.5-24.9kg/m2  -Patient's Body mass index is 19.41 kg/m². indicating that he is within normal range (BMI 18.5-24.9). No BMI management plan needed..   eat more fruits and vegetables, decrease soda or juice intake, increase water intake, increase physical activity, reduce screen time and reduce portion size    Alcohol use:  reports current alcohol use of about 6.0 standard drinks of alcohol per week.  Nicotine status  reports that he has been smoking cigarettes. He has a 45.00 pack-year smoking history. He has never used smokeless tobacco.    Goals     •  cut back on smoking to 1/2 ppd (pt-stated)       Barrier:  Dependence            RISK SCORE: 3      Stephanie Mai M.D. PGY2  [facility]  [address] [place] [address]  Office: [phone]  This document has been electronically signed by Stephanie Mai MD on June 16, 2021 14:04 CDT

## 2021-06-17 NOTE — PROGRESS NOTES
I have reviewed the notes, assessments, and/or procedures performed by Stephanie Mai MD, I concur with her/his documentation and assessment and plan for Dwight Ryder.                This document has been electronically signed by Liban Mandujano MD on June 17, 2021 09:45 CDT

## 2021-06-30 DIAGNOSIS — I73.9 PERIPHERAL VASCULAR DISEASE (HCC): ICD-10-CM

## 2021-06-30 RX ORDER — CLOPIDOGREL BISULFATE 75 MG/1
75 TABLET ORAL DAILY
Qty: 90 TABLET | Refills: 0 | Status: SHIPPED | OUTPATIENT
Start: 2021-06-30 | End: 2021-11-30

## 2021-06-30 RX ORDER — ATORVASTATIN CALCIUM 80 MG/1
80 TABLET, FILM COATED ORAL DAILY
Qty: 90 TABLET | Refills: 0 | Status: SHIPPED | OUTPATIENT
Start: 2021-06-30 | End: 2021-11-30

## 2021-07-21 ENCOUNTER — OFFICE VISIT (OUTPATIENT)
Dept: CARDIAC SURGERY | Facility: CLINIC | Age: 71
End: 2021-07-21

## 2021-07-21 VITALS
TEMPERATURE: 98.2 F | WEIGHT: 147.2 LBS | BODY MASS INDEX: 19.94 KG/M2 | OXYGEN SATURATION: 99 % | DIASTOLIC BLOOD PRESSURE: 62 MMHG | HEART RATE: 68 BPM | HEIGHT: 72 IN | SYSTOLIC BLOOD PRESSURE: 110 MMHG

## 2021-07-21 DIAGNOSIS — E78.2 MIXED HYPERLIPIDEMIA: ICD-10-CM

## 2021-07-21 DIAGNOSIS — I65.23 BILATERAL CAROTID ARTERY STENOSIS: ICD-10-CM

## 2021-07-21 DIAGNOSIS — Z72.0 TOBACCO USE: Primary | ICD-10-CM

## 2021-07-21 PROCEDURE — 99213 OFFICE O/P EST LOW 20 MIN: CPT | Performed by: NURSE PRACTITIONER

## 2021-07-21 NOTE — PATIENT INSTRUCTIONS
The results of your carotid ultrasound shows mild disease of the right carotid and is stable from previous exam, ultrasound of the left carotid shows mild disease and is stable from previous exam.    Follow up in 6 months    If you should experience any neurological symptoms including but not limited to visual or speech disturbances confusion, seizures, or weakness of limbs of one side of your body notify [facility] and Vascular center immediately for evaluation or if after hours present to the nearest Emergency Department.

## 2021-07-23 NOTE — PROGRESS NOTES
CVTS Office Progress Note     Dwight Ryder  1950    Chief Complaint:    Chief Complaint   Patient presents with   • Carotid Artery Disease     3 mo f/u       HPI:      PCP:  Stephanie Mai MD    70 y.o. male with HTN(stable, increased risk stroke, rupture), Hyperlipidemia(stable, increased risk cardiovascular events) and Smoker(uncontrolled, increased risk cardiovascular events) BPH, PVD (stable, increased risk cardiovascular events).  smokes 1 PPD.  Established with [facility] in 2013 with lifestyle limiting claudication, prior R SFA stent occluded.  Underwent bypass which occluded the following year requiring lysis.  Unfortunately he continues to smoke.  No complaints of claudication at today's visit.  No other associated signs, symptoms or modifying factors.  Underwent R TCAR, no new deficits, did well post operatively.     2012 RIGHT SFA stent  2013 RIGHT femoral to popliteal artery bypass (PTFE)  12/15/2014 RIGHT lower extremity angiogram:  RIGHT fem-pop thrombolysis, EKOS.  12/2020 Restudy: Graft patent  11/22/2016 RANGEL:  RIGHT 1.1 triphasic.  LEFT 1.1 triphasic.  Graft 169cm/s, triphasic.  5/22/2017 RANGEL:  RIGHT 1.1 triphasic.  LEFT 1.0 triphasic.  Graft 186cm/s, triphasic.  12/06/17   RANGEL:  RIGHT 1.12  Triphasic.  LEFT 0.99  Triphasic.  Graft Patent 237 cm/s, triphasic.  06/07/18  RANGEL:  RIGHT 0.99  Triphasic.  LEFT 0.98  Triphasic.   12/18/18  RANGEL: Right 1.04 Fem triphasic Poptriphasic DP triphasic PT triphasic, Left 0.95 Fem triphasic Poptriphasic DP triphasic PT triphasic  12/18/18  U/S Graft Survey: RT PTFE bypass patent mPSV 236 at proximal anastomosis   7/16/19 RANGEL: Right 1.07 triphasic.  Left 0.99 triphasic.  7/2020 RANGEL: Right 1.0 triphasic.  Left 1.05 triphasic.   7/2020 Right Graft US: Patent, [id number] proximal anastamosis  1/2021 RANGEL: Right 0.9 triphasic.  Left 0.94 triphasic.     7/2020 Carotid Duplex: GELACIO 50-69% mPSV 133c/s Ratio 1.3, LICA 50-69% mPSV 139c/s Ratio 0.8, Antegrade  vertebrals  1/2021 Carotid Duplex: GELACIO >70% mPSV 235c/s Ratio 1.9, LICA 50-69% mPSV 154c/s Ratio 1.2, Antegrade vertebrals  1/27/2021:CTA Carotids: GELACIO 80% RSCA 60% LICA 40%  3/5/2021 R TCAR  4/2021 Carotid Duplex: GELACIO 50-69% mPSV 139c/s Ratio 2.1, Antegrade vertebrals  7/2021 Carotid Duplex: GELACIO 0-49% mPSV 119c/s Ratio 1.3, LICA 0-49% mPSV 113c/s Ratio 0.6, Antegrade vertebrals    The following portions of the patient's history were reviewed and updated as appropriate: allergies, current medications, past family history, past medical history, past social history, past surgical history and problem list.  Recent images independently reviewed.  Available laboratory values reviewed.    PMH:  Past Medical History:   Diagnosis Date   • Artificial lens present     right   • Astigmatism     myopic   • Atherosclerosis of native arteries of extremity with intermittent claudication (CMS/HCC)    • Benign essential hypertension    • Benign prostatic hyperplasia    • Cataract     R>L   • Corneal foreign body     Left eye, 2 years ago      • Essential hypertension    • Examination     individual health   • Exanthematous disorder    • Fracture of foot    • Hypercholesterolemia    • Hyperkalemia    • Hyperlipidemia    • Hypertensive disorder    • Male erectile disorder    • Need for vaccination    • Peripheral vascular disease (CMS/HCC)     Post RIGHT fem-pop bypass graft 1/7/13 Post thrombolysis to graft 12/15/14      • Peripheral vascular disease (CMS/HCC)     unspecified   • Posterior subcapsular polar senile cataract    • Surgical follow-up care     R fem->pop bypass 1/7/13      • Tobacco dependence syndrome    • Tobacco user    • Urticaria     will give kenalog and benadyl , will order allery test      • Visual discomfort      Past Surgical History:   Procedure Laterality Date   • ANGIOPLASTY      femoral-popliteal artery (dilation) (Abdominal aortogram, bilateral lower extremity runoff. Repair of left common femoral  artery.)   11/26/2012    • EYE SURGERY     • FEMORAL POPLITEAL BYPASS      Right with 6 mm [place]-baron graft)   01/07/2013    • INJECTION OF MEDICATION  03/04/2013    kenalog(3)    • OTHER SURGICAL HISTORY      Remove cataract, insert lens (Cataract extraction with intraocular lens implantation, right eye.)   11/26/2013    • VASCULAR SURGERY       Family History   Problem Relation Age of Onset   • Cancer Other    • Heart disease Other    • Hypertension Other    • Stroke Other      Social History     Tobacco Use   • Smoking status: Current Every Day Smoker     Packs/day: 1.50     Years: 30.00     Pack years: 45.00     Types: Cigarettes   • Smokeless tobacco: Never Used   Vaping Use   • Vaping Use: Never used   Substance Use Topics   • Alcohol use: Yes     Alcohol/week: 6.0 standard drinks     Types: 6 Cans of beer per week     Comment: daily   • Drug use: No       ALLERGIES:  Allergies   Allergen Reactions   • Lisinopril Angioedema         MEDICATIONS:    Current Outpatient Medications:   •  acetaminophen (TYLENOL) 325 MG tablet, Take 2 tablets by mouth Every 4 (Four) Hours As Needed for Mild Pain ., Disp: 60 tablet, Rfl: 0  •  aspirin 81 MG EC tablet, Take 1 tablet by mouth Daily., Disp: 30 tablet, Rfl: 1  •  atorvastatin (LIPITOR) 80 MG tablet, Take 1 tablet by mouth Daily., Disp: 90 tablet, Rfl: 0  •  clopidogrel (PLAVIX) 75 MG tablet, Take 1 tablet by mouth Daily., Disp: 90 tablet, Rfl: 0  •  dilTIAZem CD (CARDIZEM CD) 300 MG 24 hr capsule, TAKE 1 CAPSULE BY MOUTH DAILY, Disp: 90 capsule, Rfl: 0  •  ferrous sulfate 324 MG tablet delayed-release, Take 1 tablet by mouth Daily With Breakfast., Disp: 30 tablet, Rfl: 5  •  hydrALAZINE (APRESOLINE) 25 MG tablet, Take 1 tablet by mouth 2 (Two) Times a Day., Disp: 60 tablet, Rfl: 1  •  losartan (COZAAR) 25 MG tablet, Take 25 mg by mouth Daily., Disp: , Rfl:   •  ondansetron (ZOFRAN) 4 MG tablet, Take 1 tablet by mouth Every 6 (Six) Hours As Needed for Nausea or Vomiting.,  "Disp: 30 tablet, Rfl: 0  •  sennosides-docusate (senna-docusate sodium) 8.6-50 MG per tablet, Take 2 tablets by mouth 2 (Two) Times a Day As Needed for Constipation., Disp: , Rfl:       Review of Systems   Constitutional: Negative for chills, decreased appetite, fever and weight loss.   HENT: Negative for congestion, nosebleeds and sore throat.    Eyes: Negative for blurred vision, visual disturbance and visual halos.   Cardiovascular: Negative for chest pain, dyspnea on exertion and leg swelling.   Respiratory: Negative for cough, shortness of breath, sputum production and wheezing.    Endocrine: Negative for cold intolerance and polyuria.   Hematologic/Lymphatic: Negative for bleeding problem. Bruises/bleeds easily.   Skin: Positive for unusual hair distribution. Negative for flushing and nail changes.   Musculoskeletal: Positive for arthritis, back pain and joint pain.   Gastrointestinal: Negative for bloating, abdominal pain, hematemesis, melena, nausea and vomiting.   Genitourinary: Negative for flank pain and hematuria.   Neurological: Negative for brief paralysis, difficulty with concentration, focal weakness, light-headedness, loss of balance, numbness, paresthesias and weakness.   Psychiatric/Behavioral: Negative for altered mental status, depression, substance abuse and suicidal ideas.   Allergic/Immunologic: Negative for hives and persistent infections.         Vitals:    07/21/21 1523   BP: 110/62   BP Location: Left arm   Pulse: 68   Temp: 98.2 °F (36.8 °C)   TempSrc: Infrared   SpO2: 99%   Weight: 66.8 kg (147 lb 3.2 oz)   Height: 182.9 cm (72\")     Physical Exam   Constitutional: He is oriented to person, place, and time. He appears well-developed.   HENT:   Head: Normocephalic and atraumatic.   Eyes: Pupils are equal, round, and reactive to light. Conjunctivae are normal.   Cardiovascular: Normal rate.   No murmur heard.  Pulmonary/Chest: Effort normal and breath sounds normal.   Abdominal: Soft. " Bowel sounds are normal.   Musculoskeletal: Normal range of motion. No tenderness.   Neurological: He is alert and oriented to person, place, and time. No cranial nerve deficit.   Skin: Skin is warm and dry. Capillary refill takes less than 2 seconds.   There is no evidence of skin breakdown of the bilateral lower extremities.  BLE pink, no evidence of ischemia.  Feet are absent of dependent rubor.   Psychiatric: Judgment normal.   Nursing note and vitals reviewed.      Assessment & Plan     Independent Review of Studies  7/2021 Carotid Duplex: GELACIO 0-49% mPSV 119c/s Ratio 1.3, LICA 0-49% mPSV 113c/s Ratio 0.6, Antegrade vertebrals    1. Carotid stenosis, asymptomatic, bilateral  No new deficits, s/p R TCAR  Duplex with stable velocities post procedure  ASA, Plavix, Statin    No reperfusion symptoms    Repeat duplex bilateral in 6 months    2. Tobacco use  Smoking cessation assistance options offered including behavioral counseling (Smoking Cessation Classes), Nicotine replacement therapy (patches or gum), pharmacologic therapy (Chantix, Wellbutrin). Understands tobacco increases risk of expanding AAA, MI, CVA, PAD, carcinoma. Discussion and question answer period 5-7 minutes.    3. Hyperlipidemia  Lipid-lowering therapy has been proven beneficial in patients with cardio-vascular disease. Current guidelines recommend statin treatment for all patients with PAD,CAD and carotid stenosis. Statins are beneficial in preventing cardiovascular events, increasing functional capacity and lower the risk of adverse limb loss in PAD. Statins decrease the progression of plaque formation and may improve peripheral vessel lining, and aid in reversing atherosclerosis.                  This document has been electronically signed by SCOUT Fuentes on July 23, 2021 09:05 CDT

## 2021-08-02 ENCOUNTER — LAB (OUTPATIENT)
Dept: LAB | Facility: HOSPITAL | Age: 71
End: 2021-08-02

## 2021-08-02 ENCOUNTER — TRANSCRIBE ORDERS (OUTPATIENT)
Dept: LAB | Facility: HOSPITAL | Age: 71
End: 2021-08-02

## 2021-08-02 DIAGNOSIS — I73.9 PERIPHERAL VASCULAR DISEASE, UNSPECIFIED (HCC): ICD-10-CM

## 2021-08-02 DIAGNOSIS — N18.9 ANEMIA OF CHRONIC RENAL FAILURE, UNSPECIFIED CKD STAGE: ICD-10-CM

## 2021-08-02 DIAGNOSIS — N18.31 CHRONIC KIDNEY DISEASE (CKD) STAGE G3A/A1, MODERATELY DECREASED GLOMERULAR FILTRATION RATE (GFR) BETWEEN 45-59 ML/MIN/1.73 SQUARE METER AND ALBUMINURIA CREATININE RATIO LESS THAN 30 MG/G (CMS/H* (HCC): Primary | ICD-10-CM

## 2021-08-02 DIAGNOSIS — E55.9 VITAMIN D DEFICIENCY, UNSPECIFIED: ICD-10-CM

## 2021-08-02 DIAGNOSIS — D63.1 ANEMIA OF CHRONIC RENAL FAILURE, UNSPECIFIED CKD STAGE: ICD-10-CM

## 2021-08-02 DIAGNOSIS — Z72.0 TOBACCO ABUSE: ICD-10-CM

## 2021-08-02 DIAGNOSIS — I10 HYPERTENSION, ESSENTIAL: ICD-10-CM

## 2021-08-02 PROCEDURE — 84466 ASSAY OF TRANSFERRIN: CPT | Performed by: NURSE PRACTITIONER

## 2021-08-02 PROCEDURE — 83540 ASSAY OF IRON: CPT | Performed by: NURSE PRACTITIONER

## 2021-08-02 PROCEDURE — 82570 ASSAY OF URINE CREATININE: CPT | Performed by: NURSE PRACTITIONER

## 2021-08-02 PROCEDURE — 86160 COMPLEMENT ANTIGEN: CPT | Performed by: NURSE PRACTITIONER

## 2021-08-02 PROCEDURE — 84156 ASSAY OF PROTEIN URINE: CPT | Performed by: NURSE PRACTITIONER

## 2021-08-02 PROCEDURE — 85025 COMPLETE CBC W/AUTO DIFF WBC: CPT | Performed by: NURSE PRACTITIONER

## 2021-08-02 PROCEDURE — 87340 HEPATITIS B SURFACE AG IA: CPT | Performed by: NURSE PRACTITIONER

## 2021-08-02 PROCEDURE — 80069 RENAL FUNCTION PANEL: CPT | Performed by: NURSE PRACTITIONER

## 2021-08-02 PROCEDURE — 81001 URINALYSIS AUTO W/SCOPE: CPT | Performed by: NURSE PRACTITIONER

## 2021-08-02 PROCEDURE — 36415 COLL VENOUS BLD VENIPUNCTURE: CPT | Performed by: NURSE PRACTITIONER

## 2021-08-03 LAB
ALBUMIN SERPL-MCNC: 4.2 G/DL (ref 3.5–5.2)
ANION GAP SERPL CALCULATED.3IONS-SCNC: 10.3 MMOL/L (ref 5–15)
BACTERIA UR QL AUTO: NORMAL /HPF
BASOPHILS # BLD AUTO: 0.04 10*3/MM3 (ref 0–0.2)
BASOPHILS NFR BLD AUTO: 1.4 % (ref 0–1.5)
BILIRUB UR QL STRIP: NEGATIVE
BUN SERPL-MCNC: 7 MG/DL (ref 8–23)
BUN/CREAT SERPL: 5.3 (ref 7–25)
C3 SERPL-MCNC: 94 MG/DL (ref 82–167)
C4 SERPL-MCNC: 26 MG/DL (ref 12–38)
CALCIUM SPEC-SCNC: 9 MG/DL (ref 8.6–10.5)
CHLORIDE SERPL-SCNC: 105 MMOL/L (ref 98–107)
CLARITY UR: CLEAR
CO2 SERPL-SCNC: 19.7 MMOL/L (ref 22–29)
COLOR UR: YELLOW
CREAT SERPL-MCNC: 1.32 MG/DL (ref 0.76–1.27)
CREAT UR-MCNC: 53.4 MG/DL
DEPRECATED RDW RBC AUTO: 56.5 FL (ref 37–54)
EOSINOPHIL # BLD AUTO: 0.08 10*3/MM3 (ref 0–0.4)
EOSINOPHIL NFR BLD AUTO: 2.8 % (ref 0.3–6.2)
ERYTHROCYTE [DISTWIDTH] IN BLOOD BY AUTOMATED COUNT: 20.6 % (ref 12.3–15.4)
GFR SERPL CREATININE-BSD FRML MDRD: 65 ML/MIN/1.73
GLUCOSE SERPL-MCNC: 73 MG/DL (ref 65–99)
GLUCOSE UR STRIP-MCNC: NEGATIVE MG/DL
HBV SURFACE AG SERPL QL IA: NORMAL
HCT VFR BLD AUTO: 34.7 % (ref 37.5–51)
HGB BLD-MCNC: 11.5 G/DL (ref 13–17.7)
HGB UR QL STRIP.AUTO: NEGATIVE
HYALINE CASTS UR QL AUTO: NORMAL /LPF
IRON 24H UR-MRATE: 62 MCG/DL (ref 59–158)
IRON SATN MFR SERPL: 16 % (ref 20–50)
KETONES UR QL STRIP: NEGATIVE
LEUKOCYTE ESTERASE UR QL STRIP.AUTO: NEGATIVE
LYMPHOCYTES # BLD AUTO: 0.9 10*3/MM3 (ref 0.7–3.1)
LYMPHOCYTES NFR BLD AUTO: 31.8 % (ref 19.6–45.3)
MCH RBC QN AUTO: 25.9 PG (ref 26.6–33)
MCHC RBC AUTO-ENTMCNC: 33.1 G/DL (ref 31.5–35.7)
MCV RBC AUTO: 78.2 FL (ref 79–97)
MONOCYTES # BLD AUTO: 0.45 10*3/MM3 (ref 0.1–0.9)
MONOCYTES NFR BLD AUTO: 15.9 % (ref 5–12)
NEUTROPHILS NFR BLD AUTO: 1.35 10*3/MM3 (ref 1.7–7)
NEUTROPHILS NFR BLD AUTO: 47.7 % (ref 42.7–76)
NITRITE UR QL STRIP: NEGATIVE
PH UR STRIP.AUTO: 6 [PH] (ref 5–8)
PHOSPHATE SERPL-MCNC: 3.1 MG/DL (ref 2.5–4.5)
PLATELET # BLD AUTO: 254 10*3/MM3 (ref 140–450)
POTASSIUM SERPL-SCNC: 4.3 MMOL/L (ref 3.5–5.2)
PROT UR QL STRIP: NEGATIVE
PROT UR-MCNC: 8 MG/DL
PROT/CREAT UR: 149.8 MG/G CREA (ref 0–200)
RBC # BLD AUTO: 4.44 10*6/MM3 (ref 4.14–5.8)
RBC # UR: NORMAL /HPF
REF LAB TEST METHOD: NORMAL
SODIUM SERPL-SCNC: 135 MMOL/L (ref 136–145)
SP GR UR STRIP: 1.01 (ref 1–1.03)
SQUAMOUS #/AREA URNS HPF: NORMAL /HPF
TIBC SERPL-MCNC: 386 MCG/DL (ref 298–536)
TRANSFERRIN SERPL-MCNC: 259 MG/DL (ref 200–360)
UROBILINOGEN UR QL STRIP: NORMAL
WBC # BLD AUTO: 2.83 10*3/MM3 (ref 3.4–10.8)
WBC UR QL AUTO: NORMAL /HPF

## 2021-11-26 DIAGNOSIS — I10 ESSENTIAL HYPERTENSION: ICD-10-CM

## 2021-11-26 DIAGNOSIS — I73.9 PERIPHERAL VASCULAR DISEASE (HCC): ICD-10-CM

## 2021-11-30 RX ORDER — ATORVASTATIN CALCIUM 80 MG/1
80 TABLET, FILM COATED ORAL DAILY
Qty: 90 TABLET | Refills: 3 | Status: SHIPPED | OUTPATIENT
Start: 2021-11-30 | End: 2022-11-02

## 2021-11-30 RX ORDER — HYDRALAZINE HYDROCHLORIDE 50 MG/1
TABLET, FILM COATED ORAL
Qty: 180 TABLET | Refills: 2 | OUTPATIENT
Start: 2021-11-30

## 2021-11-30 RX ORDER — CLOPIDOGREL BISULFATE 75 MG/1
75 TABLET ORAL DAILY
Qty: 90 TABLET | Refills: 3 | Status: SHIPPED | OUTPATIENT
Start: 2021-11-30 | End: 2022-12-02

## 2021-12-01 DIAGNOSIS — I10 ESSENTIAL HYPERTENSION: ICD-10-CM

## 2021-12-01 RX ORDER — DILTIAZEM HYDROCHLORIDE 300 MG/1
300 CAPSULE, COATED, EXTENDED RELEASE ORAL DAILY
Qty: 30 CAPSULE | Refills: 2 | Status: SHIPPED | OUTPATIENT
Start: 2021-12-01 | End: 2022-02-28 | Stop reason: SDUPTHER

## 2021-12-06 ENCOUNTER — TRANSCRIBE ORDERS (OUTPATIENT)
Dept: LAB | Facility: HOSPITAL | Age: 71
End: 2021-12-06

## 2021-12-06 ENCOUNTER — LAB (OUTPATIENT)
Dept: LAB | Facility: HOSPITAL | Age: 71
End: 2021-12-06

## 2021-12-06 DIAGNOSIS — N18.31 STAGE 3A CHRONIC KIDNEY DISEASE (HCC): Primary | ICD-10-CM

## 2021-12-06 DIAGNOSIS — N18.9 ANEMIA IN CHRONIC KIDNEY DISEASE, UNSPECIFIED CKD STAGE: ICD-10-CM

## 2021-12-06 DIAGNOSIS — Z72.0 TOBACCO USE: ICD-10-CM

## 2021-12-06 DIAGNOSIS — N18.31 STAGE 3A CHRONIC KIDNEY DISEASE (HCC): ICD-10-CM

## 2021-12-06 DIAGNOSIS — D63.1 ANEMIA IN CHRONIC KIDNEY DISEASE, UNSPECIFIED CKD STAGE: ICD-10-CM

## 2021-12-06 DIAGNOSIS — I10 ESSENTIAL HYPERTENSION: ICD-10-CM

## 2021-12-06 DIAGNOSIS — I73.9 PERIPHERAL VASCULAR DISEASE (HCC): ICD-10-CM

## 2021-12-06 DIAGNOSIS — E55.9 VITAMIN D DEFICIENCY: ICD-10-CM

## 2021-12-06 PROCEDURE — 82728 ASSAY OF FERRITIN: CPT

## 2021-12-06 PROCEDURE — 82306 VITAMIN D 25 HYDROXY: CPT

## 2021-12-06 PROCEDURE — 84466 ASSAY OF TRANSFERRIN: CPT

## 2021-12-06 PROCEDURE — 83540 ASSAY OF IRON: CPT

## 2021-12-06 PROCEDURE — 83970 ASSAY OF PARATHORMONE: CPT

## 2021-12-06 PROCEDURE — 36415 COLL VENOUS BLD VENIPUNCTURE: CPT

## 2021-12-06 PROCEDURE — 80069 RENAL FUNCTION PANEL: CPT

## 2021-12-06 PROCEDURE — 85025 COMPLETE CBC W/AUTO DIFF WBC: CPT

## 2021-12-07 LAB
25(OH)D3 SERPL-MCNC: 48.1 NG/ML
ALBUMIN SERPL-MCNC: 4.1 G/DL (ref 3.5–5.2)
ANION GAP SERPL CALCULATED.3IONS-SCNC: 14.1 MMOL/L (ref 5–15)
BASOPHILS # BLD AUTO: 0.04 10*3/MM3 (ref 0–0.2)
BASOPHILS NFR BLD AUTO: 1.2 % (ref 0–1.5)
BUN SERPL-MCNC: 13 MG/DL (ref 8–23)
BUN/CREAT SERPL: 9 (ref 7–25)
CALCIUM SPEC-SCNC: 9.1 MG/DL (ref 8.6–10.5)
CHLORIDE SERPL-SCNC: 100 MMOL/L (ref 98–107)
CO2 SERPL-SCNC: 19.9 MMOL/L (ref 22–29)
CREAT SERPL-MCNC: 1.44 MG/DL (ref 0.76–1.27)
DEPRECATED RDW RBC AUTO: 41.5 FL (ref 37–54)
EOSINOPHIL # BLD AUTO: 0.05 10*3/MM3 (ref 0–0.4)
EOSINOPHIL NFR BLD AUTO: 1.5 % (ref 0.3–6.2)
ERYTHROCYTE [DISTWIDTH] IN BLOOD BY AUTOMATED COUNT: 13.1 % (ref 12.3–15.4)
FERRITIN SERPL-MCNC: 94.2 NG/ML (ref 30–400)
GFR SERPL CREATININE-BSD FRML MDRD: 59 ML/MIN/1.73
GLUCOSE SERPL-MCNC: 75 MG/DL (ref 65–99)
HCT VFR BLD AUTO: 35.3 % (ref 37.5–51)
HGB BLD-MCNC: 12.4 G/DL (ref 13–17.7)
IMM GRANULOCYTES # BLD AUTO: 0.01 10*3/MM3 (ref 0–0.05)
IMM GRANULOCYTES NFR BLD AUTO: 0.3 % (ref 0–0.5)
IRON 24H UR-MRATE: 117 MCG/DL (ref 59–158)
IRON SATN MFR SERPL: 37 % (ref 20–50)
LYMPHOCYTES # BLD AUTO: 1 10*3/MM3 (ref 0.7–3.1)
LYMPHOCYTES NFR BLD AUTO: 29.5 % (ref 19.6–45.3)
MCH RBC QN AUTO: 30.6 PG (ref 26.6–33)
MCHC RBC AUTO-ENTMCNC: 35.1 G/DL (ref 31.5–35.7)
MCV RBC AUTO: 87.2 FL (ref 79–97)
MONOCYTES # BLD AUTO: 0.59 10*3/MM3 (ref 0.1–0.9)
MONOCYTES NFR BLD AUTO: 17.4 % (ref 5–12)
NEUTROPHILS NFR BLD AUTO: 1.7 10*3/MM3 (ref 1.7–7)
NEUTROPHILS NFR BLD AUTO: 50.1 % (ref 42.7–76)
NRBC BLD AUTO-RTO: 0 /100 WBC (ref 0–0.2)
PHOSPHATE SERPL-MCNC: 3.4 MG/DL (ref 2.5–4.5)
PLATELET # BLD AUTO: 238 10*3/MM3 (ref 140–450)
PMV BLD AUTO: 12.6 FL (ref 6–12)
POTASSIUM SERPL-SCNC: 4.2 MMOL/L (ref 3.5–5.2)
PTH-INTACT SERPL-MCNC: 46.4 PG/ML (ref 15–65)
RBC # BLD AUTO: 4.05 10*6/MM3 (ref 4.14–5.8)
SODIUM SERPL-SCNC: 134 MMOL/L (ref 136–145)
TIBC SERPL-MCNC: 319 MCG/DL (ref 298–536)
TRANSFERRIN SERPL-MCNC: 214 MG/DL (ref 200–360)
WBC NRBC COR # BLD: 3.39 10*3/MM3 (ref 3.4–10.8)

## 2021-12-16 ENCOUNTER — OFFICE VISIT (OUTPATIENT)
Dept: FAMILY MEDICINE CLINIC | Facility: CLINIC | Age: 71
End: 2021-12-16

## 2021-12-16 VITALS
SYSTOLIC BLOOD PRESSURE: 130 MMHG | HEIGHT: 72 IN | TEMPERATURE: 97.3 F | OXYGEN SATURATION: 99 % | BODY MASS INDEX: 20.71 KG/M2 | DIASTOLIC BLOOD PRESSURE: 68 MMHG | HEART RATE: 69 BPM | WEIGHT: 152.9 LBS

## 2021-12-16 DIAGNOSIS — Z72.0 TOBACCO USE: ICD-10-CM

## 2021-12-16 DIAGNOSIS — I10 ESSENTIAL HYPERTENSION: Primary | ICD-10-CM

## 2021-12-16 DIAGNOSIS — E78.2 MIXED HYPERLIPIDEMIA: ICD-10-CM

## 2021-12-16 PROCEDURE — 99213 OFFICE O/P EST LOW 20 MIN: CPT | Performed by: STUDENT IN AN ORGANIZED HEALTH CARE EDUCATION/TRAINING PROGRAM

## 2021-12-16 RX ORDER — ERGOCALCIFEROL 1.25 MG/1
50000 CAPSULE ORAL WEEKLY
COMMUNITY
Start: 2021-11-01 | End: 2023-01-27

## 2021-12-16 RX ORDER — SODIUM BICARBONATE 650 MG/1
1 TABLET ORAL 2 TIMES DAILY
COMMUNITY
Start: 2021-11-27

## 2021-12-16 RX ORDER — FERROUS SULFATE 324(65)MG
324 TABLET, DELAYED RELEASE (ENTERIC COATED) ORAL DAILY
COMMUNITY
Start: 2021-08-10 | End: 2021-12-16 | Stop reason: SDUPTHER

## 2021-12-28 DIAGNOSIS — I65.23 BILATERAL CAROTID ARTERY STENOSIS: Primary | ICD-10-CM

## 2022-01-04 NOTE — PROGRESS NOTES
I have reviewed the patient.  I have reviewed the notes, assessments, and/or procedures performed by Dr Stephanie Mai, I concur with his  documentation and assessment and plan for Dwight Ryder.          This document has been electronically signed by Gilbert Smith MD on January 4, 2022 11:44 CST

## 2022-02-28 ENCOUNTER — TELEPHONE (OUTPATIENT)
Dept: FAMILY MEDICINE CLINIC | Facility: CLINIC | Age: 72
End: 2022-02-28

## 2022-02-28 DIAGNOSIS — I10 ESSENTIAL HYPERTENSION: ICD-10-CM

## 2022-02-28 RX ORDER — DILTIAZEM HYDROCHLORIDE 300 MG/1
300 CAPSULE, COATED, EXTENDED RELEASE ORAL DAILY
Qty: 30 CAPSULE | Refills: 2 | Status: SHIPPED | OUTPATIENT
Start: 2022-02-28 | End: 2022-04-11

## 2022-02-28 NOTE — TELEPHONE ENCOUNTER
Incoming Refill Request      Medication requested (name and dose):   dilTIAZem CD (CARDIZEM CD) 300 MG 24 hr capsule    Pharmacy where request should be sent: [facility]    Additional details provided by patient:     Best call back number: [phone]    Does the patient have less than a 3 day supply:  [] Yes  [x] No    Tereza Dwyer  02/28/22, 13:59 CST

## 2022-03-04 ENCOUNTER — OFFICE VISIT (OUTPATIENT)
Dept: CARDIAC SURGERY | Facility: CLINIC | Age: 72
End: 2022-03-04

## 2022-03-04 VITALS
HEART RATE: 68 BPM | WEIGHT: 152 LBS | BODY MASS INDEX: 20.59 KG/M2 | HEIGHT: 72 IN | SYSTOLIC BLOOD PRESSURE: 120 MMHG | OXYGEN SATURATION: 100 % | DIASTOLIC BLOOD PRESSURE: 71 MMHG

## 2022-03-04 DIAGNOSIS — I65.23 CAROTID STENOSIS, ASYMPTOMATIC, BILATERAL: Primary | ICD-10-CM

## 2022-03-04 DIAGNOSIS — I73.9 PERIPHERAL VASCULAR DISEASE: ICD-10-CM

## 2022-03-04 DIAGNOSIS — I65.23 BILATERAL CAROTID ARTERY STENOSIS: ICD-10-CM

## 2022-03-04 PROCEDURE — 99214 OFFICE O/P EST MOD 30 MIN: CPT | Performed by: NURSE PRACTITIONER

## 2022-03-07 NOTE — PROGRESS NOTES
CVTS Office Progress Note     Dwight Ryder  1950    Chief Complaint:    Chief Complaint   Patient presents with   • Carotid Artery Disease       HPI:      PCP:  Stephanie Mai MD    71 y.o. male with HTN(stable, increased risk stroke, rupture), Hyperlipidemia(stable, increased risk cardiovascular events) and Smoker(uncontrolled, increased risk cardiovascular events) BPH, PVD (stable, increased risk cardiovascular events).  smokes 1 PPD.  Established with [facility] in 2013 with lifestyle limiting claudication, prior R SFA stent occluded.  Underwent bypass which occluded the following year requiring lysis.  Unfortunately he continues to smoke.  No complaints of claudication at today's visit.  No other associated signs, symptoms or modifying factors.  Underwent R TCAR, no new deficits, did well post operatively.     2012 RIGHT SFA stent  2013 RIGHT femoral to popliteal artery bypass (PTFE)  12/15/2014 RIGHT lower extremity angiogram:  RIGHT fem-pop thrombolysis, EKOS.  12/2020 Restudy: Graft patent  11/22/2016 RANGEL:  RIGHT 1.1 triphasic.  LEFT 1.1 triphasic.  Graft 169cm/s, triphasic.  5/22/2017 RANGEL:  RIGHT 1.1 triphasic.  LEFT 1.0 triphasic.  Graft 186cm/s, triphasic.  12/06/17   RANGEL:  RIGHT 1.12  Triphasic.  LEFT 0.99  Triphasic.  Graft Patent 237 cm/s, triphasic.  06/07/18  RANGEL:  RIGHT 0.99  Triphasic.  LEFT 0.98  Triphasic.   12/18/18  RANGEL: Right 1.04 Fem triphasic Poptriphasic DP triphasic PT triphasic, Left 0.95 Fem triphasic Poptriphasic DP triphasic PT triphasic  12/18/18  U/S Graft Survey: RT PTFE bypass patent mPSV 236 at proximal anastomosis   7/16/19 RANGEL: Right 1.07 triphasic.  Left 0.99 triphasic.  7/2020 RANGEL: Right 1.0 triphasic.  Left 1.05 triphasic.   7/2020 Right Graft US: Patent, [id number] proximal anastamosis  1/2021 RANGEL: Right 0.9 triphasic.  Left 0.94 triphasic.     7/2020 Carotid Duplex: GELACIO 50-69% mPSV 133c/s Ratio 1.3, LICA 50-69% mPSV 139c/s Ratio 0.8, Antegrade vertebrals  1/2021  Carotid Duplex: GELACIO >70% mPSV 235c/s Ratio 1.9, LICA 50-69% mPSV 154c/s Ratio 1.2, Antegrade vertebrals  1/27/2021:CTA Carotids: GELACIO 80% RSCA 60% LICA 40%  3/5/2021 R TCAR  4/2021 Carotid Duplex: GELACIO 50-69% mPSV 139c/s Ratio 2.1, Antegrade vertebrals  7/2021 Carotid Duplex: GELACIO 0-49% mPSV 119c/s Ratio 1.3, LICA 0-49% mPSV 113c/s Ratio 0.6, Antegrade vertebrals    The following portions of the patient's history were reviewed and updated as appropriate: allergies, current medications, past family history, past medical history, past social history, past surgical history and problem list.  Recent images independently reviewed.  Available laboratory values reviewed.    PMH:  Past Medical History:   Diagnosis Date   • Artificial lens present     right   • Astigmatism     myopic   • Atherosclerosis of native arteries of extremity with intermittent claudication (HCC)    • Benign essential hypertension    • Benign prostatic hyperplasia    • Cataract     R>L   • Corneal foreign body     Left eye, 2 years ago      • Essential hypertension    • Examination     individual health   • Exanthematous disorder    • Fracture of foot    • Hypercholesterolemia    • Hyperkalemia    • Hyperlipidemia    • Hypertensive disorder    • Male erectile disorder    • Need for vaccination    • Peripheral vascular disease (HCC)     Post RIGHT fem-pop bypass graft 1/7/13 Post thrombolysis to graft 12/15/14      • Peripheral vascular disease (HCC)     unspecified   • Posterior subcapsular polar senile cataract    • Surgical follow-up care     R fem->pop bypass 1/7/13      • Tobacco dependence syndrome    • Tobacco user    • Urticaria     will give kenalog and benadyl , will order allery test      • Visual discomfort      Past Surgical History:   Procedure Laterality Date   • ANGIOPLASTY      femoral-popliteal artery (dilation) (Abdominal aortogram, bilateral lower extremity runoff. Repair of left common femoral artery.)   11/26/2012    • EYE  SURGERY     • FEMORAL POPLITEAL BYPASS      Right with 6 mm [place]-baron graft)   01/07/2013    • INJECTION OF MEDICATION  03/04/2013    kenalog(3)    • OTHER SURGICAL HISTORY      Remove cataract, insert lens (Cataract extraction with intraocular lens implantation, right eye.)   11/26/2013    • VASCULAR SURGERY       Family History   Problem Relation Age of Onset   • Cancer Other    • Heart disease Other    • Hypertension Other    • Stroke Other      Social History     Tobacco Use   • Smoking status: Current Every Day Smoker     Packs/day: 1.50     Years: 30.00     Pack years: 45.00     Types: Cigarettes   • Smokeless tobacco: Never Used   Vaping Use   • Vaping Use: Never used   Substance Use Topics   • Alcohol use: Yes     Alcohol/week: 6.0 standard drinks     Types: 6 Cans of beer per week     Comment: daily   • Drug use: No       ALLERGIES:  Allergies   Allergen Reactions   • Lisinopril Angioedema         MEDICATIONS:    Current Outpatient Medications:   •  acetaminophen (TYLENOL) 325 MG tablet, Take 2 tablets by mouth Every 4 (Four) Hours As Needed for Mild Pain ., Disp: 60 tablet, Rfl: 0  •  aspirin 81 MG EC tablet, Take 1 tablet by mouth Daily., Disp: 30 tablet, Rfl: 1  •  atorvastatin (LIPITOR) 80 MG tablet, TAKE 1 TABLET BY MOUTH DAILY, Disp: 90 tablet, Rfl: 3  •  clopidogrel (PLAVIX) 75 MG tablet, TAKE 1 TABLET BY MOUTH DAILY, Disp: 90 tablet, Rfl: 3  •  dilTIAZem CD (CARDIZEM CD) 300 MG 24 hr capsule, Take 1 capsule by mouth Daily., Disp: 30 capsule, Rfl: 2  •  ferrous sulfate 324 MG tablet delayed-release, Take 1 tablet by mouth Daily With Breakfast., Disp: 30 tablet, Rfl: 5  •  hydrALAZINE (APRESOLINE) 25 MG tablet, Take 1 tablet by mouth 2 (Two) Times a Day., Disp: 60 tablet, Rfl: 1  •  losartan (COZAAR) 25 MG tablet, Take 25 mg by mouth Daily., Disp: , Rfl:   •  ondansetron (ZOFRAN) 4 MG tablet, Take 1 tablet by mouth Every 6 (Six) Hours As Needed for Nausea or Vomiting., Disp: 30 tablet, Rfl: 0  •   "sennosides-docusate (senna-docusate sodium) 8.6-50 MG per tablet, Take 2 tablets by mouth 2 (Two) Times a Day As Needed for Constipation., Disp: , Rfl:   •  sodium bicarbonate 650 MG tablet, Take 1 tablet by mouth 2 (Two) Times a Day., Disp: , Rfl:   •  vitamin D (ERGOCALCIFEROL) 1.25 MG (75335 UT) capsule capsule, Take 50,000 Units by mouth 1 (One) Time Per Week., Disp: , Rfl:       Review of Systems   Constitutional: Negative for chills, decreased appetite, fever and weight loss.   HENT: Negative for congestion, nosebleeds and sore throat.    Eyes: Negative for blurred vision, visual disturbance and visual halos.   Cardiovascular: Negative for chest pain, dyspnea on exertion and leg swelling.   Respiratory: Negative for cough, shortness of breath, sputum production and wheezing.    Endocrine: Negative for cold intolerance and polyuria.   Hematologic/Lymphatic: Negative for bleeding problem. Bruises/bleeds easily.   Skin: Positive for unusual hair distribution. Negative for flushing and nail changes.   Musculoskeletal: Positive for arthritis, back pain and joint pain.   Gastrointestinal: Negative for bloating, abdominal pain, hematemesis, melena, nausea and vomiting.   Genitourinary: Negative for flank pain and hematuria.   Neurological: Negative for brief paralysis, difficulty with concentration, focal weakness, light-headedness, loss of balance, numbness, paresthesias and weakness.   Psychiatric/Behavioral: Negative for altered mental status, depression, substance abuse and suicidal ideas.   Allergic/Immunologic: Negative for hives and persistent infections.         Vitals:    03/04/22 1323   BP: 120/71   BP Location: Left arm   Patient Position: Sitting   Cuff Size: Adult   Pulse: 68   SpO2: 100%   Weight: 68.9 kg (152 lb)   Height: 182.9 cm (72\")     Physical Exam   Constitutional: He is oriented to person, place, and time. He appears well-developed.   HENT:   Head: Normocephalic and atraumatic.   Eyes: Pupils " are equal, round, and reactive to light. Conjunctivae are normal.   Cardiovascular: Normal rate.   No murmur heard.  Pulmonary/Chest: Effort normal and breath sounds normal.   Abdominal: Soft. Bowel sounds are normal.   Musculoskeletal: Normal range of motion. No tenderness.   Neurological: He is alert and oriented to person, place, and time. No cranial nerve deficit.   Skin: Skin is warm and dry. Capillary refill takes less than 2 seconds.   There is no evidence of skin breakdown of the bilateral lower extremities.  BLE pink, no evidence of ischemia.  Feet are absent of dependent rubor.   Psychiatric: Judgment normal.   Nursing note and vitals reviewed.      Assessment & Plan     Independent Review of Studies  3/2022 Carotid Duplex: GELACIO 0-49% mPSV 111c/s Ratio 2.3, LICA 0-49% mPSV 86c/s Ratio 1.6, Antegrade vertebrals    1. Carotid stenosis, asymptomatic, bilateral  No new deficits, s/p R TCAR  Duplex with stable velocities post procedure  ASA, Plavix, Statin    No reperfusion symptoms    Repeat duplex bilateral in 6 months    2. Tobacco use  Smoking cessation assistance options offered including behavioral counseling (Smoking Cessation Classes), Nicotine replacement therapy (patches or gum), pharmacologic therapy (Chantix, Wellbutrin). Understands tobacco increases risk of expanding AAA, MI, CVA, PAD, carcinoma. Discussion and question answer period 5-7 minutes.    3. Hyperlipidemia  Lipid-lowering therapy has been proven beneficial in patients with cardio-vascular disease. Current guidelines recommend statin treatment for all patients with PAD,CAD and carotid stenosis. Statins are beneficial in preventing cardiovascular events, increasing functional capacity and lower the risk of adverse limb loss in PAD. Statins decrease the progression of plaque formation and may improve peripheral vessel lining, and aid in reversing atherosclerosis.    4. Peripheral vascular disease (HCC)  Repeat RANGEL, Right fem pop ultrasound  next visit  DAPT, Statin    - Doppler Ankle Brachial Index Single Level CAR; Future  - Duplex Lower Extremity Art / Grafts - Left CAR; Future                This document has been electronically signed by SCOUT Fuentes on March 7, 2022 15:46 CST

## 2022-03-23 DIAGNOSIS — I10 ESSENTIAL HYPERTENSION: ICD-10-CM

## 2022-03-23 RX ORDER — HYDRALAZINE HYDROCHLORIDE 25 MG/1
TABLET, FILM COATED ORAL
Qty: 60 TABLET | Refills: 1 | Status: SHIPPED | OUTPATIENT
Start: 2022-03-23 | End: 2022-05-16

## 2022-03-24 RX ORDER — HYDRALAZINE HYDROCHLORIDE 25 MG/1
TABLET, FILM COATED ORAL
Qty: 180 TABLET | OUTPATIENT
Start: 2022-03-24

## 2022-04-11 DIAGNOSIS — I10 ESSENTIAL HYPERTENSION: ICD-10-CM

## 2022-04-11 RX ORDER — DILTIAZEM HYDROCHLORIDE 300 MG/1
300 CAPSULE, COATED, EXTENDED RELEASE ORAL DAILY
Qty: 30 CAPSULE | Refills: 2 | Status: SHIPPED | OUTPATIENT
Start: 2022-04-11 | End: 2022-05-25 | Stop reason: SDUPTHER

## 2022-04-18 ENCOUNTER — OFFICE VISIT (OUTPATIENT)
Dept: FAMILY MEDICINE CLINIC | Facility: CLINIC | Age: 72
End: 2022-04-18

## 2022-04-18 VITALS
HEART RATE: 78 BPM | WEIGHT: 151 LBS | SYSTOLIC BLOOD PRESSURE: 120 MMHG | HEIGHT: 72 IN | OXYGEN SATURATION: 97 % | DIASTOLIC BLOOD PRESSURE: 68 MMHG | BODY MASS INDEX: 20.45 KG/M2

## 2022-04-18 DIAGNOSIS — I10 ESSENTIAL HYPERTENSION: Primary | ICD-10-CM

## 2022-04-18 PROCEDURE — 99213 OFFICE O/P EST LOW 20 MIN: CPT | Performed by: STUDENT IN AN ORGANIZED HEALTH CARE EDUCATION/TRAINING PROGRAM

## 2022-04-18 NOTE — PROGRESS NOTES
Family Medicine Residency  Stephanie Mai MD    Subjective:     Dwight Ryder is a 71 y.o. male who presents for follow-up of HTN. Currently on Hydralazine 25mg TID, Losartan 25mg, and Cardizem 300mg. BP 130s/80s at home. Denies chest pain, headaches, palpitations, nausea, vomiting. He is still smoking and not interested in cessation options.    The following portions of the patient's history were reviewed and updated as appropriate: allergies, current medications, past family history, past medical history, past social history, past surgical history and problem list.    Past Medical Hx:  Past Medical History:   Diagnosis Date   • Artificial lens present     right   • Astigmatism     myopic   • Atherosclerosis of native arteries of extremity with intermittent claudication (HCC)    • Benign essential hypertension    • Benign prostatic hyperplasia    • Cataract     R>L   • Corneal foreign body     Left eye, 2 years ago      • Essential hypertension    • Examination     individual health   • Exanthematous disorder    • Fracture of foot    • Hypercholesterolemia    • Hyperkalemia    • Hyperlipidemia    • Hypertensive disorder    • Male erectile disorder    • Need for vaccination    • Peripheral vascular disease (HCC)     Post RIGHT fem-pop bypass graft 1/7/13 Post thrombolysis to graft 12/15/14      • Peripheral vascular disease (HCC)     unspecified   • Posterior subcapsular polar senile cataract    • Surgical follow-up care     R fem->pop bypass 1/7/13      • Tobacco dependence syndrome    • Tobacco user    • Urticaria     will give kenalog and benadyl , will order allery test      • Visual discomfort        Past Surgical Hx:  Past Surgical History:   Procedure Laterality Date   • ANGIOPLASTY      femoral-popliteal artery (dilation) (Abdominal aortogram, bilateral lower extremity runoff. Repair of left common femoral artery.)   11/26/2012    • EYE SURGERY     • FEMORAL POPLITEAL BYPASS      Right with 6 mm  [place]-baron graft)   01/07/2013    • INJECTION OF MEDICATION  03/04/2013    kenalog(3)    • OTHER SURGICAL HISTORY      Remove cataract, insert lens (Cataract extraction with intraocular lens implantation, right eye.)   11/26/2013    • VASCULAR SURGERY         Current Meds:    Current Outpatient Medications:   •  acetaminophen (TYLENOL) 325 MG tablet, Take 2 tablets by mouth Every 4 (Four) Hours As Needed for Mild Pain ., Disp: 60 tablet, Rfl: 0  •  aspirin 81 MG EC tablet, Take 1 tablet by mouth Daily., Disp: 30 tablet, Rfl: 1  •  atorvastatin (LIPITOR) 80 MG tablet, TAKE 1 TABLET BY MOUTH DAILY, Disp: 90 tablet, Rfl: 3  •  clopidogrel (PLAVIX) 75 MG tablet, TAKE 1 TABLET BY MOUTH DAILY, Disp: 90 tablet, Rfl: 3  •  dilTIAZem CD (CARDIZEM CD) 300 MG 24 hr capsule, TAKE 1 CAPSULE BY MOUTH DAILY, Disp: 30 capsule, Rfl: 2  •  ferrous sulfate 324 MG tablet delayed-release, Take 1 tablet by mouth Daily With Breakfast., Disp: 30 tablet, Rfl: 5  •  hydrALAZINE (APRESOLINE) 25 MG tablet, TAKE 1 TABLET BY MOUTH TWICE DAILY, Disp: 60 tablet, Rfl: 1  •  losartan (COZAAR) 25 MG tablet, Take 25 mg by mouth Daily., Disp: , Rfl:   •  ondansetron (ZOFRAN) 4 MG tablet, Take 1 tablet by mouth Every 6 (Six) Hours As Needed for Nausea or Vomiting., Disp: 30 tablet, Rfl: 0  •  sennosides-docusate (senna-docusate sodium) 8.6-50 MG per tablet, Take 2 tablets by mouth 2 (Two) Times a Day As Needed for Constipation., Disp: , Rfl:   •  sodium bicarbonate 650 MG tablet, Take 1 tablet by mouth 2 (Two) Times a Day., Disp: , Rfl:   •  vitamin D (ERGOCALCIFEROL) 1.25 MG (25869 UT) capsule capsule, Take 50,000 Units by mouth 1 (One) Time Per Week., Disp: , Rfl:     Allergies:  Allergies   Allergen Reactions   • Lisinopril Angioedema       Family Hx:  Family History   Problem Relation Age of Onset   • Cancer Other    • Heart disease Other    • Hypertension Other    • Stroke Other         Social History:  Social History     Socioeconomic History   •  "Marital status: Single   Tobacco Use   • Smoking status: Current Every Day Smoker     Packs/day: 1.50     Years: 30.00     Pack years: 45.00     Types: Cigarettes   • Smokeless tobacco: Never Used   Vaping Use   • Vaping Use: Never used   Substance and Sexual Activity   • Alcohol use: Yes     Alcohol/week: 6.0 standard drinks     Types: 6 Cans of beer per week     Comment: daily   • Drug use: No   • Sexual activity: Defer       Review of Systems  Review of Systems   Constitutional: Negative for activity change, appetite change, chills, fatigue and fever.   HENT: Negative for congestion, rhinorrhea, sinus pain and sore throat.    Eyes: Negative for pain, redness and visual disturbance.   Respiratory: Negative for cough, shortness of breath and wheezing.    Cardiovascular: Negative for chest pain, palpitations and leg swelling.   Gastrointestinal: Negative for abdominal pain, constipation, diarrhea, nausea and vomiting.   Genitourinary: Negative for dysuria and flank pain.   Musculoskeletal: Negative for arthralgias, joint swelling and myalgias.   Skin: Negative for color change, pallor and rash.   Neurological: Negative for dizziness, numbness and headaches.   Psychiatric/Behavioral: Negative for dysphoric mood and sleep disturbance. The patient is not nervous/anxious.        Objective:     /68   Pulse 78   Ht 182.9 cm (72\")   Wt 68.5 kg (151 lb)   SpO2 97%   BMI 20.48 kg/m²   Physical Exam  Constitutional:       General: He is not in acute distress.     Appearance: Normal appearance. He is well-developed. He is not diaphoretic.   HENT:      Head: Normocephalic and atraumatic.      Right Ear: Hearing normal.      Left Ear: Hearing normal.   Eyes:      General: Lids are normal.      Conjunctiva/sclera: Conjunctivae normal.   Cardiovascular:      Rate and Rhythm: Normal rate and regular rhythm.      Heart sounds: Normal heart sounds.   Pulmonary:      Effort: Pulmonary effort is normal. No respiratory " distress.      Breath sounds: Normal breath sounds. No wheezing or rales.   Abdominal:      General: Bowel sounds are normal. There is no distension.      Palpations: Abdomen is soft.      Tenderness: There is no abdominal tenderness. There is no guarding.   Musculoskeletal:         General: No tenderness or deformity. Normal range of motion.      Right lower leg: No edema.      Left lower leg: No edema.   Skin:     General: Skin is warm and dry.      Coloration: Skin is not pale.      Findings: No erythema or rash.   Neurological:      Mental Status: He is alert and oriented to person, place, and time. He is not disoriented.   Psychiatric:         Speech: Speech normal.         Behavior: Behavior normal.          Assessment/Plan:     Diagnoses and all orders for this visit:    1. Essential hypertension (Primary)    Chronic, well controlled. Continue Hydralazine, Cozaar, Cardizem. Advised to check blood pressure 2-3 times a week at least. Will have patient follow-up in 1 month for medicare wellness.    · Rx changes: see a/p  · Patient Education: see a/p  · Compliance at present is estimated to be good.      Follow-up:     Return in about 4 weeks (around 5/16/2022) for Medicare Wellness.    Preventative:  Health Maintenance   Topic Date Due   • Pneumococcal Vaccine 65+ (2 - PCV) 10/18/2014   • ZOSTER VACCINE (2 of 2) 11/09/2016   • LIPID PANEL  01/09/2019   • ANNUAL WELLNESS VISIT  08/13/2021   • LUNG CANCER SCREENING  01/04/2023 (Originally 10/23/2002)   • INFLUENZA VACCINE  08/01/2022   • TDAP/TD VACCINES (2 - Td or Tdap) 09/14/2026   • COLORECTAL CANCER SCREENING  09/14/2026   • HEPATITIS C SCREENING  Completed   • COVID-19 Vaccine  Completed   • AAA SCREEN (ONE-TIME)  Completed     Male Preventative: AMW  Recommended: Varicella and Pneumovax  Vaccine Counseling: N/A    Weight  -Class: Normal: 18.5-24.9kg/m2  -Patient's Body mass index is 20.48 kg/m². indicating that he is within normal range (BMI 18.5-24.9). No  BMI management plan needed..   eat more fruits and vegetables, decrease soda or juice intake, increase water intake, increase physical activity, reduce screen time and reduce portion size    Alcohol use:  reports current alcohol use of about 6.0 standard drinks of alcohol per week.  Nicotine status  reports that he has been smoking cigarettes. He has a 45.00 pack-year smoking history. He has never used smokeless tobacco.     Goals     •  cut back on smoking to 1/2 ppd (pt-stated)       Barrier:  Dependence              RISK SCORE: 3      Stephanie Mai M.D. PGY3  [facility]  [address] [place] [address]  Office: [phone]  This document has been electronically signed by Stephanie Mai MD on April 21, 2022 10:22 CDT

## 2022-04-22 NOTE — PROGRESS NOTES
I have reviewed the notes, assessments, and/or procedures performed by Stephanie Mai MD, I concur with her/his documentation and assessment and plan for Dwight Ryder.                This document has been electronically signed by Liban Mandujano MD on April 22, 2022 15:52 CDT

## 2022-05-15 DIAGNOSIS — I10 ESSENTIAL HYPERTENSION: ICD-10-CM

## 2022-05-16 DIAGNOSIS — I10 ESSENTIAL HYPERTENSION: ICD-10-CM

## 2022-05-16 RX ORDER — HYDRALAZINE HYDROCHLORIDE 25 MG/1
TABLET, FILM COATED ORAL
Qty: 180 TABLET | Refills: 1 | Status: SHIPPED | OUTPATIENT
Start: 2022-05-16 | End: 2022-11-07

## 2022-05-16 RX ORDER — HYDRALAZINE HYDROCHLORIDE 25 MG/1
TABLET, FILM COATED ORAL
Qty: 60 TABLET | Refills: 1 | Status: SHIPPED | OUTPATIENT
Start: 2022-05-16 | End: 2022-05-16

## 2022-05-19 ENCOUNTER — OFFICE VISIT (OUTPATIENT)
Dept: FAMILY MEDICINE CLINIC | Facility: CLINIC | Age: 72
End: 2022-05-19

## 2022-05-19 VITALS
BODY MASS INDEX: 20.26 KG/M2 | HEIGHT: 72 IN | HEART RATE: 64 BPM | WEIGHT: 149.6 LBS | DIASTOLIC BLOOD PRESSURE: 64 MMHG | OXYGEN SATURATION: 98 % | SYSTOLIC BLOOD PRESSURE: 130 MMHG | TEMPERATURE: 96.9 F

## 2022-05-19 DIAGNOSIS — I10 ESSENTIAL HYPERTENSION: Primary | ICD-10-CM

## 2022-05-19 PROCEDURE — 99213 OFFICE O/P EST LOW 20 MIN: CPT | Performed by: STUDENT IN AN ORGANIZED HEALTH CARE EDUCATION/TRAINING PROGRAM

## 2022-05-25 ENCOUNTER — IMMUNIZATION (OUTPATIENT)
Dept: FAMILY MEDICINE CLINIC | Facility: CLINIC | Age: 72
End: 2022-05-25

## 2022-05-25 DIAGNOSIS — I10 ESSENTIAL HYPERTENSION: ICD-10-CM

## 2022-05-25 PROCEDURE — 0054A COVID-19 (PFIZER) 12+ YRS: CPT | Performed by: SURGERY

## 2022-05-25 PROCEDURE — 91305 COVID-19 (PFIZER) 12+ YRS: CPT | Performed by: SURGERY

## 2022-05-25 RX ORDER — LOSARTAN POTASSIUM 25 MG/1
25 TABLET ORAL DAILY
Qty: 90 TABLET | Refills: 3 | Status: SHIPPED | OUTPATIENT
Start: 2022-05-25 | End: 2023-03-16 | Stop reason: SDUPTHER

## 2022-05-25 RX ORDER — DILTIAZEM HYDROCHLORIDE 300 MG/1
300 CAPSULE, COATED, EXTENDED RELEASE ORAL DAILY
Qty: 30 CAPSULE | Refills: 2 | Status: SHIPPED | OUTPATIENT
Start: 2022-05-25 | End: 2022-08-29 | Stop reason: SDUPTHER

## 2022-05-25 NOTE — PROGRESS NOTES
Family Medicine Residency  Stephanie Mai MD    Subjective:     Dwight Ryder is a 71 y.o. male who presents for follow-up of HTN. Currently on Cozaar 25mg, Hydralazine 25mg TID, and Cardizem 300mg. Patient does not check BP at home; 130/64 in office today. Denies headache, chest pain, palpitations, headaches.    The following portions of the patient's history were reviewed and updated as appropriate: allergies, current medications, past family history, past medical history, past social history, past surgical history and problem list.    Past Medical Hx:  Past Medical History:   Diagnosis Date   • Artificial lens present     right   • Astigmatism     myopic   • Atherosclerosis of native arteries of extremity with intermittent claudication (HCC)    • Benign essential hypertension    • Benign prostatic hyperplasia    • Cataract     R>L   • Corneal foreign body     Left eye, 2 years ago      • Essential hypertension    • Examination     individual health   • Exanthematous disorder    • Fracture of foot    • Hypercholesterolemia    • Hyperkalemia    • Hyperlipidemia    • Hypertensive disorder    • Male erectile disorder    • Need for vaccination    • Peripheral vascular disease (HCC)     Post RIGHT fem-pop bypass graft 1/7/13 Post thrombolysis to graft 12/15/14      • Peripheral vascular disease (HCC)     unspecified   • Posterior subcapsular polar senile cataract    • Surgical follow-up care     R fem->pop bypass 1/7/13      • Tobacco dependence syndrome    • Tobacco user    • Urticaria     will give kenalog and benadyl , will order allery test      • Visual discomfort        Past Surgical Hx:  Past Surgical History:   Procedure Laterality Date   • ANGIOPLASTY      femoral-popliteal artery (dilation) (Abdominal aortogram, bilateral lower extremity runoff. Repair of left common femoral artery.)   11/26/2012    • EYE SURGERY     • FEMORAL POPLITEAL BYPASS      Right with 6 mm [place]-baron graft)   01/07/2013    •  INJECTION OF MEDICATION  03/04/2013    kenalog(3)    • OTHER SURGICAL HISTORY      Remove cataract, insert lens (Cataract extraction with intraocular lens implantation, right eye.)   11/26/2013    • VASCULAR SURGERY         Current Meds:    Current Outpatient Medications:   •  acetaminophen (TYLENOL) 325 MG tablet, Take 2 tablets by mouth Every 4 (Four) Hours As Needed for Mild Pain ., Disp: 60 tablet, Rfl: 0  •  aspirin 81 MG EC tablet, Take 1 tablet by mouth Daily., Disp: 30 tablet, Rfl: 1  •  atorvastatin (LIPITOR) 80 MG tablet, TAKE 1 TABLET BY MOUTH DAILY, Disp: 90 tablet, Rfl: 3  •  clopidogrel (PLAVIX) 75 MG tablet, TAKE 1 TABLET BY MOUTH DAILY, Disp: 90 tablet, Rfl: 3  •  dilTIAZem CD (CARDIZEM CD) 300 MG 24 hr capsule, TAKE 1 CAPSULE BY MOUTH DAILY, Disp: 30 capsule, Rfl: 2  •  ferrous sulfate 324 MG tablet delayed-release, Take 1 tablet by mouth Daily With Breakfast., Disp: 30 tablet, Rfl: 5  •  hydrALAZINE (APRESOLINE) 25 MG tablet, TAKE 1 TABLET BY MOUTH TWICE DAILY, Disp: 180 tablet, Rfl: 1  •  losartan (COZAAR) 25 MG tablet, Take 25 mg by mouth Daily., Disp: , Rfl:   •  ondansetron (ZOFRAN) 4 MG tablet, Take 1 tablet by mouth Every 6 (Six) Hours As Needed for Nausea or Vomiting., Disp: 30 tablet, Rfl: 0  •  sennosides-docusate (senna-docusate sodium) 8.6-50 MG per tablet, Take 2 tablets by mouth 2 (Two) Times a Day As Needed for Constipation., Disp: , Rfl:   •  sodium bicarbonate 650 MG tablet, Take 1 tablet by mouth 2 (Two) Times a Day., Disp: , Rfl:   •  vitamin D (ERGOCALCIFEROL) 1.25 MG (36136 UT) capsule capsule, Take 50,000 Units by mouth 1 (One) Time Per Week., Disp: , Rfl:     Allergies:  Allergies   Allergen Reactions   • Lisinopril Angioedema       Family Hx:  Family History   Problem Relation Age of Onset   • Cancer Other    • Heart disease Other    • Hypertension Other    • Stroke Other         Social History:  Social History     Socioeconomic History   • Marital status: Single   Tobacco  "Use   • Smoking status: Current Every Day Smoker     Packs/day: 1.50     Years: 30.00     Pack years: 45.00     Types: Cigarettes   • Smokeless tobacco: Never Used   Vaping Use   • Vaping Use: Never used   Substance and Sexual Activity   • Alcohol use: Yes     Alcohol/week: 6.0 standard drinks     Types: 6 Cans of beer per week     Comment: daily   • Drug use: No   • Sexual activity: Defer       Review of Systems  Review of Systems   Constitutional: Negative for activity change, appetite change, chills, fatigue and fever.   HENT: Negative for congestion, rhinorrhea, sinus pain and sore throat.    Eyes: Negative for pain, redness and visual disturbance.   Respiratory: Negative for cough, shortness of breath and wheezing.    Cardiovascular: Negative for chest pain, palpitations and leg swelling.   Gastrointestinal: Negative for abdominal pain, constipation, diarrhea, nausea and vomiting.   Genitourinary: Negative for dysuria and flank pain.   Musculoskeletal: Negative for arthralgias, joint swelling and myalgias.   Skin: Negative for color change, pallor and rash.   Neurological: Negative for dizziness, numbness and headaches.   Psychiatric/Behavioral: Negative for dysphoric mood and sleep disturbance. The patient is not nervous/anxious.        Objective:     /64   Pulse 64   Temp 96.9 °F (36.1 °C)   Ht 182.9 cm (72\")   Wt 67.9 kg (149 lb 9.6 oz)   SpO2 98%   BMI 20.29 kg/m²   Physical Exam  Constitutional:       General: He is not in acute distress.     Appearance: Normal appearance. He is well-developed. He is not diaphoretic.   HENT:      Head: Normocephalic and atraumatic.      Right Ear: Hearing normal.      Left Ear: Hearing normal.   Eyes:      General: Lids are normal.      Conjunctiva/sclera: Conjunctivae normal.   Cardiovascular:      Rate and Rhythm: Normal rate and regular rhythm.      Heart sounds: Normal heart sounds.   Pulmonary:      Effort: Pulmonary effort is normal. No respiratory " distress.      Breath sounds: Normal breath sounds. No wheezing or rales.   Abdominal:      General: Bowel sounds are normal. There is no distension.      Palpations: Abdomen is soft.      Tenderness: There is no abdominal tenderness. There is no guarding.   Musculoskeletal:         General: No tenderness or deformity. Normal range of motion.      Right lower leg: No edema.      Left lower leg: No edema.   Skin:     General: Skin is warm and dry.      Coloration: Skin is not pale.      Findings: No erythema or rash.   Neurological:      Mental Status: He is alert and oriented to person, place, and time. He is not disoriented.   Psychiatric:         Speech: Speech normal.         Behavior: Behavior normal.          Assessment/Plan:     Diagnoses and all orders for this visit:    1. Essential hypertension (Primary)    Chronic, well controlled. Continue Cozaar 25mg, Hydralazine 25mg TID, and Cardizem 300mg. RTC 6 months.    · Rx changes: see a/p  · Patient Education: see a/p  · Compliance at present is estimated to be good.      Follow-up:     Return in about 6 months (around 11/19/2022) for Recheck.    Preventative:  Health Maintenance   Topic Date Due   • ZOSTER VACCINE (2 of 2) 11/09/2016   • LIPID PANEL  01/09/2019   • ANNUAL WELLNESS VISIT  08/13/2021   • COVID-19 Vaccine (4 - Booster for Pfizer series) 02/21/2022   • LUNG CANCER SCREENING  01/04/2023 (Originally 10/23/2002)   • Pneumococcal Vaccine 65+ (2 - PCV) 05/19/2023 (Originally 10/18/2014)   • INFLUENZA VACCINE  08/01/2022   • TDAP/TD VACCINES (2 - Td or Tdap) 09/14/2026   • COLORECTAL CANCER SCREENING  09/14/2026   • HEPATITIS C SCREENING  Completed   • AAA SCREEN (ONE-TIME)  Completed       Alcohol use:  reports current alcohol use of about 6.0 standard drinks of alcohol per week.  Nicotine status  reports that he has been smoking cigarettes. He has a 45.00 pack-year smoking history. He has never used smokeless tobacco.     Goals     •  cut back on  smoking to 1/2 ppd (pt-stated)       Barrier:  Dependence              RISK SCORE: 3      Stephanie Mai M.D. PGY3  [facility]  [address] [place] [address]  Office: [phone]  This document has been electronically signed by Stephanie Mai MD on May 25, 2022 10:30 CDT

## 2022-06-06 ENCOUNTER — LAB (OUTPATIENT)
Dept: LAB | Facility: HOSPITAL | Age: 72
End: 2022-06-06

## 2022-06-06 ENCOUNTER — TRANSCRIBE ORDERS (OUTPATIENT)
Dept: LAB | Facility: HOSPITAL | Age: 72
End: 2022-06-06

## 2022-06-06 DIAGNOSIS — N18.31 STAGE 3A CHRONIC KIDNEY DISEASE: ICD-10-CM

## 2022-06-06 DIAGNOSIS — I10 ESSENTIAL HYPERTENSION: ICD-10-CM

## 2022-06-06 DIAGNOSIS — Z72.0 TOBACCO USE: ICD-10-CM

## 2022-06-06 DIAGNOSIS — N18.31 STAGE 3A CHRONIC KIDNEY DISEASE: Primary | ICD-10-CM

## 2022-06-06 DIAGNOSIS — E55.9 VITAMIN D DEFICIENCY: ICD-10-CM

## 2022-06-06 LAB
ALBUMIN SERPL-MCNC: 4.3 G/DL (ref 3.5–5.2)
ANION GAP SERPL CALCULATED.3IONS-SCNC: 10.6 MMOL/L (ref 5–15)
BUN SERPL-MCNC: 17 MG/DL (ref 8–23)
BUN/CREAT SERPL: 11.5 (ref 7–25)
CALCIUM SPEC-SCNC: 9.5 MG/DL (ref 8.6–10.5)
CHLORIDE SERPL-SCNC: 104 MMOL/L (ref 98–107)
CO2 SERPL-SCNC: 21.4 MMOL/L (ref 22–29)
CREAT SERPL-MCNC: 1.48 MG/DL (ref 0.76–1.27)
EGFRCR SERPLBLD CKD-EPI 2021: 50.9 ML/MIN/1.73
GLUCOSE SERPL-MCNC: 81 MG/DL (ref 65–99)
IRON 24H UR-MRATE: 83 MCG/DL (ref 59–158)
IRON SATN MFR SERPL: 26 % (ref 20–50)
MAGNESIUM SERPL-MCNC: 2 MG/DL (ref 1.6–2.4)
PHOSPHATE SERPL-MCNC: 3 MG/DL (ref 2.5–4.5)
POTASSIUM SERPL-SCNC: 4.7 MMOL/L (ref 3.5–5.2)
SODIUM SERPL-SCNC: 136 MMOL/L (ref 136–145)
TIBC SERPL-MCNC: 325 MCG/DL (ref 298–536)
TRANSFERRIN SERPL-MCNC: 218 MG/DL (ref 200–360)

## 2022-06-06 PROCEDURE — 85027 COMPLETE CBC AUTOMATED: CPT

## 2022-06-06 PROCEDURE — 83735 ASSAY OF MAGNESIUM: CPT

## 2022-06-06 PROCEDURE — 83540 ASSAY OF IRON: CPT

## 2022-06-06 PROCEDURE — 84466 ASSAY OF TRANSFERRIN: CPT

## 2022-06-06 PROCEDURE — 80069 RENAL FUNCTION PANEL: CPT

## 2022-06-06 PROCEDURE — 36415 COLL VENOUS BLD VENIPUNCTURE: CPT

## 2022-06-07 LAB
DEPRECATED RDW RBC AUTO: 42.5 FL (ref 37–54)
ERYTHROCYTE [DISTWIDTH] IN BLOOD BY AUTOMATED COUNT: 13.2 % (ref 12.3–15.4)
HCT VFR BLD AUTO: 34.5 % (ref 37.5–51)
HGB BLD-MCNC: 12.1 G/DL (ref 13–17.7)
MCH RBC QN AUTO: 30.9 PG (ref 26.6–33)
MCHC RBC AUTO-ENTMCNC: 35.1 G/DL (ref 31.5–35.7)
MCV RBC AUTO: 88.2 FL (ref 79–97)
PLATELET # BLD AUTO: 234 10*3/MM3 (ref 140–450)
PMV BLD AUTO: 12.8 FL (ref 6–12)
RBC # BLD AUTO: 3.91 10*6/MM3 (ref 4.14–5.8)
WBC NRBC COR # BLD: 4.43 10*3/MM3 (ref 3.4–10.8)

## 2022-08-29 ENCOUNTER — TELEPHONE (OUTPATIENT)
Dept: FAMILY MEDICINE CLINIC | Facility: CLINIC | Age: 72
End: 2022-08-29

## 2022-08-29 DIAGNOSIS — I10 ESSENTIAL HYPERTENSION: ICD-10-CM

## 2022-08-29 RX ORDER — DILTIAZEM HYDROCHLORIDE 300 MG/1
300 CAPSULE, COATED, EXTENDED RELEASE ORAL DAILY
Qty: 30 CAPSULE | Refills: 2 | Status: SHIPPED | OUTPATIENT
Start: 2022-08-29 | End: 2022-11-21

## 2022-08-29 NOTE — TELEPHONE ENCOUNTER
Incoming Refill Request      Medication requested (name and dose): dilTIAZem CD (CARDIZEM CD) 300 MG 24 hr capsule    Pharmacy where request should be sent: [facility]  Additional details provided by patient:     Best call back number: [phone]    Does the patient have less than a 3 day supply:  [x] Yes  [] No    Myah Mercedes  08/29/22, 09:43 CDT

## 2022-09-14 ENCOUNTER — OFFICE VISIT (OUTPATIENT)
Dept: CARDIAC SURGERY | Facility: CLINIC | Age: 72
End: 2022-09-14

## 2022-09-14 VITALS
DIASTOLIC BLOOD PRESSURE: 60 MMHG | HEIGHT: 72 IN | SYSTOLIC BLOOD PRESSURE: 137 MMHG | HEART RATE: 63 BPM | WEIGHT: 151 LBS | OXYGEN SATURATION: 99 % | BODY MASS INDEX: 20.45 KG/M2

## 2022-09-14 DIAGNOSIS — I65.23 CAROTID STENOSIS, ASYMPTOMATIC, BILATERAL: Primary | ICD-10-CM

## 2022-09-14 PROCEDURE — 99214 OFFICE O/P EST MOD 30 MIN: CPT | Performed by: NURSE PRACTITIONER

## 2022-09-14 NOTE — PATIENT INSTRUCTIONS
The results of your vascular studies of your right leg shows mild disease with good  circulation, in the left leg shows milddisease with good  circulation.      If signs and symptoms of ischemia should occur including but not limited to pale/blue discoloration of limb, increasing pain with ambulation or at rest, or a non-healing wound. Patient is to notify [facility] center for immediate evaluation.    Carotid stent patent, no residual disease    Repeat RANGEL and carotid in 6 months.

## 2022-09-16 NOTE — PROGRESS NOTES
CVTS Office Progress Note     Dwight Ryder  1950    Chief Complaint:    Chief Complaint   Patient presents with   • Carotid Artery Disease   • Peripheral Vascular Disease       HPI:      PCP:  Sparkle Miranda MD    71 y.o. male with HTN(stable, increased risk stroke, rupture), Hyperlipidemia(stable, increased risk cardiovascular events) and Smoker(uncontrolled, increased risk cardiovascular events) BPH, PVD (stable, increased risk cardiovascular events).  smokes 1 PPD.  Established with [facility]VS in 2013 with lifestyle limiting claudication, prior R SFA stent occluded.  Underwent bypass which occluded the following year requiring lysis.  Unfortunately he continues to smoke.  No complaints of claudication at today's visit.  No other associated signs, symptoms or modifying factors.  Underwent R TCAR, no new deficits, did well post operatively.  No recent hospitalizations or issues.      2012 RIGHT SFA stent  2013 RIGHT femoral to popliteal artery bypass (PTFE)  12/15/2014 RIGHT lower extremity angiogram:  RIGHT fem-pop thrombolysis, EKOS.  12/2020 Restudy: Graft patent  11/22/2016 RANGEL:  RIGHT 1.1 triphasic.  LEFT 1.1 triphasic.  Graft 169cm/s, triphasic.  5/22/2017 RANGEL:  RIGHT 1.1 triphasic.  LEFT 1.0 triphasic.  Graft 186cm/s, triphasic.  12/06/17   RANGEL:  RIGHT 1.12  Triphasic.  LEFT 0.99  Triphasic.  Graft Patent 237 cm/s, triphasic.  06/07/18  RANGEL:  RIGHT 0.99  Triphasic.  LEFT 0.98  Triphasic.   12/18/18  RANGEL: Right 1.04 Fem triphasic Poptriphasic DP triphasic PT triphasic, Left 0.95 Fem triphasic Poptriphasic DP triphasic PT triphasic  12/18/18  U/S Graft Survey: RT PTFE bypass patent mPSV 236 at proximal anastomosis   7/16/19 RANGEL: Right 1.07 triphasic.  Left 0.99 triphasic.  7/2020 RANGEL: Right 1.0 triphasic.  Left 1.05 triphasic.   7/2020 Right Graft US: Patent, [id number] proximal anastamosis  1/2021 RANGEL: Right 0.9 triphasic.  Left 0.94 triphasic.   9/2022 RANGEL: Right 0.95 triphasic.  Left 0.99  triphasic  9/2022  RIGHT Art US: Above knee fem-pop graft patent, mPSV 171cm/s distal graft    7/2020 Carotid Duplex: GELACIO 50-69% mPSV 133c/s Ratio 1.3, LICA 50-69% mPSV 139c/s Ratio 0.8, Antegrade vertebrals  1/2021 Carotid Duplex: GELACIO >70% mPSV 235c/s Ratio 1.9, LICA 50-69% mPSV 154c/s Ratio 1.2, Antegrade vertebrals  1/27/2021:CTA Carotids: GELACIO 80% RSCA 60% LICA 40%  3/5/2021 R TCAR  4/2021 Carotid Duplex: GELACIO 50-69% mPSV 139c/s Ratio 2.1, Antegrade vertebrals  7/2021 Carotid Duplex: GELACIO 0-49% mPSV 119c/s Ratio 1.3, LICA 0-49% mPSV 113c/s Ratio 0.6, Antegrade vertebrals  9/2022 Carotid Duplex: GELACIO 0-49% mPSV 121c/s Ratio 2.5, LICA 0-49% mPSV 85c/s Ratio 1.4, Antegrade vertebrals    The following portions of the patient's history were reviewed and updated as appropriate: allergies, current medications, past family history, past medical history, past social history, past surgical history and problem list.  Recent images independently reviewed.  Available laboratory values reviewed.    PMH:  Past Medical History:   Diagnosis Date   • Artificial lens present     right   • Astigmatism     myopic   • Atherosclerosis of native arteries of extremity with intermittent claudication (HCC)    • Benign essential hypertension    • Benign prostatic hyperplasia    • Cataract     R>L   • Corneal foreign body     Left eye, 2 years ago      • Essential hypertension    • Examination     individual health   • Exanthematous disorder    • Fracture of foot    • Hypercholesterolemia    • Hyperkalemia    • Hyperlipidemia    • Hypertensive disorder    • Male erectile disorder    • Need for vaccination    • Peripheral vascular disease (HCC)     Post RIGHT fem-pop bypass graft 1/7/13 Post thrombolysis to graft 12/15/14      • Peripheral vascular disease (HCC)     unspecified   • Posterior subcapsular polar senile cataract    • Surgical follow-up care     R fem->pop bypass 1/7/13      • Tobacco dependence syndrome    • Tobacco user    •  Urticaria     will give kenalog and benadyl , will order allery test      • Visual discomfort      Past Surgical History:   Procedure Laterality Date   • ANGIOPLASTY      femoral-popliteal artery (dilation) (Abdominal aortogram, bilateral lower extremity runoff. Repair of left common femoral artery.)   11/26/2012    • EYE SURGERY     • FEMORAL POPLITEAL BYPASS      Right with 6 mm [place]-baron graft)   01/07/2013    • INJECTION OF MEDICATION  03/04/2013    kenalog(3)    • OTHER SURGICAL HISTORY      Remove cataract, insert lens (Cataract extraction with intraocular lens implantation, right eye.)   11/26/2013    • VASCULAR SURGERY       Family History   Problem Relation Age of Onset   • Cancer Other    • Heart disease Other    • Hypertension Other    • Stroke Other      Social History     Tobacco Use   • Smoking status: Current Every Day Smoker     Packs/day: 1.50     Years: 30.00     Pack years: 45.00     Types: Cigarettes   • Smokeless tobacco: Never Used   Vaping Use   • Vaping Use: Never used   Substance Use Topics   • Alcohol use: Yes     Alcohol/week: 6.0 standard drinks     Types: 6 Cans of beer per week     Comment: daily   • Drug use: No       ALLERGIES:  Allergies   Allergen Reactions   • Lisinopril Angioedema         MEDICATIONS:    Current Outpatient Medications:   •  acetaminophen (TYLENOL) 325 MG tablet, Take 2 tablets by mouth Every 4 (Four) Hours As Needed for Mild Pain ., Disp: 60 tablet, Rfl: 0  •  aspirin 81 MG EC tablet, Take 1 tablet by mouth Daily., Disp: 30 tablet, Rfl: 1  •  atorvastatin (LIPITOR) 80 MG tablet, TAKE 1 TABLET BY MOUTH DAILY, Disp: 90 tablet, Rfl: 3  •  clopidogrel (PLAVIX) 75 MG tablet, TAKE 1 TABLET BY MOUTH DAILY, Disp: 90 tablet, Rfl: 3  •  dilTIAZem CD (CARDIZEM CD) 300 MG 24 hr capsule, Take 1 capsule by mouth Daily., Disp: 30 capsule, Rfl: 2  •  ferrous sulfate 324 MG tablet delayed-release, Take 1 tablet by mouth Daily With Breakfast., Disp: 30 tablet, Rfl: 5  •  hydrALAZINE  (APRESOLINE) 25 MG tablet, TAKE 1 TABLET BY MOUTH TWICE DAILY, Disp: 180 tablet, Rfl: 1  •  losartan (COZAAR) 25 MG tablet, Take 1 tablet by mouth Daily., Disp: 90 tablet, Rfl: 3  •  ondansetron (ZOFRAN) 4 MG tablet, Take 1 tablet by mouth Every 6 (Six) Hours As Needed for Nausea or Vomiting., Disp: 30 tablet, Rfl: 0  •  sennosides-docusate (senna-docusate sodium) 8.6-50 MG per tablet, Take 2 tablets by mouth 2 (Two) Times a Day As Needed for Constipation., Disp: , Rfl:   •  sodium bicarbonate 650 MG tablet, Take 1 tablet by mouth 2 (Two) Times a Day., Disp: , Rfl:   •  vitamin D (ERGOCALCIFEROL) 1.25 MG (03303 UT) capsule capsule, Take 50,000 Units by mouth 1 (One) Time Per Week., Disp: , Rfl:       Review of Systems   Constitutional: Negative for chills, decreased appetite, fever and weight loss.   HENT: Negative for congestion, nosebleeds and sore throat.    Eyes: Negative for blurred vision, visual disturbance and visual halos.   Cardiovascular: Negative for chest pain and leg swelling.   Respiratory: Negative for cough, shortness of breath, sputum production and wheezing.    Endocrine: Negative for cold intolerance and polyuria.   Hematologic/Lymphatic: Negative for bleeding problem. Bruises/bleeds easily.   Skin: Positive for unusual hair distribution. Negative for flushing and nail changes.   Musculoskeletal: Positive for arthritis, back pain and joint pain.   Gastrointestinal: Negative for bloating, abdominal pain, hematemesis, melena, nausea and vomiting.   Genitourinary: Negative for flank pain and hematuria.   Neurological: Negative for brief paralysis, difficulty with concentration, focal weakness, light-headedness, loss of balance, numbness, paresthesias and weakness.   Psychiatric/Behavioral: Negative for altered mental status, depression, substance abuse and suicidal ideas.   Allergic/Immunologic: Negative for hives and persistent infections.         Vitals:    09/14/22 1400   BP: 137/60   BP  "Location: Left arm   Patient Position: Sitting   Cuff Size: Adult   Pulse: 63   SpO2: 99%   Weight: 68.5 kg (151 lb)   Height: 182.9 cm (72\")     Physical Exam   Constitutional: He is oriented to person, place, and time. He appears well-developed.   HENT:   Head: Normocephalic and atraumatic.   Eyes: Pupils are equal, round, and reactive to light. Conjunctivae are normal.   Cardiovascular: Normal rate and normal pulses.   No murmur heard.  Pulmonary/Chest: Effort normal and breath sounds normal.   Abdominal: Soft. Bowel sounds are normal.   Musculoskeletal: Normal range of motion. No tenderness.   Neurological: He is alert and oriented to person, place, and time. No cranial nerve deficit.   Skin: Skin is warm and dry. Capillary refill takes less than 2 seconds.   There is no evidence of skin breakdown of the bilateral lower extremities.  BLE pink, no evidence of ischemia.  Feet are absent of dependent rubor.   Psychiatric: Judgment normal.   Nursing note and vitals reviewed.      Assessment & Plan     Independent Review of Studies  9/2022 Carotid Duplex: GELACIO 0-49% mPSV 121c/s Ratio 2.5, LICA 0-49% mPSV 85c/s Ratio 1.4, Antegrade vertebrals  9/2022 RANGEL: Right 0.95 triphasic.  Left 0.99 triphasic  9/2022  RIGHT Art US: Above knee fem-pop graft patent, mPSV 171cm/s distal graft      1. Carotid stenosis, asymptomatic, bilateral  No new deficits, s/p R TCAR  Duplex with stable velocities post procedure  ASA, Plavix, Statin    No reperfusion symptoms    Repeat duplex bilateral in 6 months    2. Tobacco use  Smoking cessation assistance options offered including behavioral counseling (Smoking Cessation Classes), Nicotine replacement therapy (patches or gum), pharmacologic therapy (Chantix, Wellbutrin). Understands tobacco increases risk of expanding AAA, MI, CVA, PAD, carcinoma. Discussion and question answer period 5-7 minutes.    3. Hyperlipidemia  Lipid-lowering therapy has been proven beneficial in patients with " cardio-vascular disease. Current guidelines recommend statin treatment for all patients with PAD,CAD and carotid stenosis. Statins are beneficial in preventing cardiovascular events, increasing functional capacity and lower the risk of adverse limb loss in PAD. Statins decrease the progression of plaque formation and may improve peripheral vessel lining, and aid in reversing atherosclerosis.    4. Peripheral vascular disease (HCC)  Repeat RANGEL, Right fem pop ultrasound next visit  DAPT, Statin    - Doppler Ankle Brachial Index Single Level CAR; Future  - Duplex Lower Extremity Art / Grafts - Left CAR; Future                This document has been electronically signed by SCOUT Fuentes on September 16, 2022 16:03 CDT

## 2022-10-15 NOTE — PROGRESS NOTES
Subjective:     Dwight Ryder is a 66 y.o. male who presents for 3 month follow-up of HTN.    New concerns: None.    Evaluation:   Blood pressure reading not indicated for medication reasons: No   Blood pressure reading refused by patient: No   Home blood pressure readings:  Normotensive, systolic 120s to 140s, diastolic 70s and 80s.  He checks about once a week presently.   Blood pressure often elevated in physician office: Sometimes   Onset of symptoms: N/A .     Symptoms:   Headache: no   Visual disturbance: yes, chronic, has seen Dr Ray in past for senile cataracts   Fatigue: no   Dyspnea: no   Orthopnea: no   Edema: no   Chest pain: no   Palpitations: no   Diaphoresis: no    Risk Factors:   Tobacco use, HLD, FHx: CAD, FHx: HTN    Comorbid Conditions:   PVD    The following portions of the patient's history were reviewed and updated as appropriate: allergies, current medications, past family history, past medical history, past social history, past surgical history and problem list.    Preventative:  Over the past 2 weeks, have you felt down, depressed, or hopeless?No   Over the past 2 weeks, have you felt little interest or pleasure in doing things?No  Clinical depression screening refused by patient.No     On osteoporosis therapy?No     Past Medical Hx:  Past Medical History   Diagnosis Date   • Artificial lens present      right   • Astigmatism      myopic   • Atherosclerosis of native arteries of extremity with intermittent claudication    • Benign essential hypertension    • Benign prostatic hyperplasia    • Cataract      R>L   • Corneal foreign body      Left eye, 2 years ago      • Essential hypertension    • Examination      individual health   • Exanthematous disorder    • Fracture of foot    • Hypercholesterolemia    • Hyperkalemia    • Hyperlipidemia    • Hypertensive disorder    • Male erectile disorder    • Need for vaccination    • Peripheral vascular disease      Post RIGHT fem-pop  bypass graft 1/7/13 Post thrombolysis to graft 12/15/14      • Peripheral vascular disease      unspecified   • Posterior subcapsular polar senile cataract    • Surgical follow-up care      R fem->pop bypass 1/7/13      • Tobacco dependence syndrome    • Tobacco user    • Urticaria      will give kenalog and benadyl , will order allery test      • Visual discomfort        Past Surgical Hx:  Past Surgical History   Procedure Laterality Date   • Angioplasty       femoral-popliteal artery (dilation) (Abdominal aortogram, bilateral lower extremity runoff. Repair of left common femoral artery.)   11/26/2012    • Femoral popliteal bypass       Right with 6 mm [place]-baron graft)   01/07/2013    • Injection of medication  03/04/2013     kenalog(3)    • Other surgical history       Remove cataract, insert lens (Cataract extraction with intraocular lens implantation, right eye.)   11/26/2013        Health Maintenance:  Health Maintenance   Topic Date Due   • HEPATITIS C SCREENING  08/31/2016   • LIPID PANEL  09/13/2017   • COLONOSCOPY  09/14/2026   • TDAP/TD VACCINES (2 - Td) 09/14/2026   • PNEUMOCOCCAL VACCINE (19-64 MEDIUM RISK)  Completed   • INFLUENZA VACCINE  Addressed   • ZOSTER VACCINE  Addressed       Current Meds:    Current Outpatient Prescriptions:   •  aspirin 81 MG tablet, Take 1 tablet by mouth Daily., Disp: 30 tablet, Rfl: 3  •  atorvastatin (LIPITOR) 80 MG tablet, Take 1 tablet by mouth Daily., Disp: 30 tablet, Rfl: 3  •  clopidogrel (PLAVIX) 75 MG tablet, Take 1 tablet by mouth Daily., Disp: 30 tablet, Rfl: 3  •  diltiazem CD (CARDIZEM CD) 240 MG 24 hr capsule, take 1 capsule by mouth once daily, Disp: 30 capsule, Rfl: 11  •  hydrALAZINE (APRESOLINE) 25 MG tablet, Take 1 tablet by mouth 3 (Three) Times a Day., Disp: 90 tablet, Rfl: 3  •  terbinafine (lamiSIL) 250 MG tablet, Take 1 tablet by mouth Daily., Disp: 84 tablet, Rfl: 0    Allergies:  Lisinopril    Family Hx:  Family History   Problem Relation Age of  "Onset   • Cancer Other    • Heart disease Other    • Hypertension Other    • Stroke Other         Social History:  Social History     Social History   • Marital status:      Spouse name: N/A   • Number of children: N/A   • Years of education: N/A     Occupational History   • Not on file.     Social History Main Topics   • Smoking status: Current Every Day Smoker     Types: Cigarettes   • Smokeless tobacco: Not on file      Comment: 1-1.5 PPD FOR 45 YEARS   • Alcohol use Not on file   • Drug use: Not on file   • Sexual activity: Not on file     Other Topics Concern   • Not on file     Social History Narrative       Review of Systems  Review of Systems  Hypertension ROS: taking medications as instructed, no medication side effects noted, no TIA's, no chest pain on exertion, no dyspnea on exertion and no swelling of ankles  General ROS: negative  Psychological ROS: negative  Ophthalmic ROS: negative  ENT ROS: negative  Allergy and Immunology ROS: negative  Hematological and Lymphatic ROS: negative  Endocrine ROS: negative  Respiratory ROS: no cough, shortness of breath, or wheezing  Cardiovascular ROS: negative  Gastrointestinal ROS: no abdominal pain, change in bowel habits, or black or bloody stools  Genito-Urinary ROS: no dysuria, trouble voiding, or hematuria  Musculoskeletal ROS: negative    Objective:     Visit Vitals   • /79 (BP Location: Left arm, Patient Position: Sitting)   • Pulse 75   • Ht 72\" (182.9 cm)   • Wt 140 lb 9.6 oz (63.8 kg)   • SpO2 96%   • BMI 19.07 kg/m2       General:  alert, appears stated age and cooperative   Oropharynx: lips, mucosa, and tongue normal; teeth and gums normal    Eyes:  conjunctivae/corneas clear. PERRL, EOM's intact. Fundi benign.    Ears:  normal TM's and external ear canals both ears   Neck: no adenopathy, no carotid bruit, no JVD, supple, symmetrical, trachea midline and thyroid not enlarged, symmetric, no tenderness/mass/nodules   Thyroid:  no palpable " nodule   Lung: clear to auscultation bilaterally   Heart:  regular rate and rhythm, S1, S2 normal, no murmur, click, rub or gallop   Abdomen: soft, non-tender; bowel sounds normal; no masses,  no organomegaly   Extremities: extremities normal, atraumatic, no cyanosis or edema   Skin:  Onchymycosis, both big toes.        Pulses: 2+ and symmetric   Neuro: normal without focal findings, mental status, speech normal, alert and oriented x3 and reflexes normal and symmetric       Lab Review   Assessment:     Hypertension improved, needs to quit smoking and needs to follow diet more regularly.  1. Essential hypertension    2. Screening    3. Peripheral vascular disease    4. Onychomycosis         Plan:   HTN  1.  Rx changes: none. He states compliance.  Refilled requested medications.  2.  Education:    EKG ordered: no    Findings: N/A   Presented an overview of HTN, expected course, considerations, risk factors, and exacerbation prevention.   Discussed treatment options for HTN: yes   Recommended restricted dietary Na intake: yes   Recommended increased in dietary K intake: yes   Discussed patient action plan for HTN: yes  3.  Compliance at present is estimated to be fair. Efforts to improve compliance (if necessary) will be directed at dietary modifications: DASH diet (counseled) and increased exercise.  Recommended more frequent bp monitoring, such as every day or every other day at least.  4.  Follow up: 3 months    PVD, s/p R fem-pop 2013--Denies claudication, peripheral pulses normal  -last saw Dr Robison 11/22/16, who recommended f/u in 6 months  -Extensive smoking cessation counseling (3-10 min).  Pt aware of risks of continued smoking, including MI, CVA, worsened PVD, cancer.    Onychomycosis  -Will start treatment with Lamisil  -Check CBC and CMP today.  Recheck CMP in 4-6 weeks.    GOALS:  Control bp  BARRIERS TO GOALS:  Insight    Preventative:  Male Preventative: Colon cancer screening is not up to date.     delayed   Recommended:Td, Varicella and Influenza, refused. Also refused colonoscopy/FIT.  Hep C today.  Smoking cessation counseling was provided.   Pre-comtemplative, declined medications, patches, gum.  Also discussed that he meets criteria for low-dose CT for lung cancer screening, and advanced lung cancer has a very high morbidity and mortality; still he declined low-dose CT.  does not drink  decrease soda or juice intake, increase water intake, increase physical activity, reduce portion size, cut out extra servings, reduce fast food intake and have 3 meals a day    RISK SCORE: 4           This document has been electronically signed by Ganga Acuña MD on January 12, 2017 10:34 AM     STRICT/yes

## 2022-11-02 DIAGNOSIS — I73.9 PERIPHERAL VASCULAR DISEASE: ICD-10-CM

## 2022-11-02 RX ORDER — ATORVASTATIN CALCIUM 80 MG/1
80 TABLET, FILM COATED ORAL DAILY
Qty: 90 TABLET | Refills: 3 | Status: SHIPPED | OUTPATIENT
Start: 2022-11-02 | End: 2023-02-22 | Stop reason: SDUPTHER

## 2022-11-07 DIAGNOSIS — I10 ESSENTIAL HYPERTENSION: ICD-10-CM

## 2022-11-07 RX ORDER — HYDRALAZINE HYDROCHLORIDE 25 MG/1
TABLET, FILM COATED ORAL
Qty: 180 TABLET | Refills: 1 | Status: SHIPPED | OUTPATIENT
Start: 2022-11-07 | End: 2023-03-15

## 2022-11-19 DIAGNOSIS — I10 ESSENTIAL HYPERTENSION: ICD-10-CM

## 2022-11-21 RX ORDER — DILTIAZEM HYDROCHLORIDE 300 MG/1
300 CAPSULE, COATED, EXTENDED RELEASE ORAL DAILY
Qty: 30 CAPSULE | Refills: 2 | Status: SHIPPED | OUTPATIENT
Start: 2022-11-21 | End: 2023-02-14 | Stop reason: SDUPTHER

## 2022-12-02 DIAGNOSIS — I73.9 PERIPHERAL VASCULAR DISEASE: ICD-10-CM

## 2022-12-02 RX ORDER — CLOPIDOGREL BISULFATE 75 MG/1
75 TABLET ORAL DAILY
Qty: 90 TABLET | Refills: 3 | Status: SHIPPED | OUTPATIENT
Start: 2022-12-02

## 2022-12-05 ENCOUNTER — LAB (OUTPATIENT)
Dept: LAB | Facility: HOSPITAL | Age: 72
End: 2022-12-05

## 2022-12-05 ENCOUNTER — TRANSCRIBE ORDERS (OUTPATIENT)
Dept: LAB | Facility: HOSPITAL | Age: 72
End: 2022-12-05

## 2022-12-05 DIAGNOSIS — N18.9 ANEMIA OF CHRONIC RENAL FAILURE, UNSPECIFIED CKD STAGE: Primary | ICD-10-CM

## 2022-12-05 DIAGNOSIS — N18.9 ANEMIA OF CHRONIC RENAL FAILURE, UNSPECIFIED CKD STAGE: ICD-10-CM

## 2022-12-05 DIAGNOSIS — I12.0 PARENCHYMAL RENAL HYPERTENSION, STAGE 5 CHRONIC KIDNEY DISEASE OR END STAGE RENAL DISEASE: ICD-10-CM

## 2022-12-05 DIAGNOSIS — E87.20 ACIDOSIS: ICD-10-CM

## 2022-12-05 DIAGNOSIS — D63.1 ANEMIA OF CHRONIC RENAL FAILURE, UNSPECIFIED CKD STAGE: ICD-10-CM

## 2022-12-05 DIAGNOSIS — D63.1 ANEMIA OF CHRONIC RENAL FAILURE, UNSPECIFIED CKD STAGE: Primary | ICD-10-CM

## 2022-12-05 LAB — PTH-INTACT SERPL-MCNC: 41.9 PG/ML (ref 15–65)

## 2022-12-05 PROCEDURE — 36415 COLL VENOUS BLD VENIPUNCTURE: CPT

## 2022-12-05 PROCEDURE — 85025 COMPLETE CBC W/AUTO DIFF WBC: CPT

## 2022-12-05 PROCEDURE — 84550 ASSAY OF BLOOD/URIC ACID: CPT

## 2022-12-05 PROCEDURE — 84156 ASSAY OF PROTEIN URINE: CPT

## 2022-12-05 PROCEDURE — 82306 VITAMIN D 25 HYDROXY: CPT

## 2022-12-05 PROCEDURE — 80069 RENAL FUNCTION PANEL: CPT

## 2022-12-05 PROCEDURE — 83970 ASSAY OF PARATHORMONE: CPT

## 2022-12-05 PROCEDURE — 82570 ASSAY OF URINE CREATININE: CPT

## 2022-12-06 LAB
25(OH)D3 SERPL-MCNC: 40.7 NG/ML (ref 30–100)
ALBUMIN SERPL-MCNC: 4.5 G/DL (ref 3.5–5.2)
ANION GAP SERPL CALCULATED.3IONS-SCNC: 12.2 MMOL/L (ref 5–15)
BASOPHILS # BLD AUTO: 0.02 10*3/MM3 (ref 0–0.2)
BASOPHILS NFR BLD AUTO: 0.6 % (ref 0–1.5)
BUN SERPL-MCNC: 12 MG/DL (ref 8–23)
BUN/CREAT SERPL: 9.1 (ref 7–25)
CALCIUM SPEC-SCNC: 9.5 MG/DL (ref 8.6–10.5)
CHLORIDE SERPL-SCNC: 98 MMOL/L (ref 98–107)
CO2 SERPL-SCNC: 21.8 MMOL/L (ref 22–29)
CREAT SERPL-MCNC: 1.32 MG/DL (ref 0.76–1.27)
CREAT UR-MCNC: 139.9 MG/DL
DEPRECATED RDW RBC AUTO: 39.4 FL (ref 37–54)
EGFRCR SERPLBLD CKD-EPI 2021: 58 ML/MIN/1.73
EOSINOPHIL # BLD AUTO: 0.04 10*3/MM3 (ref 0–0.4)
EOSINOPHIL NFR BLD AUTO: 1.3 % (ref 0.3–6.2)
ERYTHROCYTE [DISTWIDTH] IN BLOOD BY AUTOMATED COUNT: 13 % (ref 12.3–15.4)
GLUCOSE SERPL-MCNC: 124 MG/DL (ref 65–99)
HCT VFR BLD AUTO: 37.5 % (ref 37.5–51)
HGB BLD-MCNC: 13.4 G/DL (ref 13–17.7)
IMM GRANULOCYTES # BLD AUTO: 0.01 10*3/MM3 (ref 0–0.05)
IMM GRANULOCYTES NFR BLD AUTO: 0.3 % (ref 0–0.5)
LYMPHOCYTES # BLD AUTO: 0.86 10*3/MM3 (ref 0.7–3.1)
LYMPHOCYTES NFR BLD AUTO: 27.7 % (ref 19.6–45.3)
MCH RBC QN AUTO: 30.1 PG (ref 26.6–33)
MCHC RBC AUTO-ENTMCNC: 35.7 G/DL (ref 31.5–35.7)
MCV RBC AUTO: 84.3 FL (ref 79–97)
MONOCYTES # BLD AUTO: 0.44 10*3/MM3 (ref 0.1–0.9)
MONOCYTES NFR BLD AUTO: 14.2 % (ref 5–12)
NEUTROPHILS NFR BLD AUTO: 1.73 10*3/MM3 (ref 1.7–7)
NEUTROPHILS NFR BLD AUTO: 55.9 % (ref 42.7–76)
NRBC BLD AUTO-RTO: 0 /100 WBC (ref 0–0.2)
PHOSPHATE SERPL-MCNC: 3.2 MG/DL (ref 2.5–4.5)
PLATELET # BLD AUTO: 284 10*3/MM3 (ref 140–450)
PMV BLD AUTO: 12.2 FL (ref 6–12)
POTASSIUM SERPL-SCNC: 4 MMOL/L (ref 3.5–5.2)
PROT ?TM UR-MCNC: 16.5 MG/DL
PROT/CREAT UR: 117.9 MG/G CREA (ref 0–200)
RBC # BLD AUTO: 4.45 10*6/MM3 (ref 4.14–5.8)
SODIUM SERPL-SCNC: 132 MMOL/L (ref 136–145)
URATE SERPL-MCNC: 6.8 MG/DL (ref 3.4–7)
WBC NRBC COR # BLD: 3.1 10*3/MM3 (ref 3.4–10.8)

## 2023-01-23 ENCOUNTER — LAB (OUTPATIENT)
Dept: LAB | Facility: HOSPITAL | Age: 73
End: 2023-01-23
Payer: MEDICARE

## 2023-01-23 ENCOUNTER — OFFICE VISIT (OUTPATIENT)
Dept: FAMILY MEDICINE CLINIC | Facility: CLINIC | Age: 73
End: 2023-01-23
Payer: COMMERCIAL

## 2023-01-23 VITALS
HEIGHT: 72 IN | DIASTOLIC BLOOD PRESSURE: 60 MMHG | OXYGEN SATURATION: 99 % | SYSTOLIC BLOOD PRESSURE: 122 MMHG | WEIGHT: 152.4 LBS | TEMPERATURE: 96.4 F | HEART RATE: 78 BPM | BODY MASS INDEX: 20.64 KG/M2

## 2023-01-23 DIAGNOSIS — N18.31 STAGE 3A CHRONIC KIDNEY DISEASE: ICD-10-CM

## 2023-01-23 DIAGNOSIS — Z72.0 TOBACCO USE: ICD-10-CM

## 2023-01-23 DIAGNOSIS — I10 ESSENTIAL HYPERTENSION: ICD-10-CM

## 2023-01-23 DIAGNOSIS — E78.2 MIXED HYPERLIPIDEMIA: ICD-10-CM

## 2023-01-23 DIAGNOSIS — E78.2 MIXED HYPERLIPIDEMIA: Primary | ICD-10-CM

## 2023-01-23 PROCEDURE — 80061 LIPID PANEL: CPT

## 2023-01-23 PROCEDURE — 99213 OFFICE O/P EST LOW 20 MIN: CPT

## 2023-01-23 PROCEDURE — 36415 COLL VENOUS BLD VENIPUNCTURE: CPT

## 2023-01-24 LAB
CHOLEST SERPL-MCNC: 136 MG/DL (ref 0–200)
HDLC SERPL-MCNC: 84 MG/DL (ref 40–60)
LDLC SERPL CALC-MCNC: 43 MG/DL (ref 0–100)
LDLC/HDLC SERPL: 0.54 {RATIO}
TRIGL SERPL-MCNC: 34 MG/DL (ref 0–150)
VLDLC SERPL-MCNC: 9 MG/DL (ref 5–40)

## 2023-01-27 NOTE — PROGRESS NOTES
Family Medicine Residency  Sparkle Miranda MD    Subjective:     Dwight Ryder is a 72 y.o. male who presents for hypertension follow up. Patient states he is doing well and has no complaints today. He checks his blood pressure at home and states his systolic readings usually run 130-140. He endorses he is compliant with his medications and takes them daily. I discussed with him that he is overdue for certain care gaps such as a lung cancer screening with low dose CT scan and a lipid panel. He has not seen his nephrologist in a year, though he was instructed to return in 6 months. He denies any changes in urination, back pain, hematuria. He denies chest pain or shortness of breath.    The following portions of the patient's history were reviewed and updated as appropriate: allergies, current medications, past family history, past medical history, past social history, past surgical history and problem list.    Past Medical Hx:  Past Medical History:   Diagnosis Date   • Artificial lens present     right   • Astigmatism     myopic   • Atherosclerosis of native arteries of extremity with intermittent claudication (HCC)    • Benign essential hypertension    • Benign prostatic hyperplasia    • Cataract     R>L   • Corneal foreign body     Left eye, 2 years ago      • Essential hypertension    • Examination     individual health   • Exanthematous disorder    • Fracture of foot    • Hypercholesterolemia    • Hyperkalemia    • Hyperlipidemia    • Hypertensive disorder    • Male erectile disorder    • Need for vaccination    • Peripheral vascular disease (HCC)     Post RIGHT fem-pop bypass graft 1/7/13 Post thrombolysis to graft 12/15/14      • Peripheral vascular disease (HCC)     unspecified   • Posterior subcapsular polar senile cataract    • Surgical follow-up care     R fem->pop bypass 1/7/13      • Tobacco dependence syndrome    • Tobacco user    • Urticaria     will give kenalog and benadyl , will order  allery test      • Visual discomfort        Past Surgical Hx:  Past Surgical History:   Procedure Laterality Date   • ANGIOPLASTY      femoral-popliteal artery (dilation) (Abdominal aortogram, bilateral lower extremity runoff. Repair of left common femoral artery.)   11/26/2012    • EYE SURGERY     • FEMORAL POPLITEAL BYPASS      Right with 6 mm [place]-baron graft)   01/07/2013    • INJECTION OF MEDICATION  03/04/2013    kenalog(3)    • OTHER SURGICAL HISTORY      Remove cataract, insert lens (Cataract extraction with intraocular lens implantation, right eye.)   11/26/2013    • VASCULAR SURGERY         Current Meds:    Current Outpatient Medications:   •  acetaminophen (TYLENOL) 325 MG tablet, Take 2 tablets by mouth Every 4 (Four) Hours As Needed for Mild Pain ., Disp: 60 tablet, Rfl: 0  •  aspirin 81 MG EC tablet, Take 1 tablet by mouth Daily., Disp: 30 tablet, Rfl: 1  •  atorvastatin (LIPITOR) 80 MG tablet, TAKE 1 TABLET BY MOUTH DAILY, Disp: 90 tablet, Rfl: 3  •  clopidogrel (PLAVIX) 75 MG tablet, TAKE 1 TABLET BY MOUTH DAILY, Disp: 90 tablet, Rfl: 3  •  dilTIAZem CD (CARDIZEM CD) 300 MG 24 hr capsule, TAKE 1 CAPSULE BY MOUTH DAILY, Disp: 30 capsule, Rfl: 2  •  hydrALAZINE (APRESOLINE) 25 MG tablet, TAKE 1 TABLET BY MOUTH TWICE DAILY, Disp: 180 tablet, Rfl: 1  •  losartan (COZAAR) 25 MG tablet, Take 1 tablet by mouth Daily., Disp: 90 tablet, Rfl: 3  •  sodium bicarbonate 650 MG tablet, Take 1 tablet by mouth 2 (Two) Times a Day., Disp: , Rfl:   •  ferrous sulfate 324 MG tablet delayed-release, Take 1 tablet by mouth Daily With Breakfast., Disp: 30 tablet, Rfl: 5  •  ondansetron (ZOFRAN) 4 MG tablet, Take 1 tablet by mouth Every 6 (Six) Hours As Needed for Nausea or Vomiting., Disp: 30 tablet, Rfl: 0  •  sennosides-docusate (senna-docusate sodium) 8.6-50 MG per tablet, Take 2 tablets by mouth 2 (Two) Times a Day As Needed for Constipation., Disp: , Rfl:   •  vitamin D (ERGOCALCIFEROL) 1.25 MG (72685 UT) capsule  "capsule, Take 50,000 Units by mouth 1 (One) Time Per Week., Disp: , Rfl:     Allergies:  Allergies   Allergen Reactions   • Lisinopril Angioedema       Family Hx:  Family History   Problem Relation Age of Onset   • Cancer Other    • Heart disease Other    • Hypertension Other    • Stroke Other         Social History:  Social History     Socioeconomic History   • Marital status: Single   Tobacco Use   • Smoking status: Every Day     Packs/day: 1.50     Years: 30.00     Pack years: 45.00     Types: Cigarettes   • Smokeless tobacco: Never   Vaping Use   • Vaping Use: Never used   Substance and Sexual Activity   • Alcohol use: Yes     Alcohol/week: 6.0 standard drinks     Types: 6 Cans of beer per week     Comment: daily   • Drug use: No   • Sexual activity: Defer       Review of Systems  Review of Systems   Constitutional: Negative.    HENT: Negative.    Eyes: Negative.    Respiratory: Negative.    Cardiovascular: Negative.    Gastrointestinal: Negative.    Genitourinary: Negative.    Musculoskeletal: Positive for arthralgias.   Skin: Negative.    Neurological: Negative.    Psychiatric/Behavioral: Negative.        Objective:     /60   Pulse 78   Temp 96.4 °F (35.8 °C)   Ht 182.9 cm (72\")   Wt 69.1 kg (152 lb 6.4 oz)   SpO2 99%   BMI 20.67 kg/m²   Physical Exam  Constitutional:       Appearance: Normal appearance.   Eyes:      Extraocular Movements: Extraocular movements intact.      Conjunctiva/sclera: Conjunctivae normal.   Cardiovascular:      Rate and Rhythm: Normal rate and regular rhythm.      Pulses: Normal pulses.      Heart sounds: Normal heart sounds.   Pulmonary:      Effort: Pulmonary effort is normal.      Breath sounds: Normal breath sounds.   Abdominal:      General: Abdomen is flat. Bowel sounds are normal.      Palpations: Abdomen is soft.   Musculoskeletal:      Right lower leg: No edema.      Left lower leg: No edema.   Skin:     General: Skin is warm.   Neurological:      Mental Status: " He is alert and oriented to person, place, and time.   Psychiatric:         Mood and Affect: Mood normal.         Behavior: Behavior normal.         Thought Content: Thought content normal.         Judgment: Judgment normal.          Assessment/Plan:     Diagnoses and all orders for this visit:    1. Mixed hyperlipidemia (Primary)  -     Lipid panel; Future    2. Essential hypertension    3. Stage 3a chronic kidney disease (HCC)    4. Tobacco use    Plan:   - Continue current HTN regimen. Patient tolerating medications well and home BP readings are within range.  - Discussed with patient importance, risks, and benefits of Low dose CT scan for lung cancer screening. He is not interested at this time. He continues to smoke 1-1.5 packs of cigarettes a day. Patient is not interested in quitting smoking at this time either. Resources were identified and provided.   - Lipid panel ordered; Results are WNL. Patient is currently on statin and should continue.  - Discussed with patient that a follow up with nephrology is warranted due to last appointment being 1 year ago. Discussed importance of keeping track of kidney function and importance of slowing decline.   - Depression screening is due next visit.   - Fill care gaps regarding vaccinations     · Rx changes: See A/P  · Patient Education: See A/P  · Compliance at present is estimated to be satisfactory.       Follow-up:     Return in about 6 months (around 7/23/2023).    Preventative:  Health Maintenance   Topic Date Due   • ZOSTER VACCINE (2 of 2) 11/09/2016   • ANNUAL WELLNESS VISIT  08/13/2021   • COVID-19 Vaccine (5 - Booster for Pfizer series) 07/20/2022   • INFLUENZA VACCINE  08/01/2022   • Pneumococcal Vaccine 65+ (2 - PCV) 05/19/2023 (Originally 10/18/2014)   • LUNG CANCER SCREENING  01/27/2024 (Originally 10/23/2002)   • LIPID PANEL  01/23/2024   • TDAP/TD VACCINES (2 - Td or Tdap) 09/14/2026   • COLORECTAL CANCER SCREENING  09/14/2026   • HEPATITIS C SCREENING   Completed   • AAA SCREEN (ONE-TIME)  Completed       Alcohol use:  reports current alcohol use of about 6.0 standard drinks per week.  Nicotine status  reports that he has been smoking cigarettes. He has a 45.00 pack-year smoking history. He has never used smokeless tobacco.     Goals     •  cut back on smoking to 1/2 ppd (pt-stated)       Barrier:  Dependence            RISK SCORE: 3        This document has been electronically signed by Sparkle Miranda MD on January 27, 2023 13:11 CST

## 2023-02-01 DIAGNOSIS — I73.9 PERIPHERAL VASCULAR DISEASE: Primary | ICD-10-CM

## 2023-02-01 DIAGNOSIS — I65.23 BILATERAL CAROTID ARTERY STENOSIS: ICD-10-CM

## 2023-02-06 NOTE — PROGRESS NOTES
I have reviewed the notes, assessments, and/or procedures performed by Sparkle Miranda MD pduring office visit. I concur with her/his documentation and assessment and plan for Dwight Ryder.          This document has been electronically signed by Stephanie Mai MD on February 6, 2023 09:12 CST

## 2023-02-14 DIAGNOSIS — I10 ESSENTIAL HYPERTENSION: ICD-10-CM

## 2023-02-14 RX ORDER — DILTIAZEM HYDROCHLORIDE 300 MG/1
300 CAPSULE, COATED, EXTENDED RELEASE ORAL DAILY
Qty: 30 CAPSULE | Refills: 2 | Status: SHIPPED | OUTPATIENT
Start: 2023-02-14

## 2023-02-22 DIAGNOSIS — I73.9 PERIPHERAL VASCULAR DISEASE: ICD-10-CM

## 2023-02-22 RX ORDER — ATORVASTATIN CALCIUM 80 MG/1
80 TABLET, FILM COATED ORAL DAILY
Qty: 90 TABLET | Refills: 3 | Status: SHIPPED | OUTPATIENT
Start: 2023-02-22

## 2023-03-15 ENCOUNTER — OFFICE VISIT (OUTPATIENT)
Dept: CARDIAC SURGERY | Facility: CLINIC | Age: 73
End: 2023-03-15
Payer: COMMERCIAL

## 2023-03-15 VITALS
HEART RATE: 76 BPM | HEIGHT: 72 IN | SYSTOLIC BLOOD PRESSURE: 142 MMHG | DIASTOLIC BLOOD PRESSURE: 70 MMHG | BODY MASS INDEX: 20.99 KG/M2 | WEIGHT: 155 LBS | OXYGEN SATURATION: 98 %

## 2023-03-15 DIAGNOSIS — I73.9 PERIPHERAL VASCULAR DISEASE: Primary | ICD-10-CM

## 2023-03-15 PROCEDURE — 99214 OFFICE O/P EST MOD 30 MIN: CPT | Performed by: NURSE PRACTITIONER

## 2023-03-15 NOTE — PATIENT INSTRUCTIONS
The results of your vascular studies of your right leg shows mild disease with good  circulation, in the left leg shows milddisease with good  circulation.      If signs and symptoms of ischemia should occur including but not limited to pale/blue discoloration of limb, increasing pain with ambulation or at rest, or a non-healing wound. Patient is to notify [facility] center for immediate evaluation.    The results of your carotid ultrasound shows mild disease of the right carotid and is stable from previous exam, ultrasound of the left carotid shows mild disease and is stable from previous exam.    Follow up in 6 months    If you should experience any neurological symptoms including but not limited to visual or speech disturbances confusion, seizures, or weakness of limbs of one side of your body notify [facility] center immediately for evaluation or if after hours present to the nearest Emergency Department.     Return in 6 months [facility] and graft ultrasound

## 2023-03-16 DIAGNOSIS — I65.23 CAROTID STENOSIS, ASYMPTOMATIC, BILATERAL: Primary | ICD-10-CM

## 2023-03-16 RX ORDER — LOSARTAN POTASSIUM 25 MG/1
25 TABLET ORAL DAILY
Qty: 90 TABLET | Refills: 3 | Status: SHIPPED | OUTPATIENT
Start: 2023-03-16

## 2023-03-16 NOTE — PROGRESS NOTES
CVTS Office Progress Note     Dwight Ryder  1950    Chief Complaint:    Chief Complaint   Patient presents with   • Carotid Artery Disease   • Peripheral Vascular Disease       HPI:      PCP:  Sparkle Miranda MD    72 y.o. male with HTN(stable, increased risk stroke, rupture), Hyperlipidemia(stable, increased risk cardiovascular events) and Smoker(uncontrolled, increased risk cardiovascular events) BPH, PVD (stable, increased risk cardiovascular events).  smokes 1 PPD.  Established with [facility]VS in 2013 with lifestyle limiting claudication, prior R SFA stent occluded.  Underwent bypass which occluded the following year requiring lysis.  Unfortunately he continues to smoke.  No complaints of claudication at today's visit.  No other associated signs, symptoms or modifying factors.  Underwent R TCAR, no new deficits, did well post operatively.  No recent hospitalizations or issues returns today in follow up.     2012 RIGHT SFA stent  2013 RIGHT femoral to popliteal artery bypass (PTFE)  12/15/2014 RIGHT lower extremity angiogram:  RIGHT fem-pop thrombolysis, EKOS.  12/2020 Restudy: Graft patent  11/22/2016 [facility]ANGEL:  RIGHT 1.1 triphasic.  LEFT 1.1 triphasic.  Graft 169cm/s, triphasic.  5/22/2017 [facility]EL:  RIGHT 1.1 triphasic.  LEFT 1.0 triphasic.  Graft 186cm/s, triphasic.  12/06/17   RANGEL:  RIGHT 1.12  Triphasic.  LEFT 0.99  Triphasic.  Graft Patent 237 cm/s, triphasic.  06/07/18  RANGEL:  RIGHT 0.99  Triphasic.  LEFT 0.98  Triphasic.   12/18/18  RANGEL: Right 1.04 Fem triphasic Poptriphasic DP triphasic PT triphasic, Left 0.95 Fem triphasic Poptriphasic DP triphasic PT triphasic  12/18/18  U/S Graft Survey: RT PTFE bypass patent mPSV 236 at proximal anastomosis   7/16/19 RANGEL: Right 1.07 triphasic.  Left 0.99 triphasic.  7/2020 [facility]ANGEL: Right 1.0 triphasic.  Left 1.05 triphasic.   7/2020 Right Graft US: Patent, [id number] proximal anastamosis  1/2021 RANGEL: Right 0.9 triphasic.  Left 0.94 triphasic.   9/2022 [facility]ANGEL: Right  0.95 triphasic.  Left 0.99 triphasic  9/2022  RIGHT Art US: Above knee fem-pop graft patent, mPSV 171cm/s distal graft  3/2023 RANGEL: Right 0.77 triphasic popliteal mixed distally.  Left 0.9 triphasic    7/2020 Carotid Duplex: GELACIO 50-69% mPSV 133c/s Ratio 1.3, LICA 50-69% mPSV 139c/s Ratio 0.8, Antegrade vertebrals  1/2021 Carotid Duplex: GELACIO >70% mPSV 235c/s Ratio 1.9, LICA 50-69% mPSV 154c/s Ratio 1.2, Antegrade vertebrals  1/27/2021:CTA Carotids: GELACIO 80% RSCA 60% LICA 40%  3/5/2021 R TCAR  4/2021 Carotid Duplex: GELACIO 50-69% mPSV 139c/s Ratio 2.1, Antegrade vertebrals  7/2021 Carotid Duplex: GELACIO 0-49% mPSV 119c/s Ratio 1.3, LICA 0-49% mPSV 113c/s Ratio 0.6, Antegrade vertebrals  9/2022 Carotid Duplex: GELACIO 0-49% mPSV 121c/s Ratio 2.5, LICA 0-49% mPSV 85c/s Ratio 1.4, Antegrade vertebrals  3/2023 Carotid Duplex: GELACIO 50-69% mPSV 131c/s mEDV 34c/s Ratio 1.9, LICA 0-49% mPSV 92c/s mEDV 20c/s Ratio 1.0, Antegrade vertebrals      The following portions of the patient's history were reviewed and updated as appropriate: allergies, current medications, past family history, past medical history, past social history, past surgical history and problem list.  Recent images independently reviewed.  Available laboratory values reviewed.    PMH:  Past Medical History:   Diagnosis Date   • Artificial lens present     right   • Astigmatism     myopic   • Atherosclerosis of native arteries of extremity with intermittent claudication (HCC)    • Benign essential hypertension    • Benign prostatic hyperplasia    • Cataract     R>L   • Corneal foreign body     Left eye, 2 years ago      • Essential hypertension    • Examination     individual health   • Exanthematous disorder    • Fracture of foot    • Hypercholesterolemia    • Hyperkalemia    • Hyperlipidemia    • Hypertensive disorder    • Male erectile disorder    • Need for vaccination    • Peripheral vascular disease (HCC)     Post RIGHT fem-pop bypass graft 1/7/13 Post  thrombolysis to graft 12/15/14      • Peripheral vascular disease (HCC)     unspecified   • Posterior subcapsular polar senile cataract    • Surgical follow-up care     R fem->pop bypass 1/7/13      • Tobacco dependence syndrome    • Tobacco user    • Urticaria     will give kenalog and benadyl , will order allery test      • Visual discomfort      Past Surgical History:   Procedure Laterality Date   • ANGIOPLASTY      femoral-popliteal artery (dilation) (Abdominal aortogram, bilateral lower extremity runoff. Repair of left common femoral artery.)   11/26/2012    • EYE SURGERY     • FEMORAL POPLITEAL BYPASS      Right with 6 mm [place]-baron graft)   01/07/2013    • INJECTION OF MEDICATION  03/04/2013    kenalog(3)    • OTHER SURGICAL HISTORY      Remove cataract, insert lens (Cataract extraction with intraocular lens implantation, right eye.)   11/26/2013    • VASCULAR SURGERY       Family History   Problem Relation Age of Onset   • Cancer Other    • Heart disease Other    • Hypertension Other    • Stroke Other      Social History     Tobacco Use   • Smoking status: Every Day     Packs/day: 1.50     Years: 30.00     Pack years: 45.00     Types: Cigarettes   • Smokeless tobacco: Never   Vaping Use   • Vaping Use: Never used   Substance Use Topics   • Alcohol use: Yes     Alcohol/week: 6.0 standard drinks     Types: 6 Cans of beer per week     Comment: daily   • Drug use: No       ALLERGIES:  Allergies   Allergen Reactions   • Lisinopril Angioedema         MEDICATIONS:    Current Outpatient Medications:   •  acetaminophen (TYLENOL) 325 MG tablet, Take 2 tablets by mouth Every 4 (Four) Hours As Needed for Mild Pain ., Disp: 60 tablet, Rfl: 0  •  aspirin 81 MG EC tablet, Take 1 tablet by mouth Daily., Disp: 30 tablet, Rfl: 1  •  atorvastatin (LIPITOR) 80 MG tablet, Take 1 tablet by mouth Daily., Disp: 90 tablet, Rfl: 3  •  clopidogrel (PLAVIX) 75 MG tablet, TAKE 1 TABLET BY MOUTH DAILY, Disp: 90 tablet, Rfl: 3  •  dilTIAZem  "CD (CARDIZEM CD) 300 MG 24 hr capsule, Take 1 capsule by mouth Daily., Disp: 30 capsule, Rfl: 2  •  losartan (COZAAR) 25 MG tablet, Take 1 tablet by mouth Daily., Disp: 90 tablet, Rfl: 3  •  sodium bicarbonate 650 MG tablet, Take 1 tablet by mouth 2 (Two) Times a Day., Disp: , Rfl:       Review of Systems   Constitutional: Negative for chills, decreased appetite, fever and weight loss.   HENT: Negative for congestion, nosebleeds and sore throat.    Eyes: Negative for blurred vision, visual disturbance and visual halos.   Cardiovascular: Negative for chest pain, claudication and leg swelling.   Respiratory: Negative for cough, shortness of breath, sputum production and wheezing.    Endocrine: Negative for cold intolerance and polyuria.   Hematologic/Lymphatic: Negative for bleeding problem. Bruises/bleeds easily.   Skin: Positive for unusual hair distribution. Negative for flushing and nail changes.   Musculoskeletal: Positive for arthritis, back pain and joint pain.   Gastrointestinal: Negative for bloating, abdominal pain, hematemesis, melena, nausea and vomiting.   Genitourinary: Negative for flank pain and hematuria.   Neurological: Negative for brief paralysis, difficulty with concentration, focal weakness, light-headedness, loss of balance, numbness, paresthesias and weakness.   Psychiatric/Behavioral: Negative for altered mental status, depression, substance abuse and suicidal ideas.   Allergic/Immunologic: Negative for hives and persistent infections.         Vitals:    03/15/23 1335   BP: 142/70   BP Location: Left arm   Patient Position: Sitting   Cuff Size: Adult   Pulse: 76   SpO2: 98%   Weight: 70.3 kg (155 lb)   Height: 182.9 cm (72\")     Physical Exam   Constitutional: He is oriented to person, place, and time. He appears well-developed.   HENT:   Head: Normocephalic and atraumatic.   Eyes: Pupils are equal, round, and reactive to light. Conjunctivae are normal.   Cardiovascular: Normal rate and normal " pulses.   No murmur heard.  Pulmonary/Chest: Effort normal and breath sounds normal.   Abdominal: Soft. Normal appearance and bowel sounds are normal.   Musculoskeletal: Normal range of motion. No tenderness.   Neurological: He is alert and oriented to person, place, and time. No cranial nerve deficit.   Skin: Skin is warm and dry. Capillary refill takes less than 2 seconds.   There is no evidence of skin breakdown of the bilateral lower extremities.  BLE pink, no evidence of ischemia.  Feet are absent of dependent rubor.   Psychiatric: Judgment normal.   Nursing note and vitals reviewed.      Assessment & Plan     Independent Review of Studies    1. Carotid stenosis, asymptomatic, bilateral  No new deficits, s/p R TCAR  Duplex with stable velocities post procedure  ASA, Plavix, Statin, ARB    No reperfusion symptoms    Repeat duplex bilateral in 12 months    2. Tobacco use  Smoking cessation assistance options offered including behavioral counseling (Smoking Cessation Classes), Nicotine replacement therapy (patches or gum), pharmacologic therapy (Chantix, Wellbutrin). Understands tobacco increases risk of expanding AAA, MI, CVA, PAD, carcinoma. Discussion and question answer period 5-7 minutes.    No desire to quit at this time.     3. Hyperlipidemia  Lipid-lowering therapy has been proven beneficial in patients with cardio-vascular disease. Current guidelines recommend statin treatment for all patients with PAD,CAD and carotid stenosis. Statins are beneficial in preventing cardiovascular events, increasing functional capacity and lower the risk of adverse limb loss in PAD. Statins decrease the progression of plaque formation and may improve peripheral vessel lining, and aid in reversing atherosclerosis.    Lab Results   Component Value Date    CHOL 136 01/23/2023    CHLPL 149 09/13/2016    TRIG 34 01/23/2023    HDL 84 (H) 01/23/2023    LDL 43 01/23/2023         4. Peripheral vascular disease (HCC)  Mild reduction  in bilateral lower extremity perfusion    No claudication, remains on aspirin, Plavix, statin.    Follow-up with interval imaging repeat [facility] and graft ultrasound in 6 months    - Doppler Ankle Brachial Index Single Level CAR; Future  - Duplex Lower Extremity Art / Grafts - Left CAR; Future                This document has been electronically signed by SCOUT Fuentes on March 16, 2023 14:27 CDT

## 2023-04-06 ENCOUNTER — LAB (OUTPATIENT)
Dept: LAB | Facility: HOSPITAL | Age: 73
End: 2023-04-06
Payer: MEDICARE

## 2023-04-06 ENCOUNTER — TRANSCRIBE ORDERS (OUTPATIENT)
Dept: LAB | Facility: HOSPITAL | Age: 73
End: 2023-04-06
Payer: COMMERCIAL

## 2023-04-06 DIAGNOSIS — N18.31 CHRONIC KIDNEY DISEASE (CKD) STAGE G3A/A1, MODERATELY DECREASED GLOMERULAR FILTRATION RATE (GFR) BETWEEN 45-59 ML/MIN/1.73 SQUARE METER AND ALBUMINURIA CREATININE RATIO LESS THAN 30 MG/G (CMS/H*: ICD-10-CM

## 2023-04-06 DIAGNOSIS — D63.1 ANEMIA OF CHRONIC RENAL FAILURE, UNSPECIFIED CKD STAGE: Primary | ICD-10-CM

## 2023-04-06 DIAGNOSIS — D63.1 ANEMIA OF CHRONIC RENAL FAILURE, UNSPECIFIED CKD STAGE: ICD-10-CM

## 2023-04-06 DIAGNOSIS — I12.9 PARENCHYMAL RENAL HYPERTENSION, STAGE 1 THROUGH STAGE 4 OR UNSPECIFIED CHRONIC KIDNEY DISEASE: ICD-10-CM

## 2023-04-06 DIAGNOSIS — E87.20 ACIDOSIS: ICD-10-CM

## 2023-04-06 DIAGNOSIS — N18.9 ANEMIA OF CHRONIC RENAL FAILURE, UNSPECIFIED CKD STAGE: Primary | ICD-10-CM

## 2023-04-06 DIAGNOSIS — N18.9 ANEMIA OF CHRONIC RENAL FAILURE, UNSPECIFIED CKD STAGE: ICD-10-CM

## 2023-04-06 DIAGNOSIS — I73.9 PERIPHERAL VASCULAR DISEASE, UNSPECIFIED: ICD-10-CM

## 2023-04-06 PROCEDURE — 36415 COLL VENOUS BLD VENIPUNCTURE: CPT

## 2023-04-06 PROCEDURE — 84156 ASSAY OF PROTEIN URINE: CPT

## 2023-04-06 PROCEDURE — 83970 ASSAY OF PARATHORMONE: CPT

## 2023-04-06 PROCEDURE — 84550 ASSAY OF BLOOD/URIC ACID: CPT

## 2023-04-06 PROCEDURE — 85014 HEMATOCRIT: CPT

## 2023-04-06 PROCEDURE — 82306 VITAMIN D 25 HYDROXY: CPT

## 2023-04-06 PROCEDURE — 82570 ASSAY OF URINE CREATININE: CPT

## 2023-04-06 PROCEDURE — 80069 RENAL FUNCTION PANEL: CPT

## 2023-04-06 PROCEDURE — 85018 HEMOGLOBIN: CPT

## 2023-04-07 LAB
25(OH)D3 SERPL-MCNC: 35.7 NG/ML (ref 30–100)
ALBUMIN SERPL-MCNC: 4.4 G/DL (ref 3.5–5.2)
ANION GAP SERPL CALCULATED.3IONS-SCNC: 9.1 MMOL/L (ref 5–15)
BUN SERPL-MCNC: 9 MG/DL (ref 8–23)
BUN/CREAT SERPL: 6.9 (ref 7–25)
CALCIUM SPEC-SCNC: 9.7 MG/DL (ref 8.6–10.5)
CHLORIDE SERPL-SCNC: 99 MMOL/L (ref 98–107)
CO2 SERPL-SCNC: 24.9 MMOL/L (ref 22–29)
CREAT SERPL-MCNC: 1.3 MG/DL (ref 0.76–1.27)
CREAT UR-MCNC: 76.5 MG/DL
EGFRCR SERPLBLD CKD-EPI 2021: 59.1 ML/MIN/1.73
GLUCOSE SERPL-MCNC: 84 MG/DL (ref 65–99)
HCT VFR BLD AUTO: 39.3 % (ref 37.5–51)
HGB BLD-MCNC: 13.1 G/DL (ref 13–17.7)
PHOSPHATE SERPL-MCNC: 3.9 MG/DL (ref 2.5–4.5)
POTASSIUM SERPL-SCNC: 4.3 MMOL/L (ref 3.5–5.2)
PROT ?TM UR-MCNC: 18.9 MG/DL
PROT/CREAT UR: 247.1 MG/G CREA (ref 0–200)
PTH-INTACT SERPL-MCNC: 29.4 PG/ML (ref 15–65)
SODIUM SERPL-SCNC: 133 MMOL/L (ref 136–145)
URATE SERPL-MCNC: 6.5 MG/DL (ref 3.4–7)

## 2023-06-09 DIAGNOSIS — I73.9 PERIPHERAL VASCULAR DISEASE: ICD-10-CM

## 2023-06-12 RX ORDER — ATORVASTATIN CALCIUM 80 MG/1
80 TABLET, FILM COATED ORAL DAILY
Qty: 90 TABLET | Refills: 3 | Status: SHIPPED | OUTPATIENT
Start: 2023-06-12

## 2023-06-16 DIAGNOSIS — I10 ESSENTIAL HYPERTENSION: ICD-10-CM

## 2023-07-27 ENCOUNTER — LAB (OUTPATIENT)
Dept: LAB | Facility: HOSPITAL | Age: 73
End: 2023-07-27
Payer: COMMERCIAL

## 2023-07-27 ENCOUNTER — OFFICE VISIT (OUTPATIENT)
Dept: FAMILY MEDICINE CLINIC | Facility: CLINIC | Age: 73
End: 2023-07-27
Payer: COMMERCIAL

## 2023-07-27 VITALS
WEIGHT: 148.5 LBS | DIASTOLIC BLOOD PRESSURE: 72 MMHG | TEMPERATURE: 97.1 F | HEIGHT: 72 IN | SYSTOLIC BLOOD PRESSURE: 128 MMHG | BODY MASS INDEX: 20.11 KG/M2 | OXYGEN SATURATION: 95 % | HEART RATE: 68 BPM

## 2023-07-27 DIAGNOSIS — I10 ESSENTIAL HYPERTENSION: ICD-10-CM

## 2023-07-27 DIAGNOSIS — E78.2 MIXED HYPERLIPIDEMIA: ICD-10-CM

## 2023-07-27 DIAGNOSIS — E78.2 MIXED HYPERLIPIDEMIA: Primary | ICD-10-CM

## 2023-07-27 LAB
ALBUMIN SERPL-MCNC: 4 G/DL (ref 3.5–5.2)
ALBUMIN/GLOB SERPL: 1.1 G/DL
ALP SERPL-CCNC: 90 U/L (ref 39–117)
ALT SERPL W P-5'-P-CCNC: 11 U/L (ref 1–41)
ANION GAP SERPL CALCULATED.3IONS-SCNC: 10 MMOL/L (ref 5–15)
AST SERPL-CCNC: 17 U/L (ref 1–40)
BASOPHILS # BLD AUTO: 0.04 10*3/MM3 (ref 0–0.2)
BASOPHILS NFR BLD AUTO: 0.9 % (ref 0–1.5)
BILIRUB SERPL-MCNC: 0.4 MG/DL (ref 0–1.2)
BUN SERPL-MCNC: 16 MG/DL (ref 8–23)
BUN/CREAT SERPL: 8 (ref 7–25)
CALCIUM SPEC-SCNC: 9.8 MG/DL (ref 8.6–10.5)
CHLORIDE SERPL-SCNC: 102 MMOL/L (ref 98–107)
CHOLEST SERPL-MCNC: 133 MG/DL (ref 0–200)
CO2 SERPL-SCNC: 23 MMOL/L (ref 22–29)
CREAT SERPL-MCNC: 2 MG/DL (ref 0.76–1.27)
DEPRECATED RDW RBC AUTO: 39.7 FL (ref 37–54)
EGFRCR SERPLBLD CKD-EPI 2021: 35.2 ML/MIN/1.73
EOSINOPHIL # BLD AUTO: 0.06 10*3/MM3 (ref 0–0.4)
EOSINOPHIL NFR BLD AUTO: 1.3 % (ref 0.3–6.2)
ERYTHROCYTE [DISTWIDTH] IN BLOOD BY AUTOMATED COUNT: 13.3 % (ref 12.3–15.4)
GLOBULIN UR ELPH-MCNC: 3.5 GM/DL
GLUCOSE SERPL-MCNC: 96 MG/DL (ref 65–99)
HCT VFR BLD AUTO: 34.1 % (ref 37.5–51)
HDLC SERPL-MCNC: 72 MG/DL (ref 40–60)
HGB BLD-MCNC: 11.6 G/DL (ref 13–17.7)
IMM GRANULOCYTES # BLD AUTO: 0.01 10*3/MM3 (ref 0–0.05)
IMM GRANULOCYTES NFR BLD AUTO: 0.2 % (ref 0–0.5)
LDLC SERPL CALC-MCNC: 46 MG/DL (ref 0–100)
LDLC/HDLC SERPL: 0.64 {RATIO}
LYMPHOCYTES # BLD AUTO: 1.05 10*3/MM3 (ref 0.7–3.1)
LYMPHOCYTES NFR BLD AUTO: 22.7 % (ref 19.6–45.3)
MCH RBC QN AUTO: 28.4 PG (ref 26.6–33)
MCHC RBC AUTO-ENTMCNC: 34 G/DL (ref 31.5–35.7)
MCV RBC AUTO: 83.6 FL (ref 79–97)
MONOCYTES # BLD AUTO: 0.65 10*3/MM3 (ref 0.1–0.9)
MONOCYTES NFR BLD AUTO: 14.1 % (ref 5–12)
NEUTROPHILS NFR BLD AUTO: 2.81 10*3/MM3 (ref 1.7–7)
NEUTROPHILS NFR BLD AUTO: 60.8 % (ref 42.7–76)
NRBC BLD AUTO-RTO: 0 /100 WBC (ref 0–0.2)
PLATELET # BLD AUTO: 273 10*3/MM3 (ref 140–450)
PMV BLD AUTO: 11.9 FL (ref 6–12)
POTASSIUM SERPL-SCNC: 4.6 MMOL/L (ref 3.5–5.2)
PROT SERPL-MCNC: 7.5 G/DL (ref 6–8.5)
RBC # BLD AUTO: 4.08 10*6/MM3 (ref 4.14–5.8)
SODIUM SERPL-SCNC: 135 MMOL/L (ref 136–145)
TRIGL SERPL-MCNC: 73 MG/DL (ref 0–150)
TSH SERPL DL<=0.05 MIU/L-ACNC: 1.1 UIU/ML (ref 0.27–4.2)
VLDLC SERPL-MCNC: 15 MG/DL (ref 5–40)
WBC NRBC COR # BLD: 4.62 10*3/MM3 (ref 3.4–10.8)

## 2023-07-27 PROCEDURE — 80061 LIPID PANEL: CPT

## 2023-07-27 PROCEDURE — 84443 ASSAY THYROID STIM HORMONE: CPT

## 2023-07-27 PROCEDURE — 85025 COMPLETE CBC W/AUTO DIFF WBC: CPT

## 2023-07-27 PROCEDURE — 80053 COMPREHEN METABOLIC PANEL: CPT

## 2023-07-27 PROCEDURE — 36415 COLL VENOUS BLD VENIPUNCTURE: CPT

## 2023-07-27 NOTE — PROGRESS NOTES
Family Medicine Residency  Sparkle Miranda MD    Subjective:     Dwight Ryder is a 72 y.o. male who presents for 6 month follow up for hypertension and annual physical. His blood pressure has been well controlled at home with average systolic readings of 120s-130s. He has a CMH of CKD3a and sees nephrology routinely. They too have been monitoring his blood pressure and have changed his Losartan dosage recently. He does not recall the dosage change. He is due to receive annual lab work today including an updated lipid panel for his hyperlipidemia. Otherwise he has no complaints and feels well today.    The following portions of the patient's history were reviewed and updated as appropriate: allergies, current medications, past family history, past medical history, past social history, past surgical history, and problem list.    Past Medical Hx:  Past Medical History:   Diagnosis Date    Artificial lens present     right    Astigmatism     myopic    Atherosclerosis of native arteries of extremity with intermittent claudication     Benign essential hypertension     Benign prostatic hyperplasia     Cataract     R>L    Corneal foreign body     Left eye, 2 years ago       Essential hypertension     Examination     individual health    Exanthematous disorder     Fracture of foot     Hypercholesterolemia     Hyperkalemia     Hyperlipidemia     Hypertensive disorder     Male erectile disorder     Need for vaccination     Peripheral vascular disease     Post RIGHT fem-pop bypass graft 1/7/13 Post thrombolysis to graft 12/15/14       Peripheral vascular disease     unspecified    Posterior subcapsular polar senile cataract     Surgical follow-up care     R fem->pop bypass 1/7/13       Tobacco dependence syndrome     Tobacco user     Urticaria     will give kenalog and benadyl , will order allery test       Visual discomfort        Past Surgical Hx:  Past Surgical History:   Procedure Laterality Date     ANGIOPLASTY      femoral-popliteal artery (dilation) (Abdominal aortogram, bilateral lower extremity runoff. Repair of left common femoral artery.)   11/26/2012     EYE SURGERY      FEMORAL POPLITEAL BYPASS      Right with 6 mm [place]-baron graft)   01/07/2013     INJECTION OF MEDICATION  03/04/2013    kenalog(3)     OTHER SURGICAL HISTORY      Remove cataract, insert lens (Cataract extraction with intraocular lens implantation, right eye.)   11/26/2013     VASCULAR SURGERY         Current Meds:    Current Outpatient Medications:     acetaminophen (TYLENOL) 325 MG tablet, Take 2 tablets by mouth Every 4 (Four) Hours As Needed for Mild Pain ., Disp: 60 tablet, Rfl: 0    aspirin 81 MG EC tablet, Take 1 tablet by mouth Daily., Disp: 30 tablet, Rfl: 1    atorvastatin (LIPITOR) 80 MG tablet, TAKE 1 TABLET BY MOUTH DAILY, Disp: 90 tablet, Rfl: 3    clopidogrel (PLAVIX) 75 MG tablet, TAKE 1 TABLET BY MOUTH DAILY, Disp: 90 tablet, Rfl: 3    dilTIAZem CD (CARDIZEM CD) 300 MG 24 hr capsule, Take 1 capsule by mouth Daily., Disp: 30 capsule, Rfl: 2    losartan (COZAAR) 25 MG tablet, Take 1 tablet by mouth Daily., Disp: 90 tablet, Rfl: 3    sodium bicarbonate 650 MG tablet, Take 1 tablet by mouth 2 (Two) Times a Day., Disp: , Rfl:     Allergies:  Allergies   Allergen Reactions    Lisinopril Angioedema       Family Hx:  Family History   Problem Relation Age of Onset    Cancer Other     Heart disease Other     Hypertension Other     Stroke Other         Social History:  Social History     Socioeconomic History    Marital status: Single   Tobacco Use    Smoking status: Every Day     Packs/day: 1.50     Years: 30.00     Pack years: 45.00     Types: Cigarettes    Smokeless tobacco: Never   Vaping Use    Vaping Use: Never used   Substance and Sexual Activity    Alcohol use: Yes     Alcohol/week: 6.0 standard drinks     Types: 6 Cans of beer per week     Comment: daily    Drug use: No    Sexual activity: Defer       Review of  "Systems  Review of Systems   Constitutional:  Negative for activity change, appetite change, chills, fatigue and fever.   HENT:  Negative for congestion and rhinorrhea.    Eyes:  Negative for visual disturbance.   Respiratory:  Negative for cough, shortness of breath and wheezing.    Cardiovascular:  Negative for chest pain, palpitations and leg swelling.   Gastrointestinal:  Negative for abdominal pain, constipation, diarrhea, nausea and vomiting.   Genitourinary:  Negative for dysuria and hematuria.   Musculoskeletal:  Negative for arthralgias, back pain, myalgias and neck stiffness.   Skin: Negative.    Neurological:  Negative for dizziness, weakness, light-headedness, numbness and headaches.     Objective:     /72   Pulse 68   Temp 97.1 °F (36.2 °C)   Ht 182.9 cm (72\")   Wt 67.4 kg (148 lb 8 oz)   SpO2 95%   BMI 20.14 kg/m²   Physical Exam  Constitutional:       General: He is not in acute distress.     Appearance: Normal appearance.   HENT:      Head: Normocephalic.   Eyes:      Extraocular Movements: Extraocular movements intact.      Conjunctiva/sclera: Conjunctivae normal.      Pupils: Pupils are equal, round, and reactive to light.   Cardiovascular:      Rate and Rhythm: Normal rate and regular rhythm.      Pulses: Normal pulses.      Heart sounds: Normal heart sounds.   Pulmonary:      Effort: Pulmonary effort is normal.      Breath sounds: Normal breath sounds. No wheezing or rhonchi.   Abdominal:      General: Abdomen is flat. Bowel sounds are normal.      Palpations: Abdomen is soft.      Tenderness: There is no abdominal tenderness. There is no guarding.   Musculoskeletal:         General: Normal range of motion.      Cervical back: Normal range of motion.      Right lower leg: No edema.      Left lower leg: No edema.   Lymphadenopathy:      Cervical: No cervical adenopathy.   Skin:     General: Skin is warm.      Capillary Refill: Capillary refill takes less than 2 seconds.   Neurological: "      General: No focal deficit present.      Mental Status: He is alert and oriented to person, place, and time.   Psychiatric:         Mood and Affect: Mood normal.         Behavior: Behavior normal.         Thought Content: Thought content normal.         Judgment: Judgment normal.        Assessment/Plan:     Diagnoses and all orders for this visit:    1. Mixed hyperlipidemia (Primary)  -     TSH; Future  -     CBC w AUTO Differential; Future  -     Comprehensive metabolic panel; Future  -     Lipid panel; Future    2. Essential hypertension  -     TSH; Future  -     CBC w AUTO Differential; Future  -     Comprehensive metabolic panel; Future      Plan:   - continue current BP regimen with losartan, and cardizem 300mg.   - Will obtain annual lab work. Currently on lipitor 80mg for hyperlipidemia, last lipid panel within normal limits.     Rx changes: See A/P  Patient Education: See A/P  Compliance at present is estimated to be satisfactory.   Efforts to improve compliance (if necessary) will be directed at  regular BP monitoring .       Follow-up:     No follow-ups on file.    Preventative:  Health Maintenance   Topic Date Due    Pneumococcal Vaccine 65+ (2 - PCV) 10/18/2014    ZOSTER VACCINE (2 of 2) 11/09/2016    ANNUAL WELLNESS VISIT  08/13/2021    COVID-19 Vaccine (5 - Pfizer series) 07/20/2022    LUNG CANCER SCREENING  01/27/2024 (Originally 10/23/2002)    INFLUENZA VACCINE  10/01/2023    LIPID PANEL  01/23/2024    TDAP/TD VACCINES (2 - Td or Tdap) 09/14/2026    COLORECTAL CANCER SCREENING  09/14/2026    HEPATITIS C SCREENING  Completed    AAA SCREEN (ONE-TIME)  Completed       Alcohol use:  reports current alcohol use of about 6.0 standard drinks per week.  Nicotine status  reports that he has been smoking cigarettes. He has a 45.00 pack-year smoking history. He has never used smokeless tobacco.     Goals         cut back on smoking to 1/2 ppd (pt-stated)       Barrier:  Dependence              RISK SCORE:  2        This document has been electronically signed by Sparkle Miranda MD on July 27, 2023 13:56 CDT

## 2023-07-30 NOTE — PROGRESS NOTES
I have reviewed the notes, assessments, and/or procedures performed by Sparkle Miranda MDduring office visit. I concur with her/his documentation and assessment and plan for Dwight Ryder.           This document has been electronically signed by Liban Mandujano MD on July 30, 2023 11:42 CDT

## 2023-08-07 ENCOUNTER — TRANSCRIBE ORDERS (OUTPATIENT)
Dept: LAB | Facility: HOSPITAL | Age: 73
End: 2023-08-07
Payer: COMMERCIAL

## 2023-08-07 ENCOUNTER — LAB (OUTPATIENT)
Dept: LAB | Facility: HOSPITAL | Age: 73
End: 2023-08-07
Payer: COMMERCIAL

## 2023-08-07 DIAGNOSIS — I12.9 PARENCHYMAL RENAL HYPERTENSION, STAGE 1 THROUGH STAGE 4 OR UNSPECIFIED CHRONIC KIDNEY DISEASE: ICD-10-CM

## 2023-08-07 DIAGNOSIS — I73.9 PERIPHERAL VASCULAR DISEASE, UNSPECIFIED: ICD-10-CM

## 2023-08-07 DIAGNOSIS — N18.31 CHRONIC KIDNEY DISEASE (CKD) STAGE G3A/A1, MODERATELY DECREASED GLOMERULAR FILTRATION RATE (GFR) BETWEEN 45-59 ML/MIN/1.73 SQUARE METER AND ALBUMINURIA CREATININE RATIO LESS THAN 30 MG/G (CMS/H*: ICD-10-CM

## 2023-08-07 DIAGNOSIS — D63.1 ERYTHROPOIETIN DEFICIENCY ANEMIA: Primary | ICD-10-CM

## 2023-08-07 DIAGNOSIS — D63.1 ERYTHROPOIETIN DEFICIENCY ANEMIA: ICD-10-CM

## 2023-08-07 DIAGNOSIS — E87.20 ACIDOSIS: ICD-10-CM

## 2023-08-07 LAB
25(OH)D3 SERPL-MCNC: 37.1 NG/ML (ref 30–100)
ALBUMIN SERPL-MCNC: 3.8 G/DL (ref 3.5–5.2)
ANION GAP SERPL CALCULATED.3IONS-SCNC: 11.7 MMOL/L (ref 5–15)
BUN SERPL-MCNC: 13 MG/DL (ref 8–23)
BUN/CREAT SERPL: 8 (ref 7–25)
CALCIUM SPEC-SCNC: 9.9 MG/DL (ref 8.6–10.5)
CHLORIDE SERPL-SCNC: 99 MMOL/L (ref 98–107)
CO2 SERPL-SCNC: 23.3 MMOL/L (ref 22–29)
CREAT SERPL-MCNC: 1.63 MG/DL (ref 0.76–1.27)
EGFRCR SERPLBLD CKD-EPI 2021: 45 ML/MIN/1.73
GLUCOSE SERPL-MCNC: 101 MG/DL (ref 65–99)
HCT VFR BLD AUTO: 34.7 % (ref 37.5–51)
HGB BLD-MCNC: 12.4 G/DL (ref 13–17.7)
PHOSPHATE SERPL-MCNC: 3.5 MG/DL (ref 2.5–4.5)
POTASSIUM SERPL-SCNC: 4.7 MMOL/L (ref 3.5–5.2)
PTH-INTACT SERPL-MCNC: 35.3 PG/ML (ref 15–65)
SODIUM SERPL-SCNC: 134 MMOL/L (ref 136–145)
URATE SERPL-MCNC: 6.9 MG/DL (ref 3.4–7)

## 2023-08-07 PROCEDURE — 85018 HEMOGLOBIN: CPT

## 2023-08-07 PROCEDURE — 82306 VITAMIN D 25 HYDROXY: CPT | Performed by: NURSE PRACTITIONER

## 2023-08-07 PROCEDURE — 36415 COLL VENOUS BLD VENIPUNCTURE: CPT | Performed by: NURSE PRACTITIONER

## 2023-08-07 PROCEDURE — 83970 ASSAY OF PARATHORMONE: CPT | Performed by: NURSE PRACTITIONER

## 2023-08-07 PROCEDURE — 80069 RENAL FUNCTION PANEL: CPT | Performed by: NURSE PRACTITIONER

## 2023-08-07 PROCEDURE — 82570 ASSAY OF URINE CREATININE: CPT | Performed by: NURSE PRACTITIONER

## 2023-08-07 PROCEDURE — 84550 ASSAY OF BLOOD/URIC ACID: CPT

## 2023-08-07 PROCEDURE — 84156 ASSAY OF PROTEIN URINE: CPT | Performed by: NURSE PRACTITIONER

## 2023-08-07 PROCEDURE — 85014 HEMATOCRIT: CPT

## 2023-08-08 LAB
CREAT UR-MCNC: 108.4 MG/DL
PROT ?TM UR-MCNC: 9.4 MG/DL
PROT/CREAT UR: 86.7 MG/G CREA (ref 0–200)

## 2023-09-14 RX ORDER — DILTIAZEM HYDROCHLORIDE 300 MG/1
300 CAPSULE, COATED, EXTENDED RELEASE ORAL DAILY
Qty: 30 CAPSULE | Refills: 2 | OUTPATIENT
Start: 2023-09-14

## 2023-09-19 DIAGNOSIS — I10 ESSENTIAL HYPERTENSION: ICD-10-CM

## 2023-09-19 RX ORDER — DILTIAZEM HYDROCHLORIDE 300 MG/1
300 CAPSULE, COATED, EXTENDED RELEASE ORAL DAILY
Qty: 30 CAPSULE | Refills: 2 | Status: SHIPPED | OUTPATIENT
Start: 2023-09-19

## 2023-09-28 ENCOUNTER — OFFICE VISIT (OUTPATIENT)
Dept: CARDIAC SURGERY | Facility: CLINIC | Age: 73
End: 2023-09-28
Payer: COMMERCIAL

## 2023-09-28 VITALS
WEIGHT: 148 LBS | HEART RATE: 64 BPM | OXYGEN SATURATION: 98 % | HEIGHT: 72 IN | SYSTOLIC BLOOD PRESSURE: 136 MMHG | BODY MASS INDEX: 20.05 KG/M2 | DIASTOLIC BLOOD PRESSURE: 73 MMHG

## 2023-09-28 DIAGNOSIS — F17.200 SMOKER: ICD-10-CM

## 2023-09-28 DIAGNOSIS — I65.23 BILATERAL CAROTID ARTERY STENOSIS: ICD-10-CM

## 2023-09-28 DIAGNOSIS — I73.9 PERIPHERAL VASCULAR DISEASE: Primary | ICD-10-CM

## 2023-09-28 DIAGNOSIS — E78.2 MIXED HYPERLIPIDEMIA: ICD-10-CM

## 2023-09-28 PROCEDURE — 99214 OFFICE O/P EST MOD 30 MIN: CPT | Performed by: NURSE PRACTITIONER

## 2023-09-28 NOTE — PATIENT INSTRUCTIONS
The results of your vascular studies of your right leg shows mild disease with good  circulation, in the left leg shows milddisease with good  circulation.      If signs and symptoms of ischemia should occur including but not limited to pale/blue discoloration of limb, increasing pain with ambulation or at rest, or a non-healing wound. Patient is to notify [facility] center for immediate evaluation.    6 months [facility] carotid ultrasound

## 2023-09-28 NOTE — PROGRESS NOTES
CVTS Office Progress Note     Dwight Ryder  1950    Chief Complaint:    Chief Complaint   Patient presents with    Peripheral Vascular Disease       HPI:      PCP:  Sparkle Miranda MD    72 y.o. male with HTN(stable, increased risk stroke, rupture), Hyperlipidemia(stable, increased risk cardiovascular events) and Smoker(uncontrolled, increased risk cardiovascular events) BPH, PVD (stable, increased risk cardiovascular events).  smokes 1 PPD.  Established with [facility] in 2013 with lifestyle limiting claudication, prior R SFA stent occluded.  Underwent bypass which occluded the following year requiring lysis.  Unfortunately he continues to smoke.  No complaints of claudication at today's visit.  No other associated signs, symptoms or modifying factors.  Underwent R TCAR, no new deficits, did well post operatively.  No recent hospitalizations or issues returns today in follow up, following with renal for chronic kidney disease.  Denies claudication.    2012 RIGHT SFA stent  2013 RIGHT femoral to popliteal artery bypass (PTFE)  12/15/2014 RIGHT lower extremity angiogram:  RIGHT fem-pop thrombolysis, EKOS.  12/2020 Restudy: Graft patent  11/22/2016 [facility]:  RIGHT 1.1 triphasic.  LEFT 1.1 triphasic.  Graft 169cm/s, triphasic.  5/22/2017 [facility]:  RIGHT 1.1 triphasic.  LEFT 1.0 triphasic.  Graft 186cm/s, triphasic.  12/06/17   [facility]:  RIGHT 1.12  Triphasic.  LEFT 0.99  Triphasic.  Graft Patent 237 cm/s, triphasic.  06/07/18  [facility]EL:  RIGHT 0.99  Triphasic.  LEFT 0.98  Triphasic.   12/18/18  [facility]: Right 1.04 Fem triphasic Poptriphasic DP triphasic PT triphasic, Left 0.95 Fem triphasic Poptriphasic DP triphasic PT triphasic  12/18/18  U/S Graft Survey: RT PTFE bypass patent mPSV 236 at proximal anastomosis   7/16/19 [facility]: Right 1.07 triphasic.  Left 0.99 triphasic.  7/2020 [facility]: Right 1.0 triphasic.  Left 1.05 triphasic.   7/2020 Right Graft US: Patent, [id number] proximal anastamosis  1/2021 [facility]: Right 0.9 triphasic.   Left 0.94 triphasic.   9/2022 [facility]: Right 0.95 triphasic.  Left 0.99 triphasic  9/2022  RIGHT Art US: Above knee fem-pop graft patent, mPSV 171cm/s distal graft  3/2023 [facility]: Right 0.77 triphasic popliteal mixed distally.  Left 0.9 triphasic  9/2023 [facility]: Right 0.87 triphasic.  Left 0.99 triphasic  9/2023  R Art US: Above knee fem-pop graft patent, mPSV 126cm/s mid graft      7/2020 Carotid Duplex: GELACIO 50-69% mPSV 133c/s Ratio 1.3, LICA 50-69% mPSV 139c/s Ratio 0.8, Antegrade vertebrals  1/2021 Carotid Duplex: GELACIO >70% mPSV 235c/s Ratio 1.9, LICA 50-69% mPSV 154c/s Ratio 1.2, Antegrade vertebrals  1/27/2021:CTA Carotids: GELACIO 80% RSCA 60% LICA 40%  3/5/2021 R TCAR  4/2021 Carotid Duplex: GELACIO 50-69% mPSV 139c/s Ratio 2.1, Antegrade vertebrals  7/2021 Carotid Duplex: GELACIO 0-49% mPSV 119c/s Ratio 1.3, LICA 0-49% mPSV 113c/s Ratio 0.6, Antegrade vertebrals  9/2022 Carotid Duplex: GELACIO 0-49% mPSV 121c/s Ratio 2.5, LICA 0-49% mPSV 85c/s Ratio 1.4, Antegrade vertebrals  3/2023 Carotid Duplex: GELACIO 50-69% mPSV 131c/s mEDV 34c/s Ratio 1.9, LICA 0-49% mPSV 92c/s mEDV 20c/s Ratio 1.0, Antegrade vertebrals      The following portions of the patient's history were reviewed and updated as appropriate: allergies, current medications, past family history, past medical history, past social history, past surgical history and problem list.  Recent images independently reviewed.  Available laboratory values reviewed.    PMH:  Past Medical History:   Diagnosis Date    Artificial lens present     right    Astigmatism     myopic    Atherosclerosis of native arteries of extremity with intermittent claudication     Benign essential hypertension     Benign prostatic hyperplasia     Cataract     R>L    Corneal foreign body     Left eye, 2 years ago       Essential hypertension     Examination     individual health    Exanthematous disorder     Fracture of foot     Hypercholesterolemia     Hyperkalemia     Hyperlipidemia     Hypertensive  disorder     Male erectile disorder     Need for vaccination     Peripheral vascular disease     Post RIGHT fem-pop bypass graft 1/7/13 Post thrombolysis to graft 12/15/14       Peripheral vascular disease     unspecified    Posterior subcapsular polar senile cataract     Surgical follow-up care     R fem->pop bypass 1/7/13       Tobacco dependence syndrome     Tobacco user     Urticaria     will give kenalog and benadyl , will order allery test       Visual discomfort      Past Surgical History:   Procedure Laterality Date    ANGIOPLASTY      femoral-popliteal artery (dilation) (Abdominal aortogram, bilateral lower extremity runoff. Repair of left common femoral artery.)   11/26/2012     EYE SURGERY      FEMORAL POPLITEAL BYPASS      Right with 6 mm [place]-baron graft)   01/07/2013     INJECTION OF MEDICATION  03/04/2013    kenalog(3)     OTHER SURGICAL HISTORY      Remove cataract, insert lens (Cataract extraction with intraocular lens implantation, right eye.)   11/26/2013     VASCULAR SURGERY       Family History   Problem Relation Age of Onset    Cancer Other     Heart disease Other     Hypertension Other     Stroke Other      Social History     Tobacco Use    Smoking status: Every Day     Packs/day: 1.50     Years: 30.00     Pack years: 45.00     Types: Cigarettes    Smokeless tobacco: Never   Vaping Use    Vaping Use: Never used   Substance Use Topics    Alcohol use: Yes     Alcohol/week: 6.0 standard drinks     Types: 6 Cans of beer per week     Comment: daily    Drug use: No       ALLERGIES:  Allergies   Allergen Reactions    Lisinopril Angioedema         MEDICATIONS:    Current Outpatient Medications:     acetaminophen (TYLENOL) 325 MG tablet, Take 2 tablets by mouth Every 4 (Four) Hours As Needed for Mild Pain ., Disp: 60 tablet, Rfl: 0    aspirin 81 MG EC tablet, Take 1 tablet by mouth Daily., Disp: 30 tablet, Rfl: 1    atorvastatin (LIPITOR) 80 MG tablet, TAKE 1 TABLET BY MOUTH DAILY, Disp: 90 tablet, Rfl:  "3    clopidogrel (PLAVIX) 75 MG tablet, TAKE 1 TABLET BY MOUTH DAILY, Disp: 90 tablet, Rfl: 3    dilTIAZem CD (CARDIZEM CD) 300 MG 24 hr capsule, Take 1 capsule by mouth Daily., Disp: 30 capsule, Rfl: 2    losartan (COZAAR) 25 MG tablet, Take 1 tablet by mouth Daily., Disp: 90 tablet, Rfl: 3    sodium bicarbonate 650 MG tablet, Take 1 tablet by mouth 2 (Two) Times a Day., Disp: , Rfl:       Review of Systems   Constitutional: Negative for chills, decreased appetite and fever.   HENT:  Negative for congestion, nosebleeds and sore throat.    Eyes:  Negative for blurred vision, visual disturbance and visual halos.   Cardiovascular:  Negative for chest pain, claudication and leg swelling.   Respiratory:  Negative for cough, shortness of breath, sputum production and wheezing.    Endocrine: Negative for cold intolerance and polyuria.   Hematologic/Lymphatic: Negative for bleeding problem. Bruises/bleeds easily.   Skin:  Positive for unusual hair distribution. Negative for flushing and nail changes.   Musculoskeletal:  Positive for arthritis, back pain and joint pain.   Gastrointestinal:  Negative for bloating, abdominal pain, hematemesis, melena, nausea and vomiting.   Genitourinary:  Negative for flank pain and hematuria.   Neurological:  Negative for brief paralysis, difficulty with concentration, focal weakness, light-headedness, loss of balance, numbness, paresthesias and weakness.   Psychiatric/Behavioral:  Negative for altered mental status, depression, substance abuse and suicidal ideas.    Allergic/Immunologic: Negative for hives and persistent infections.       Vitals:    09/28/23 1345   BP: 136/73   BP Location: Left arm   Pulse: 64   SpO2: 98%   Weight: 67.1 kg (148 lb)   Height: 182.9 cm (72\")     Physical Exam   Constitutional: He is oriented to person, place, and time. He appears well-developed. He does not appear ill.   HENT:   Head: Normocephalic and atraumatic.   Eyes: Pupils are equal, round, and " reactive to light. Conjunctivae are normal.   Cardiovascular: Normal rate and normal pulses.   No murmur heard.  Pulmonary/Chest: Effort normal and breath sounds normal.   Abdominal: Soft. Normal appearance and bowel sounds are normal.   Musculoskeletal: Normal range of motion. No tenderness.   Neurological: He is alert and oriented to person, place, and time. No cranial nerve deficit.   Skin: Skin is warm and dry. Capillary refill takes less than 2 seconds.   There is no evidence of skin breakdown of the bilateral lower extremities.  BLE pink, no evidence of ischemia.  Feet are absent of dependent rubor.   Psychiatric: Judgment normal.   Nursing note and vitals reviewed.    Assessment & Plan     Independent Review of Studies    1. Peripheral vascular disease  Mild reduction in right lower extremity perfusion.  Normal triphasic distal signals right graft remains patent.    Unfortunately continues to smoke    No surgical intervention indicated at this time follow-up with interval imaging repeat [facility] approximately 6 months    DAPT, statin    2. Bilateral carotid artery stenosis  Prior right TCAR repeat carotid ultrasound in 6 months  Asymptomatic    - Duplex Carotid Ultrasound CAR; Future    3. Mixed hyperlipidemia  Treatment of hyperlipidemia prevents the progression of peripheral vascular disease and atherosclerosis in multiple beds.  Lipid-lowering therapy may improve claudication distance.  Statins are also considered mandatory in patients with PAD by virtue of reducing cardiovascular events as shown in the heart protection study.  Target LDL for PAD patients is less than 70 mg/dL.  Consideration of PCSK9 inhibitors and/or other adjuvant therapies and statin intolerant patients is recommended.  Continued follow up with PCP is recommended for patients on lipid lowering therapies.    Lab Results   Component Value Date    CHOL 133 07/27/2023    CHLPL 149 09/13/2016    TRIG 73 07/27/2023    HDL 72 (H) 07/27/2023    LDL  46 07/27/2023     4. Smoker  Smoking cessation assistance options offered including behavioral counseling (Smoking Cessation Classes), Nicotine replacement therapy (patches or gum), pharmacologic therapy (Chantix, Wellbutrin). Understands tobacco increases risk of expanding AAA, MI, CVA, PAD, carcinoma. Discussion and question answer period 5-7 minutes.                    This document has been electronically signed by SCOUT Fuentes on September 28, 2023 14:04 CDT

## (undated) DEVICE — SUT MONOCRYL 4/0 PS2 27IN Y426H ETY426H

## (undated) DEVICE — SYR SLP TP 10ML DISP

## (undated) DEVICE — 3M™ IOBAN™ 2 ANTIMICROBIAL INCISE DRAPE 6651EZ: Brand: IOBAN™ 2

## (undated) DEVICE — KT INTRO MIC TROC 4F 21GA 7CM

## (undated) DEVICE — STERILE POLYISOPRENE POWDER-FREE SURGICAL GLOVES WITH EMOLLIENT COATING: Brand: PROTEXIS

## (undated) DEVICE — PACK,UNIVERSAL,NO GOWNS: Brand: MEDLINE

## (undated) DEVICE — SUT VICRYL 4/0 SUTUPAK 18 J109T

## (undated) DEVICE — GLV RAD REDUC ESP SZ8

## (undated) DEVICE — TOWEL,OR,DSP,ST,BLUE,DLX,4/PK,20PK/CS: Brand: MEDLINE

## (undated) DEVICE — RADIFOCUS GLIDEWIRE: Brand: GLIDEWIRE

## (undated) DEVICE — KT INTRO MINISTICK MAX W/GW PALLADIUM ECHO 4F 21G 7CM

## (undated) DEVICE — ST CVR PROB PULLUP ULTRASND 5X48IN

## (undated) DEVICE — PTA BALLOON DILATATION CATHETER: Brand: STERLING™

## (undated) DEVICE — SUT VICRYL 3-0 SH-1 PO 18IN J772D

## (undated) DEVICE — GW ENROUTE HC .014IN 95CM

## (undated) DEVICE — DRSNG SURESITE WNDW 4X4.5

## (undated) DEVICE — DECANTER BAG 9": Brand: MEDLINE INDUSTRIES, INC.

## (undated) DEVICE — GLV SURG SIGNATURE ESSENTIAL PF LTX SZ6.5

## (undated) DEVICE — DRSNG TELFA PAD NONADH STR 1S 3X8IN

## (undated) DEVICE — SPNG LAP 18X18IN LF STRL PK/5

## (undated) DEVICE — PROXIMATE PLUS MD MULTI-DIRECTIONAL RELEASE SKIN STAPLERS CONTAINS 35 STAINLESS STEEL STAPLES APPROXIMATE CLOSED DIMENSIONS: 6.9MM X 3.9MM WIDE: Brand: PROXIMATE

## (undated) DEVICE — SYS SKIN CLS DERMABOND PRINEO W/22CM MESH TP

## (undated) DEVICE — PRESTO™ INFLATION DEVICE: Brand: PRESTO

## (undated) DEVICE — ANGLED-TIP ARTERIAL SHEATH CONFIGURATION: Brand: ENROUTE TRANSCAROTID NEUROPROTECTION SYSTEM

## (undated) DEVICE — SUT PROLN 5/0 C1 D/A 36IN 8720H